# Patient Record
Sex: FEMALE | Race: WHITE | Employment: UNEMPLOYED | URBAN - METROPOLITAN AREA
[De-identification: names, ages, dates, MRNs, and addresses within clinical notes are randomized per-mention and may not be internally consistent; named-entity substitution may affect disease eponyms.]

---

## 2021-09-30 ENCOUNTER — HOSPITAL ENCOUNTER (EMERGENCY)
Age: 36
Discharge: HOME | End: 2021-09-30
Payer: COMMERCIAL

## 2021-09-30 VITALS
OXYGEN SATURATION: 98 % | HEIGHT: 65 IN | RESPIRATION RATE: 15 BRPM | TEMPERATURE: 98 F | DIASTOLIC BLOOD PRESSURE: 70 MMHG | WEIGHT: 118 LBS | BODY MASS INDEX: 19.66 KG/M2 | SYSTOLIC BLOOD PRESSURE: 116 MMHG | HEART RATE: 91 BPM

## 2021-09-30 DIAGNOSIS — B34.9 VIRAL SYNDROME: ICD-10-CM

## 2021-09-30 DIAGNOSIS — Z20.822 SUSPECTED COVID-19 VIRUS INFECTION: Primary | ICD-10-CM

## 2021-09-30 LAB
A/G RATIO: 1.1 (ref 1.1–2.2)
ALBUMIN SERPL-MCNC: 3.9 G/DL (ref 3.4–5)
ALP BLD-CCNC: 75 U/L (ref 40–129)
ALT SERPL-CCNC: 16 U/L (ref 10–40)
ANION GAP SERPL CALCULATED.3IONS-SCNC: 13 MMOL/L (ref 3–16)
AST SERPL-CCNC: 20 U/L (ref 15–37)
BACTERIA: ABNORMAL /HPF
BASOPHILS ABSOLUTE: 0 K/UL (ref 0–0.2)
BASOPHILS RELATIVE PERCENT: 0.6 %
BILIRUB SERPL-MCNC: 2 MG/DL (ref 0–1)
BILIRUBIN URINE: NEGATIVE
BLOOD, URINE: NEGATIVE
BUN BLDV-MCNC: 13 MG/DL (ref 7–20)
CALCIUM SERPL-MCNC: 10.5 MG/DL (ref 8.3–10.6)
CHLORIDE BLD-SCNC: 106 MMOL/L (ref 99–110)
CLARITY: ABNORMAL
CO2: 21 MMOL/L (ref 21–32)
COLOR: YELLOW
CREAT SERPL-MCNC: <0.5 MG/DL (ref 0.6–1.1)
EOSINOPHILS ABSOLUTE: 0 K/UL (ref 0–0.6)
EOSINOPHILS RELATIVE PERCENT: 0.5 %
EPITHELIAL CELLS, UA: 7 /HPF (ref 0–5)
GFR AFRICAN AMERICAN: >60
GFR NON-AFRICAN AMERICAN: >60
GLOBULIN: 3.7 G/DL
GLUCOSE BLD-MCNC: 110 MG/DL (ref 70–99)
GLUCOSE URINE: NEGATIVE MG/DL
HCG(URINE) PREGNANCY TEST: NEGATIVE
HCT VFR BLD CALC: 42.9 % (ref 36–48)
HEMOGLOBIN: 14.7 G/DL (ref 12–16)
HYALINE CASTS: 6 /LPF (ref 0–8)
KETONES, URINE: 40 MG/DL
LEUKOCYTE ESTERASE, URINE: ABNORMAL
LIPASE: 30 U/L (ref 13–60)
LYMPHOCYTES ABSOLUTE: 1.7 K/UL (ref 1–5.1)
LYMPHOCYTES RELATIVE PERCENT: 22.8 %
MCH RBC QN AUTO: 27.7 PG (ref 26–34)
MCHC RBC AUTO-ENTMCNC: 34.4 G/DL (ref 31–36)
MCV RBC AUTO: 80.7 FL (ref 80–100)
MICROSCOPIC EXAMINATION: YES
MONOCYTES ABSOLUTE: 0.7 K/UL (ref 0–1.3)
MONOCYTES RELATIVE PERCENT: 9.7 %
NEUTROPHILS ABSOLUTE: 5.1 K/UL (ref 1.7–7.7)
NEUTROPHILS RELATIVE PERCENT: 66.4 %
NITRITE, URINE: NEGATIVE
PDW BLD-RTO: 12.7 % (ref 12.4–15.4)
PH UA: 8 (ref 5–8)
PLATELET # BLD: 309 K/UL (ref 135–450)
PMV BLD AUTO: 8.8 FL (ref 5–10.5)
POTASSIUM REFLEX MAGNESIUM: 3.6 MMOL/L (ref 3.5–5.1)
PROTEIN UA: NEGATIVE MG/DL
RBC # BLD: 5.32 M/UL (ref 4–5.2)
RBC UA: 3 /HPF (ref 0–4)
SODIUM BLD-SCNC: 140 MMOL/L (ref 136–145)
SPECIFIC GRAVITY UA: 1.02 (ref 1–1.03)
TOTAL PROTEIN: 7.6 G/DL (ref 6.4–8.2)
URINE REFLEX TO CULTURE: ABNORMAL
URINE TYPE: ABNORMAL
UROBILINOGEN, URINE: 1 E.U./DL
WBC # BLD: 7.6 K/UL (ref 4–11)
WBC UA: 4 /HPF (ref 0–5)

## 2021-09-30 PROCEDURE — 99285 EMERGENCY DEPT VISIT HI MDM: CPT

## 2021-09-30 PROCEDURE — 85025 COMPLETE CBC W/AUTO DIFF WBC: CPT

## 2021-09-30 PROCEDURE — 80053 COMPREHEN METABOLIC PANEL: CPT

## 2021-09-30 PROCEDURE — 36415 COLL VENOUS BLD VENIPUNCTURE: CPT

## 2021-09-30 PROCEDURE — 96374 THER/PROPH/DIAG INJ IV PUSH: CPT

## 2021-09-30 PROCEDURE — 6370000000 HC RX 637 (ALT 250 FOR IP): Performed by: GENERAL ACUTE CARE HOSPITAL

## 2021-09-30 PROCEDURE — 81001 URINALYSIS AUTO W/SCOPE: CPT

## 2021-09-30 PROCEDURE — 83690 ASSAY OF LIPASE: CPT

## 2021-09-30 PROCEDURE — 84703 CHORIONIC GONADOTROPIN ASSAY: CPT

## 2021-09-30 PROCEDURE — 96375 TX/PRO/DX INJ NEW DRUG ADDON: CPT

## 2021-09-30 PROCEDURE — 2580000003 HC RX 258: Performed by: GENERAL ACUTE CARE HOSPITAL

## 2021-09-30 PROCEDURE — U0003 INFECTIOUS AGENT DETECTION BY NUCLEIC ACID (DNA OR RNA); SEVERE ACUTE RESPIRATORY SYNDROME CORONAVIRUS 2 (SARS-COV-2) (CORONAVIRUS DISEASE [COVID-19]), AMPLIFIED PROBE TECHNIQUE, MAKING USE OF HIGH THROUGHPUT TECHNOLOGIES AS DESCRIBED BY CMS-2020-01-R: HCPCS

## 2021-09-30 PROCEDURE — U0005 INFEC AGEN DETEC AMPLI PROBE: HCPCS

## 2021-09-30 PROCEDURE — 6360000002 HC RX W HCPCS: Performed by: GENERAL ACUTE CARE HOSPITAL

## 2021-09-30 RX ORDER — METOCLOPRAMIDE HYDROCHLORIDE 5 MG/ML
10 INJECTION INTRAMUSCULAR; INTRAVENOUS ONCE
Status: COMPLETED | OUTPATIENT
Start: 2021-09-30 | End: 2021-09-30

## 2021-09-30 RX ORDER — 0.9 % SODIUM CHLORIDE 0.9 %
1000 INTRAVENOUS SOLUTION INTRAVENOUS ONCE
Status: COMPLETED | OUTPATIENT
Start: 2021-09-30 | End: 2021-09-30

## 2021-09-30 RX ORDER — 0.9 % SODIUM CHLORIDE 0.9 %
2000 INTRAVENOUS SOLUTION INTRAVENOUS ONCE
Status: COMPLETED | OUTPATIENT
Start: 2021-09-30 | End: 2021-09-30

## 2021-09-30 RX ORDER — KETOROLAC TROMETHAMINE 30 MG/ML
30 INJECTION, SOLUTION INTRAMUSCULAR; INTRAVENOUS ONCE
Status: COMPLETED | OUTPATIENT
Start: 2021-09-30 | End: 2021-09-30

## 2021-09-30 RX ORDER — DIPHENHYDRAMINE HYDROCHLORIDE 50 MG/ML
25 INJECTION INTRAMUSCULAR; INTRAVENOUS ONCE
Status: COMPLETED | OUTPATIENT
Start: 2021-09-30 | End: 2021-09-30

## 2021-09-30 RX ORDER — PROMETHAZINE HYDROCHLORIDE 25 MG/1
25 TABLET ORAL EVERY 6 HOURS PRN
Qty: 20 TABLET | Refills: 0 | Status: SHIPPED | OUTPATIENT
Start: 2021-09-30 | End: 2021-10-07

## 2021-09-30 RX ORDER — ACETAMINOPHEN 500 MG
1000 TABLET ORAL ONCE
Status: COMPLETED | OUTPATIENT
Start: 2021-09-30 | End: 2021-09-30

## 2021-09-30 RX ADMIN — SODIUM CHLORIDE 2000 ML: 9 INJECTION, SOLUTION INTRAVENOUS at 13:09

## 2021-09-30 RX ADMIN — SODIUM CHLORIDE 1000 ML: 9 INJECTION, SOLUTION INTRAVENOUS at 16:19

## 2021-09-30 RX ADMIN — KETOROLAC TROMETHAMINE 30 MG: 30 INJECTION, SOLUTION INTRAMUSCULAR at 13:12

## 2021-09-30 RX ADMIN — DIPHENHYDRAMINE HYDROCHLORIDE 25 MG: 50 INJECTION, SOLUTION INTRAMUSCULAR; INTRAVENOUS at 13:12

## 2021-09-30 RX ADMIN — ACETAMINOPHEN 1000 MG: 500 TABLET ORAL at 16:16

## 2021-09-30 RX ADMIN — METOCLOPRAMIDE 10 MG: 5 INJECTION, SOLUTION INTRAMUSCULAR; INTRAVENOUS at 13:12

## 2021-09-30 ASSESSMENT — PAIN DESCRIPTION - LOCATION: LOCATION: HEAD;GENERALIZED

## 2021-09-30 ASSESSMENT — PAIN DESCRIPTION - PAIN TYPE: TYPE: ACUTE PAIN

## 2021-09-30 ASSESSMENT — PAIN SCALES - GENERAL
PAINLEVEL_OUTOF10: 6
PAINLEVEL_OUTOF10: 9

## 2021-09-30 NOTE — LETTER
[facility] Emergency Department  [address] [place] [address], [address]             October 14, 2021    Patient: Kalli Ruiz   YOB: 1985   Date of Visit: 9/30/2021       To Whom It May Concern:    Kalli Ruiz was seen and treated in our emergency department on 9/30/2021. She may return to work 10/3/21 without restrictions.       Sincerely,         Tatiana Grover NP/sp

## 2021-09-30 NOTE — CARE COORDINATION
Patient was not scheduled for New patient/ED Follow-Up appointment with [facility] today. Ms. Marine Morel stated she does not have a current PCP. Ms. Marine Morel declined to establish primary care with a [facility] PCP through me today. I had to rouse the patient several times by calling her name because she fell in and out of sleep throughout our in person encounter. Both the Physician Referral line number ([phone]) and the [address] [place] [address] Department phone number ([phone]) will be provided to her at discharge for any further questions/ concerns and to schedule a PCP at a later time if desired.

## 2021-09-30 NOTE — ED PROVIDER NOTES
[address] [facility]        Pt Name: Guera Tellez  MRN: [id number]  [place] 1985  Date of evaluation: 9/30/2021  Provider: CHARLEE Reyes CNP  PCP: No primary care provider on file. Note Started: 4:56 PM EDT       ZAKI. I have evaluated this patient. My supervising physician was available for consultation. CHIEF COMPLAINT       Chief Complaint   Patient presents with    Emesis     Pt to ER from home with HA, chills, body aches, emesis starting this morning. Pt reports exposed to boyfriend's kids who were dx with covid a couple weeks ago. Denies having covid vaccine.  Headache       HISTORY OF PRESENT ILLNESS   (Location, Timing/Onset, Context/Setting, Quality, Duration, Modifying Factors, Severity, Associated Signs and Symptoms)  Note limiting factors. Chief Complaint: Positive Covid exposure, generalized body aches, headache, vomiting    Guera Tellez is a 29 y.o. female who presents to the emergency department today requesting Covid test.  Patient states that she was exposed to Covid approximately 10 days ago. She states that over the past couple of days she has had generalized body aches, headache, and chills. Patient states that she vomited once this morning. There is been no recent travel. She is otherwise healthy without any chronic medical conditions. She has not been vaccinated against Covid. She does report an intermittent nonproductive cough. She denies having any chest pain or trouble breathing. She denies abdominal pain. She denies any urinary symptoms. She denies vaginal discharge or concerns for STDs. She denies concern for pregnancy. Patient has not taken anything for the symptoms. Nursing Notes were all reviewed and agreed with or any disagreements were addressed in the HPI.     REVIEW OF SYSTEMS    (2-9 systems for level 4, 10 or more for level 5)     Review of Systems   Constitutional: Positive for chills, fatigue and fever. HENT: Positive for congestion. Negative for sore throat and voice change. Eyes: Negative for visual disturbance. Respiratory: Positive for cough. Negative for chest tightness, shortness of breath and wheezing. Cardiovascular: Negative for chest pain and palpitations. Gastrointestinal: Positive for nausea and vomiting. Negative for abdominal pain, blood in stool and diarrhea. Endocrine: Negative for polydipsia and polyuria. Genitourinary: Negative for difficulty urinating and dysuria. Musculoskeletal: Positive for myalgias. Negative for back pain, neck pain and neck stiffness. Skin: Negative for rash and wound. Allergic/Immunologic: Negative for immunocompromised state. Neurological: Positive for headaches. Negative for dizziness, weakness and light-headedness. Hematological: Does not bruise/bleed easily. Psychiatric/Behavioral: Negative for suicidal ideas. Positives and Pertinent negatives as per HPI. Except as noted above in the ROS, all other systems were reviewed and negative. PAST MEDICAL HISTORY     Past Medical History:   Diagnosis Date    Ureteral reflux          SURGICAL HISTORY     Past Surgical History:   Procedure Laterality Date    BLADDER SURGERY      for reflux    URETER STENT PLACEMENT           CURRENTMEDICATIONS       Discharge Medication List as of 2021  5:16 PM      CONTINUE these medications which have NOT CHANGED    Details   PARAGARD INTRAUTERINE COPPER IU by Intrauterine route      acetaminophen (TYLENOL) 500 MG tablet Take 500 mg by mouth every 6 hours as needed for Pain (unsure of mg dose, took 2 at 1130)               ALLERGIES     Keflex [cephalexin]    FAMILYHISTORY     History reviewed. No pertinent family history.        SOCIAL HISTORY       Social History     Tobacco Use    Smoking status: Former Smoker     Packs/day: 0.50     Quit date: 2019     Years since quittin.0    Smokeless tobacco: Never Used   Substance Use Topics    Alcohol use: Yes     Comment: social    Drug use: No       SCREENINGS    Murtaugh Coma Scale  Eye Opening: Spontaneous  Best Verbal Response: Oriented  Best Motor Response: Obeys commands  Arias Coma Scale Score: 15        PHYSICAL EXAM    (up to 7 for level 4, 8 or more for level 5)     ED Triage Vitals [09/30/21 1228]   BP Temp Temp Source Pulse Resp SpO2 Height Weight   133/85 98 °F (36.7 °C) Oral 115 16 98 % 5' 5\" (1.651 m) 118 lb (53.5 kg)       Physical Exam  Vitals and nursing note reviewed. Constitutional:       General: She is not in acute distress. Appearance: Normal appearance. She is not ill-appearing, toxic-appearing or diaphoretic. HENT:      Head: Normocephalic and atraumatic. Right Ear: Tympanic membrane, ear canal and external ear normal.      Left Ear: Tympanic membrane, ear canal and external ear normal.      Nose: Nose normal.      Mouth/Throat:      Mouth: Mucous membranes are dry. Pharynx: Oropharynx is clear. No oropharyngeal exudate or posterior oropharyngeal erythema. Eyes:      General:         Right eye: No discharge. Left eye: No discharge. Extraocular Movements: Extraocular movements intact. Pupils: Pupils are equal, round, and reactive to light. Cardiovascular:      Rate and Rhythm: Normal rate and regular rhythm. Pulses: Normal pulses. Heart sounds: Normal heart sounds. Pulmonary:      Effort: Pulmonary effort is normal. No respiratory distress. Breath sounds: Normal breath sounds. Abdominal:      General: Bowel sounds are normal.      Palpations: Abdomen is soft. Tenderness: There is no abdominal tenderness. There is no right CVA tenderness, left CVA tenderness, guarding or rebound. Musculoskeletal:         General: Normal range of motion. Cervical back: Normal range of motion and neck supple. No rigidity or tenderness. Skin:     General: Skin is warm and dry. Capillary Refill: Capillary refill takes less than 2 seconds. Neurological:      General: No focal deficit present. Mental Status: She is alert and oriented to person, place, and time. Psychiatric:         Mood and Affect: Mood normal.         Behavior: Behavior normal.         Thought Content:  Thought content normal.         Judgment: Judgment normal.         DIAGNOSTIC RESULTS   LABS:    Labs Reviewed   URINE RT REFLEX TO CULTURE - Abnormal; Notable for the following components:       Result Value    Clarity, UA CLOUDY (*)     Ketones, Urine 40 (*)     Leukocyte Esterase, Urine TRACE (*)     All other components within normal limits    Narrative:     Performed at:  [facility] [address] [employer]   Phone [phone]   CBC WITH AUTO DIFFERENTIAL - Abnormal; Notable for the following components:    RBC 5.32 (*)     All other components within normal limits    Narrative:     Performed at:  [facility] [address] [employer]   Phone [phone]   COMPREHENSIVE METABOLIC PANEL W/ REFLEX TO MG FOR LOW K - Abnormal; Notable for the following components:    Glucose 110 (*)     CREATININE <0.5 (*)     Total Bilirubin 2.0 (*)     All other components within normal limits    Narrative:     Performed at:  [facility] [address] [facility] [employer]   Phone [phone]   MICROSCOPIC URINALYSIS - Abnormal; Notable for the following components:    Bacteria, UA 1+ (*)     Epithelial Cells, UA 7 (*)     All other components within normal limits    Narrative:     Performed at:  [facility] [address] [place]   Phone [phone]   PREGNANCY, URINE    Narrative:     Performed at:  [facility] [address] [facility] [employer] [place]   Phone [phone]   LIPASE    Narrative:     Performed at:  [facility]  [address], [address]   Phone [phone]    Narrative:     Performed at:  [facility]  [address]   Phone [phone]       When ordered only abnormal lab results are displayed. All other labs were within normal range or not returned as of this dictation. EKG: When ordered, EKG's are interpreted by the Emergency Department Physician in the absence of a cardiologist.  Please see their note for interpretation of EKG. RADIOLOGY:   Non-plain film images such as CT, Ultrasound and MRI are read by the radiologist. Plain radiographic images are visualized and preliminarily interpreted by the ED Provider with the below findings:        Interpretation per the Radiologist below, if available at the time of this note:    No orders to display     No results found. PROCEDURES   Unless otherwise noted below, none     Procedures    CRITICAL CARE TIME   N/A    CONSULTS:  None      EMERGENCY DEPARTMENT COURSE and DIFFERENTIAL DIAGNOSIS/MDM:   Vitals:    Vitals:    09/30/21 1615 09/30/21 1630 09/30/21 1645 09/30/21 1704   BP: 117/73 112/64 116/70    Pulse:   112 91   Resp:       Temp:       TempSrc:       SpO2: 97% 98%     Weight:       Height:           Patient was given the following medications:  Medications   0.9 % sodium chloride bolus (0 mLs IntraVENous Stopped 9/30/21 1611)   diphenhydrAMINE (BENADRYL) injection 25 mg (25 mg IntraVENous Given 9/30/21 1312)   metoclopramide (REGLAN) injection 10 mg (10 mg IntraVENous Given 9/30/21 1312)   ketorolac (TORADOL) injection 30 mg (30 mg IntraVENous Given 9/30/21 1312)   acetaminophen (TYLENOL) tablet 1,000 mg (1,000 mg Oral Given 9/30/21 1616)   0.9 % sodium chloride bolus (0 mLs IntraVENous Stopped 9/30/21 1710)         Previous records reviewed in order to gain further information regarding patient's PMH as well as her HPI. Nursing notes reviewed. This is a 43-year-old otherwise healthy female who presents to the emergency department today reporting URI symptoms, generalized body aches for the past 2 days. She does report possible Covid exposure. Physical exam complete. She arrives nontoxic, afebrile, normotensive. Patient is medicated with IV normal saline 2 L, IV Benadryl, IV Reglan, IV Toradol, and Tylenol 1 g. Her laboratory studies have been unremarkable. Patient reassessed. She does report significant relief of symptoms after intervention. She is requesting to be discharged home. At this time there is no evidence of any life-threatening or emergent condition requiring immediate intervention. Low suspicion for sepsis, pneumonia, or other acute pathology. She will be discharged with emphasis on close outpatient follow-up. Prescription for Phenergan. She is encouraged to increase her fluid intake. She agrees to follow-up with her PCP as directed. She agrees return to nearest ED for high fever, incessant vomiting, severe pain, any other worsening symptoms. She is discharged home in stable condition. FINAL IMPRESSION      1. Suspected COVID-19 virus infection    2. Viral syndrome          DISPOSITION/PLAN   DISPOSITION Decision To Discharge 09/30/2021 04:56:47 PM      PATIENT REFERRED TO:  [facility]  [phone]  In 2 days  to establish primary care      DISCHARGE MEDICATIONS:  Discharge Medication List as of 9/30/2021  5:16 PM      START taking these medications    Details   promethazine (PHENERGAN) 25 MG tablet Take 1 tablet by mouth every 6 hours as needed for Nausea WARNING:  May cause drowsiness. May impair ability to operate vehicles or machinery.   Do not use in combination with alcohol., Disp-20 tablet, R-0Print             DISCONTINUED MEDICATIONS:  Discharge Medication List as of 9/30/2021  5:16 PM                 (Please note that portions of this note were completed with a voice recognition program.  Efforts were made to edit the dictations but occasionally words are mis-transcribed.)    Loralie Severs, APRN - CNP (electronically signed)            Loralie Severs, APRN - CNP  10/01/21 [id number]

## 2021-09-30 NOTE — ED NOTES
Patient discharged to home. Patient verbalized understanding of discharge instructions, patient alert and oriented x4 at this time. Patient leaving in stable condition.      Josephine Da Silva RN  09/30/21 2642

## 2021-10-01 LAB — SARS-COV-2: NOT DETECTED

## 2021-10-01 ASSESSMENT — ENCOUNTER SYMPTOMS
BLOOD IN STOOL: 0
SORE THROAT: 0
ABDOMINAL PAIN: 0
COUGH: 1
CHEST TIGHTNESS: 0
VOMITING: 1
VOICE CHANGE: 0
NAUSEA: 1
WHEEZING: 0
SHORTNESS OF BREATH: 0
DIARRHEA: 0
BACK PAIN: 0

## 2021-10-06 LAB
HEPATITIS B SURFACE ANTIGEN INTERPRETATION: NORMAL
HEPATITIS C ANTIBODY INTERPRETATION: REACTIVE
HIV AG/AB: NORMAL
HIV ANTIGEN: NORMAL
HIV-1 ANTIBODY: NORMAL
HIV-2 AB: NORMAL
RPR CONFIRMATORY: NORMAL
TOTAL SYPHILLIS IGG/IGM: REACTIVE
TSH SERPL DL<=0.05 MIU/L-ACNC: <0.01 UIU/ML (ref 0.27–4.2)

## 2021-10-10 LAB — TREPONEMA PALLIDUM ANTIBODIES: REACTIVE

## 2021-10-19 ENCOUNTER — HOSPITAL ENCOUNTER (OUTPATIENT)
Age: 36
Discharge: HOME | End: 2021-10-19
Payer: COMMERCIAL

## 2021-10-19 PROCEDURE — 87522 HEPATITIS C REVRS TRNSCRPJ: CPT

## 2021-10-22 LAB
HCV QNT BY NAAT IU/ML: NOT DETECTED IU/ML
HCV QNT BY NAAT LOG IU/ML: NOT DETECTED LOG IU/ML
INTERPRETATION: NOT DETECTED

## 2021-10-27 ENCOUNTER — VIRTUAL VISIT (OUTPATIENT)
Dept: FAMILY MEDICINE CLINIC | Age: 36
End: 2021-10-27
Payer: COMMERCIAL

## 2021-10-27 ENCOUNTER — NURSE TRIAGE (OUTPATIENT)
Dept: OTHER | Facility: CLINIC | Age: 36
End: 2021-10-27

## 2021-10-27 DIAGNOSIS — G44.009 MIGRAINE-CLUSTER HEADACHE SYNDROME: Primary | ICD-10-CM

## 2021-10-27 PROCEDURE — 99204 OFFICE O/P NEW MOD 45 MIN: CPT | Performed by: FAMILY MEDICINE

## 2021-10-27 PROCEDURE — G8420 CALC BMI NORM PARAMETERS: HCPCS | Performed by: FAMILY MEDICINE

## 2021-10-27 PROCEDURE — G8427 DOCREV CUR MEDS BY ELIG CLIN: HCPCS | Performed by: FAMILY MEDICINE

## 2021-10-27 PROCEDURE — G8484 FLU IMMUNIZE NO ADMIN: HCPCS | Performed by: FAMILY MEDICINE

## 2021-10-27 PROCEDURE — 1036F TOBACCO NON-USER: CPT | Performed by: FAMILY MEDICINE

## 2021-10-27 RX ORDER — SUMATRIPTAN 25 MG/1
25 TABLET, FILM COATED ORAL
Qty: 20 TABLET | Refills: 2 | Status: SHIPPED | OUTPATIENT
Start: 2021-10-27 | End: 2022-05-31

## 2021-10-27 RX ORDER — ONDANSETRON 4 MG/1
4 TABLET, ORALLY DISINTEGRATING ORAL EVERY 8 HOURS PRN
Qty: 30 TABLET | Refills: 0 | Status: SHIPPED | OUTPATIENT
Start: 2021-10-27 | End: 2022-01-13

## 2021-10-27 SDOH — ECONOMIC STABILITY: FOOD INSECURITY: WITHIN THE PAST 12 MONTHS, THE FOOD YOU BOUGHT JUST DIDN'T LAST AND YOU DIDN'T HAVE MONEY TO GET MORE.: NEVER TRUE

## 2021-10-27 SDOH — ECONOMIC STABILITY: FOOD INSECURITY: WITHIN THE PAST 12 MONTHS, YOU WORRIED THAT YOUR FOOD WOULD RUN OUT BEFORE YOU GOT MONEY TO BUY MORE.: NEVER TRUE

## 2021-10-27 ASSESSMENT — SOCIAL DETERMINANTS OF HEALTH (SDOH): HOW HARD IS IT FOR YOU TO PAY FOR THE VERY BASICS LIKE FOOD, HOUSING, MEDICAL CARE, AND HEATING?: NOT HARD AT ALL

## 2021-10-27 ASSESSMENT — PATIENT HEALTH QUESTIONNAIRE - PHQ9
2. FEELING DOWN, DEPRESSED OR HOPELESS: 0
SUM OF ALL RESPONSES TO PHQ9 QUESTIONS 1 & 2: 0
SUM OF ALL RESPONSES TO PHQ QUESTIONS 1-9: 0
SUM OF ALL RESPONSES TO PHQ QUESTIONS 1-9: 0
1. LITTLE INTEREST OR PLEASURE IN DOING THINGS: 0
SUM OF ALL RESPONSES TO PHQ QUESTIONS 1-9: 0

## 2021-10-27 ASSESSMENT — ENCOUNTER SYMPTOMS
CONSTIPATION: 0
CHEST TIGHTNESS: 0
ABDOMINAL PAIN: 0
DIARRHEA: 0
SHORTNESS OF BREATH: 0
SORE THROAT: 0
RHINORRHEA: 0

## 2021-10-27 NOTE — TELEPHONE ENCOUNTER
Reason for Disposition   SEVERE headache (e.g., excruciating) and has had severe headaches before    Answer Assessment - Initial Assessment Questions  1. LOCATION: \"Where does it hurt? \"       Behind left eye    2. ONSET: \"When did the headache start? \" (Minutes, hours or days)       This one started 2 days ago - she woke up at 0630 and knew it was a migraine    3. PATTERN: \"Does the pain come and go, or has it been constant since it started? \"      When she has a migraine it is pretty constant    4. SEVERITY: \"How bad is the pain? \" and \"What does it keep you from doing? \"  (e.g., Scale 1-10; mild, moderate, or severe)    - MILD (1-3): doesn't interfere with normal activities     - MODERATE (4-7): interferes with normal activities or awakens from sleep     - SEVERE (8-10): excruciating pain, unable to do any normal activities         Rates 8/10    5. RECURRENT SYMPTOM: \"Have you ever had headaches before? \" If so, ask: \"When was the last time? \" and \"What happened that time? \"       Yes    6. CAUSE: \"What do you think is causing the headache? \"      Migraine    7. MIGRAINE: \"Have you been diagnosed with migraine headaches? \" If so, ask: \"Is this headache similar? \"       Yes, diagnosed with cluster migraines     8. HEAD INJURY: \"Has there been any recent injury to the head? \"       Denies    9. OTHER SYMPTOMS: \"Do you have any other symptoms? \" (fever, stiff neck, eye pain, sore throat, cold symptoms)      Excedrin works only temporarily. Pt c/o nausea, no other symptoms       10. PREGNANCY: \"Is there any chance you are pregnant? \" \"When was your last menstrual period? \"        no    Protocols used: HEADACHE-ADULT-[place]    Patient called [facility] with red flag complaint to establish care with Michael MARX.     Brief description of triage: see above    Triage indicates for patient to callback by pcp within 1 hour - since pt unestablished , per workflow, this RN recommended going to ER or UC for pain control/further evaluation. Care advice provided, patient verbalizes understanding; denies any other questions or concerns; instructed to call back for any new or worsening symptoms. Writer provided warm transfer to [facility] for appointment scheduling to establish care. Attention Provider: Thank you for allowing me to participate in the care of your patient. The patient was connected to triage in response to information provided to the ECC. Please do not respond through this encounter as the response is not directed to a shared pool.

## 2021-10-27 NOTE — PROGRESS NOTES
Subjective:   Patient ID: Blayne Cruz is a 29 y.o. female here today to establish care. HPI by clinical support staff:   Chief Complaint   Patient presents with    New Patient    Migraine     H/O of migraines, current episode has lasted 3 days. Preliminary data above this line collected by clinical support staff.    ______________________________________________________________________  HPI by Provider:   HPI   Patient presents via virtual visit to establish care. History reviewed and updated with patient today. She reports she has been having throbbing headache over the last 3 days. States that she has a history of migraines and cluster headaches that had initially resolved however started having episodes last week. Has been more stressed than usual.  Was previously on Imitrex which did help her symptoms. Patient states that she is very nauseated at this time and unable to talk further. Denies any neurological symptoms. Data above this line collected by Provider. Patient's medications, allergies, past medical, surgical, social and family histories were reviewed and updated as appropriate. Patient Care Team:  Lurdes Obando MD as PCP - General (Family Medicine)  Allergies   Allergen Reactions    Keflex [Cephalexin] Rash     No current outpatient medications on file prior to visit. No current facility-administered medications on file prior to visit. Review of Systems   Constitutional: Negative for activity change, appetite change, fatigue and fever. HENT: Negative for congestion, rhinorrhea and sore throat. Respiratory: Negative for chest tightness and shortness of breath. Cardiovascular: Negative for chest pain, palpitations and leg swelling. Gastrointestinal: Negative for abdominal pain, constipation and diarrhea. Genitourinary: Negative for dysuria and frequency. Musculoskeletal: Negative for arthralgias.    Neurological: Negative for dizziness, weakness and headaches. Psychiatric/Behavioral: Negative for hallucinations. All other systems reviewed and are negative. ROS above this line reviewed by Provider. Objective: There were no vitals taken for this visit. Physical Exam  Nursing note reviewed. Constitutional:       General: She is not in acute distress. Appearance: Normal appearance. She is normal weight. She is not ill-appearing, toxic-appearing or diaphoretic. Comments: Well groomed   HENT:      Head: Normocephalic and atraumatic. Pulmonary:      Effort: Pulmonary effort is normal.      Comments: Speaking in full sentences  Musculoskeletal:      Cervical back: Normal range of motion. Neurological:      Mental Status: She is alert. Psychiatric:         Mood and Affect: Mood normal.         Behavior: Behavior normal.         Thought Content: Thought content normal.       Lab Results   Component Value Date    WBC 7.6 09/30/2021    HGB 14.7 09/30/2021    HCT 42.9 09/30/2021    MCV 80.7 09/30/2021     09/30/2021     Lab Results   Component Value Date     09/30/2021    K 3.6 09/30/2021    BUN 13 09/30/2021    CREATININE <0.5 09/30/2021    GLUCOSE 110 09/30/2021    CALCIUM 10.5 09/30/2021    BILITOT 2.0 09/30/2021    ALKPHOS 75 09/30/2021    AST 20 09/30/2021    ALT 16 09/30/2021    GFRAA >60 09/30/2021     Lab Results   Component Value Date    TSH <0.01 10/05/2021     No results found for: LABA1C  No results found for: EAG  No results found for: CHOL, TRIG, HDL, LDLCHOLESTEROL, CHOLHDLRATIO  Lab Results   Component Value Date    LABMICR YES 09/30/2021     No results found for: VITD25  Assessment and Plan:   1. Migraine-cluster headache syndrome  Known history of migraines and cluster headaches prescription sent in at this time as patient is not able to talk further given her nausea.   Patient advised to visit the emergency room if symptoms do not improve or if she develops any neurological symptoms patient verbalized understanding plans to follow-up in office in 1 week. - ondansetron (ZOFRAN-ODT) 4 MG disintegrating tablet; Take 1 tablet by mouth every 8 hours as needed for Nausea or Vomiting  Dispense: 30 tablet; Refill: 0  - SUMAtriptan (IMITREX) 25 MG tablet; Take 1 tablet by mouth once as needed for Migraine May repeat in 2 hours if not improving- max daily 200mg. Dispense: 20 tablet; Refill: 2       Tequila Chi  is a 29 y.o. female being evaluated by a Virtual Visit (video visit) encounter to address concerns as mentioned above. A caregiver was present when appropriate. Due to this being a TeleHealth encounter (During [address] emergency), evaluation of the following organ systems was limited: Vitals/Constitutional/EENT/Resp/CV/GI//MS/Neuro/Skin/Heme-Lymph-Imm. Pursuant to the emergency declaration under the [address] [place] [address] and the [employer] Act, this Virtual Visit was conducted with patient's (and/or legal guardian's) consent, to reduce the patient's risk of exposure to COVID-19 and provide necessary medical care. The patient (and/or legal guardian) has also been advised to contact this office for worsening conditions or problems, and seek emergency medical treatment and/or call 911 if deemed necessary. Patient identification was verified at the start of the visit: Yes    Total time spent for this encounter: Not billed by time    Services were provided through a video synchronous discussion virtually to substitute for in-person clinic visit. Patient  located at their individual homes. This chart note was prepared using a voice recognition dictation program. This note was reviewed for accuracy; however, addition, deletion and sound-alike word errors may occur. If there are any questions regarding this chart note, please contact the originating provider.     Electronically signed by Carisa Pace MD  10/27/2021   1:09 PM    Return in about 1 week (around 11/3/2021) for Headache.

## 2021-11-16 ENCOUNTER — HOSPITAL ENCOUNTER (OUTPATIENT)
Dept: CT IMAGING | Age: 36
Discharge: HOME | End: 2021-11-16
Payer: COMMERCIAL

## 2021-11-16 DIAGNOSIS — R63.4 ABNORMAL WEIGHT LOSS: ICD-10-CM

## 2021-11-16 PROCEDURE — 6360000004 HC RX CONTRAST MEDICATION: Performed by: INTERNAL MEDICINE

## 2021-11-16 PROCEDURE — 74177 CT ABD & PELVIS W/CONTRAST: CPT

## 2021-11-16 RX ADMIN — IOHEXOL 50 ML: 240 INJECTION, SOLUTION INTRATHECAL; INTRAVASCULAR; INTRAVENOUS; ORAL at 07:45

## 2021-11-16 RX ADMIN — IOPAMIDOL 75 ML: 755 INJECTION, SOLUTION INTRAVENOUS at 07:45

## 2022-01-05 ENCOUNTER — HOSPITAL ENCOUNTER (OUTPATIENT)
Age: 37
Discharge: HOME | End: 2022-01-05
Payer: COMMERCIAL

## 2022-01-05 LAB
T3 FREE: 10.8 PG/ML (ref 2.3–4.2)
T4 FREE: 2.8 NG/DL (ref 0.9–1.8)
TSH SERPL DL<=0.05 MIU/L-ACNC: <0.01 UIU/ML (ref 0.27–4.2)

## 2022-01-05 PROCEDURE — 84481 FREE ASSAY (FT-3): CPT

## 2022-01-05 PROCEDURE — 84439 ASSAY OF FREE THYROXINE: CPT

## 2022-01-05 PROCEDURE — 84443 ASSAY THYROID STIM HORMONE: CPT

## 2022-01-05 PROCEDURE — 36415 COLL VENOUS BLD VENIPUNCTURE: CPT

## 2022-01-07 ENCOUNTER — OFFICE VISIT (OUTPATIENT)
Dept: FAMILY MEDICINE CLINIC | Age: 37
End: 2022-01-07
Payer: COMMERCIAL

## 2022-01-07 VITALS
BODY MASS INDEX: 19.66 KG/M2 | SYSTOLIC BLOOD PRESSURE: 115 MMHG | WEIGHT: 118 LBS | TEMPERATURE: 98.2 F | HEART RATE: 87 BPM | HEIGHT: 65 IN | DIASTOLIC BLOOD PRESSURE: 75 MMHG | OXYGEN SATURATION: 97 %

## 2022-01-07 DIAGNOSIS — F43.22 ADJUSTMENT DISORDER WITH ANXIOUS MOOD: Primary | ICD-10-CM

## 2022-01-07 DIAGNOSIS — R63.4 WEIGHT LOSS: ICD-10-CM

## 2022-01-07 DIAGNOSIS — Z86.19 HISTORY OF HEPATITIS C: ICD-10-CM

## 2022-01-07 DIAGNOSIS — R13.10 DYSPHAGIA, UNSPECIFIED TYPE: ICD-10-CM

## 2022-01-07 PROCEDURE — G8427 DOCREV CUR MEDS BY ELIG CLIN: HCPCS | Performed by: NURSE PRACTITIONER

## 2022-01-07 PROCEDURE — G8420 CALC BMI NORM PARAMETERS: HCPCS | Performed by: NURSE PRACTITIONER

## 2022-01-07 PROCEDURE — 1036F TOBACCO NON-USER: CPT | Performed by: NURSE PRACTITIONER

## 2022-01-07 PROCEDURE — 99214 OFFICE O/P EST MOD 30 MIN: CPT | Performed by: NURSE PRACTITIONER

## 2022-01-07 PROCEDURE — G8484 FLU IMMUNIZE NO ADMIN: HCPCS | Performed by: NURSE PRACTITIONER

## 2022-01-07 RX ORDER — PANTOPRAZOLE SODIUM 40 MG/1
TABLET, DELAYED RELEASE ORAL
COMMUNITY
Start: 2022-01-03 | End: 2022-05-25

## 2022-01-07 RX ORDER — HYDROXYZINE PAMOATE 25 MG/1
25 CAPSULE ORAL 3 TIMES DAILY PRN
Qty: 90 CAPSULE | Refills: 0 | Status: SHIPPED | OUTPATIENT
Start: 2022-01-07 | End: 2022-02-01 | Stop reason: SINTOL

## 2022-01-07 RX ORDER — ESCITALOPRAM OXALATE 10 MG/1
TABLET ORAL
Qty: 30 TABLET | Refills: 3 | Status: SHIPPED | OUTPATIENT
Start: 2022-01-07 | End: 2022-03-17

## 2022-01-07 NOTE — PROGRESS NOTES
Giulia Wong (:  1985) is a 29 y.o. female,Established patient, here for evaluation of the following chief complaint(s):  Weight Loss and Anxiety         ASSESSMENT/PLAN:  1. Adjustment disorder with anxious mood  Stable;  Begin Lexapro and Vistaril. Risks and benefits discussed. Genesight completed. -     escitalopram (LEXAPRO) 10 MG tablet; Take 1/2 tablet daily x 1 week and then increase to 1 full tablet daily, Disp-30 tablet, R-3Normal  -     hydrOXYzine (VISTARIL) 25 MG capsule; Take 1 capsule by mouth 3 times daily as needed for Anxiety, Disp-90 capsule, R-0Normal  2. Weight loss  Stable;  Continue w/u from GI and allergist.  3. Dysphagia, unspecified type  Stable;  Continue recommendations from GI.  4. History of hepatitis C  Stable;  Treatment complete. Return in about 2 weeks (around 2022).          Subjective   SUBJECTIVE/OBJECTIVE:  HPI   Weight loss  3-4 months ago  Got sick- not COVID; lasted for a while  Started losing weight- does not have a scale  Diet has not changed much  Moderate exercise  In 3 months lost 25 lbs  Saw gynecology  Is gaining/ maintaining- got down to 110 lbs    Adjustment disorder with anxious mood  Grieves kids- does not have full custody, are not with her all the time; 5 years ago sarted moving them with their dad, 1 year ago got full custody  Is not working right now- is ok  Has been feeling \"body anxiety\"; will get short of breath or hold breath; feet will tense  Is on edge all the time  Feels like she is going to cry  Has up and downs sometimes- depends on what is going on  Does not go days without sleeping  Has been in a stable place  Wellbutrin- makes her eat and sweat  Prozac, Seroquel and Buspar- did not feel better  Has never taken anything for a long period of time  Celexa or Zoloft as a kid  Denies SI    Dysphagia  Thought had eosinophilic esophagitis- saw Estrellita Turcios says does not have  Wants to look thyroid  Dr. Darren Enriquez C  Is cured- completed treatment    Review of Systems       Objective   Physical Exam  Vitals reviewed. Constitutional:       Appearance: Normal appearance. HENT:      Head: Normocephalic. Right Ear: External ear normal.      Left Ear: External ear normal.   Cardiovascular:      Rate and Rhythm: Normal rate and regular rhythm. Pulses: Normal pulses. Heart sounds: Normal heart sounds, S1 normal and S2 normal.   Pulmonary:      Effort: Pulmonary effort is normal.      Breath sounds: Normal breath sounds and air entry. Musculoskeletal:      Right lower leg: No edema. Left lower leg: No edema. Neurological:      Mental Status: She is alert. Psychiatric:         Mood and Affect: Mood is anxious. Affect is tearful. Thought Content: Thought content does not include suicidal ideation. Thought content does not include suicidal plan. An electronic signature was used to authenticate this note.     --CHARLEE Gaviria - CNP

## 2022-01-08 PROBLEM — R63.4 WEIGHT LOSS: Status: ACTIVE | Noted: 2022-01-08

## 2022-01-08 PROBLEM — R13.10 DYSPHAGIA: Status: ACTIVE | Noted: 2022-01-08

## 2022-01-08 PROBLEM — F43.22 ADJUSTMENT DISORDER WITH ANXIOUS MOOD: Status: ACTIVE | Noted: 2022-01-08

## 2022-01-08 PROBLEM — Z86.19 HISTORY OF HEPATITIS C: Status: ACTIVE | Noted: 2022-01-08

## 2022-01-13 ENCOUNTER — OFFICE VISIT (OUTPATIENT)
Dept: FAMILY MEDICINE CLINIC | Age: 37
End: 2022-01-13
Payer: COMMERCIAL

## 2022-01-13 ENCOUNTER — HOSPITAL ENCOUNTER (OUTPATIENT)
Age: 37
Discharge: HOME | End: 2022-01-13
Payer: COMMERCIAL

## 2022-01-13 VITALS
HEART RATE: 85 BPM | HEIGHT: 66 IN | SYSTOLIC BLOOD PRESSURE: 110 MMHG | TEMPERATURE: 97.2 F | OXYGEN SATURATION: 98 % | DIASTOLIC BLOOD PRESSURE: 58 MMHG | BODY MASS INDEX: 18.42 KG/M2 | WEIGHT: 114.6 LBS | RESPIRATION RATE: 16 BRPM

## 2022-01-13 DIAGNOSIS — R79.89 LOW TSH LEVEL: ICD-10-CM

## 2022-01-13 DIAGNOSIS — R79.89 LOW TSH LEVEL: Primary | ICD-10-CM

## 2022-01-13 LAB — THYROID PEROXIDASE (TPO) ABS: 314 IU/ML

## 2022-01-13 PROCEDURE — G8484 FLU IMMUNIZE NO ADMIN: HCPCS | Performed by: NURSE PRACTITIONER

## 2022-01-13 PROCEDURE — 84445 ASSAY OF TSI GLOBULIN: CPT

## 2022-01-13 PROCEDURE — 83520 IMMUNOASSAY QUANT NOS NONAB: CPT

## 2022-01-13 PROCEDURE — 86376 MICROSOMAL ANTIBODY EACH: CPT

## 2022-01-13 PROCEDURE — G8420 CALC BMI NORM PARAMETERS: HCPCS | Performed by: NURSE PRACTITIONER

## 2022-01-13 PROCEDURE — 99214 OFFICE O/P EST MOD 30 MIN: CPT | Performed by: NURSE PRACTITIONER

## 2022-01-13 PROCEDURE — G8427 DOCREV CUR MEDS BY ELIG CLIN: HCPCS | Performed by: NURSE PRACTITIONER

## 2022-01-13 PROCEDURE — 36415 COLL VENOUS BLD VENIPUNCTURE: CPT

## 2022-01-13 PROCEDURE — 4004F PT TOBACCO SCREEN RCVD TLK: CPT | Performed by: NURSE PRACTITIONER

## 2022-01-13 NOTE — PROGRESS NOTES
Nini Noel (:  1985) is a 29 y.o. female,Established patient, here for evaluation of the following chief complaint(s):  Established New Doctor         ASSESSMENT/PLAN:  1. Low TSH level  Stable; Not progressing; Low TSH on lab work. Ordered further testing and thyroid uptake scan. Referral to endocrinology.  -     NM THYROID UPTAKE AND SCAN; Future  -     THYROID PEROXIDASE ANTIBODY; Future  -     THYROTROPIN RECEPTOR ANTIBODY; Future  -     THYROID STIMULATING IMMUNOGLOBULIN; Future  -     Jenifer Friend MD, Endocrinology, [facility]      No follow-ups on file. Subjective   SUBJECTIVE/OBJECTIVE:  HPI  Did not start Lexapro- anxiety  Weight loss  No diarrhea  Sometimes heat intolerance; hands clammy  Gets sweaty  No thinning of hair  No vision changes  + tachycardia  Low TSH- allergist completed  Will do further testing    Review of Systems       Objective   Physical Exam  Vitals reviewed. Constitutional:       Appearance: Normal appearance. HENT:      Head: Normocephalic. Right Ear: External ear normal.      Left Ear: External ear normal.   Pulmonary:      Effort: No respiratory distress. Neurological:      Mental Status: She is alert. Psychiatric:         Mood and Affect: Mood normal.       An electronic signature was used to authenticate this note.     --CHARLEE Martinez - CNP

## 2022-01-18 LAB — THYROID STIMULATING IMMUNOGLOBULIN: 31 IU/L

## 2022-01-20 LAB — TSH RECEPTOR AB: 25.29 IU/L

## 2022-01-27 ENCOUNTER — HOSPITAL ENCOUNTER (OUTPATIENT)
Dept: NUCLEAR MEDICINE | Age: 37
Discharge: HOME | End: 2022-01-27
Payer: COMMERCIAL

## 2022-01-27 DIAGNOSIS — R79.89 LOW TSH LEVEL: ICD-10-CM

## 2022-01-27 PROCEDURE — A9516 IODINE I-123 SOD IODIDE MIC: HCPCS | Performed by: NURSE PRACTITIONER

## 2022-01-27 PROCEDURE — 3430000000 HC RX DIAGNOSTIC RADIOPHARMACEUTICAL: Performed by: NURSE PRACTITIONER

## 2022-01-27 PROCEDURE — 78014 THYROID IMAGING W/BLOOD FLOW: CPT

## 2022-01-27 RX ORDER — SODIUM IODIDE I 123 100 UCI/1
315 CAPSULE, GELATIN COATED ORAL ONCE
Status: COMPLETED | OUTPATIENT
Start: 2022-01-27 | End: 2022-01-27

## 2022-01-27 RX ADMIN — SODIUM IODIDE I 123 315 MICRO CURIE: 100 CAPSULE, GELATIN COATED ORAL at 07:47

## 2022-01-28 ENCOUNTER — HOSPITAL ENCOUNTER (OUTPATIENT)
Dept: NUCLEAR MEDICINE | Age: 37
Discharge: HOME | End: 2022-01-28

## 2022-02-01 ENCOUNTER — OFFICE VISIT (OUTPATIENT)
Dept: FAMILY MEDICINE CLINIC | Age: 37
End: 2022-02-01
Payer: COMMERCIAL

## 2022-02-01 VITALS
OXYGEN SATURATION: 98 % | BODY MASS INDEX: 20.71 KG/M2 | SYSTOLIC BLOOD PRESSURE: 110 MMHG | RESPIRATION RATE: 16 BRPM | DIASTOLIC BLOOD PRESSURE: 70 MMHG | HEART RATE: 71 BPM | WEIGHT: 126.4 LBS | TEMPERATURE: 96.8 F

## 2022-02-01 DIAGNOSIS — R07.81 RIB PAIN: ICD-10-CM

## 2022-02-01 DIAGNOSIS — R79.89 LOW TSH LEVEL: Primary | ICD-10-CM

## 2022-02-01 DIAGNOSIS — F43.22 ADJUSTMENT DISORDER WITH ANXIOUS MOOD: ICD-10-CM

## 2022-02-01 PROCEDURE — G8484 FLU IMMUNIZE NO ADMIN: HCPCS | Performed by: NURSE PRACTITIONER

## 2022-02-01 PROCEDURE — G8427 DOCREV CUR MEDS BY ELIG CLIN: HCPCS | Performed by: NURSE PRACTITIONER

## 2022-02-01 PROCEDURE — G8420 CALC BMI NORM PARAMETERS: HCPCS | Performed by: NURSE PRACTITIONER

## 2022-02-01 PROCEDURE — 4004F PT TOBACCO SCREEN RCVD TLK: CPT | Performed by: NURSE PRACTITIONER

## 2022-02-01 PROCEDURE — 99214 OFFICE O/P EST MOD 30 MIN: CPT | Performed by: NURSE PRACTITIONER

## 2022-02-01 RX ORDER — PROPRANOLOL HYDROCHLORIDE 10 MG/1
10 TABLET ORAL 2 TIMES DAILY
Qty: 60 TABLET | Refills: 0 | Status: SHIPPED | OUTPATIENT
Start: 2022-02-01 | End: 2022-02-28

## 2022-02-01 ASSESSMENT — PATIENT HEALTH QUESTIONNAIRE - PHQ9
7. TROUBLE CONCENTRATING ON THINGS, SUCH AS READING THE NEWSPAPER OR WATCHING TELEVISION: 0
4. FEELING TIRED OR HAVING LITTLE ENERGY: 0
10. IF YOU CHECKED OFF ANY PROBLEMS, HOW DIFFICULT HAVE THESE PROBLEMS MADE IT FOR YOU TO DO YOUR WORK, TAKE CARE OF THINGS AT HOME, OR GET ALONG WITH OTHER PEOPLE: 0
2. FEELING DOWN, DEPRESSED OR HOPELESS: 0
6. FEELING BAD ABOUT YOURSELF - OR THAT YOU ARE A FAILURE OR HAVE LET YOURSELF OR YOUR FAMILY DOWN: 0
SUM OF ALL RESPONSES TO PHQ QUESTIONS 1-9: 4
8. MOVING OR SPEAKING SO SLOWLY THAT OTHER PEOPLE COULD HAVE NOTICED. OR THE OPPOSITE, BEING SO FIGETY OR RESTLESS THAT YOU HAVE BEEN MOVING AROUND A LOT MORE THAN USUAL: 0
SUM OF ALL RESPONSES TO PHQ9 QUESTIONS 1 & 2: 0
9. THOUGHTS THAT YOU WOULD BE BETTER OFF DEAD, OR OF HURTING YOURSELF: 0
SUM OF ALL RESPONSES TO PHQ QUESTIONS 1-9: 4
SUM OF ALL RESPONSES TO PHQ QUESTIONS 1-9: 4
1. LITTLE INTEREST OR PLEASURE IN DOING THINGS: 0
3. TROUBLE FALLING OR STAYING ASLEEP: 3
SUM OF ALL RESPONSES TO PHQ QUESTIONS 1-9: 4
5. POOR APPETITE OR OVEREATING: 1

## 2022-02-01 ASSESSMENT — ANXIETY QUESTIONNAIRES
GAD7 TOTAL SCORE: 0
1. FEELING NERVOUS, ANXIOUS, OR ON EDGE: 0
2. NOT BEING ABLE TO STOP OR CONTROL WORRYING: 0
4. TROUBLE RELAXING: 0
7. FEELING AFRAID AS IF SOMETHING AWFUL MIGHT HAPPEN: 0
3. WORRYING TOO MUCH ABOUT DIFFERENT THINGS: 0
5. BEING SO RESTLESS THAT IT IS HARD TO SIT STILL: 0
6. BECOMING EASILY ANNOYED OR IRRITABLE: 0

## 2022-02-01 NOTE — PROGRESS NOTES
Yosef Florentino (:  1985) is a 29 y.o. female,Established patient, here for evaluation of the following chief complaint(s):  Results (lab results )         ASSESSMENT/PLAN:  1. Low TSH level  Stable;  Discussed results. Will recheck in 1 month. Patient has an upcoming appointment with endocrinology. Patient unsure if she is finished having kids.  -     TSH without Reflex; Future  -     T4, FREE; Future  -     T3, FREE; Future  -     THYROID PEROXIDASE ANTIBODY; Future  -     THYROID STIMULATING IMMUNOGLOBULIN; Future  -     ANTI-THYROGLOBULIN ANTIBODY; Future  -     THYROTROPIN RECEPTOR ANTIBODY; Future  2. Adjustment disorder with anxious mood  Stable;  Continue current regimen. Stop vistaril. Begin propranolol. Risks and benefits discussed. 3. Rib pain  Stable;  XR ordered. Encouraged ice PRN. -     XR RIBS RIGHT INCLUDE CHEST (MIN 3 VIEWS); Future      Return in about 4 weeks (around 3/1/2022). Subjective   SUBJECTIVE/OBJECTIVE:  HPI  Normal thyroid uptake scan  Abnormal thyroid labs  Cannot get in with endocrine until May  Has gained weight back    Anxiety  Started Lexapro  Is going in the right direction  Kids have noticed a difference- less irritable  Vistaril- is not helping, even increased to 50 mg  Buspar- low feelings    Was wrestling on the couch  Squeezed and heard a pop on right rib  Can feel and is tender  No cough or shortness of breath  Cannot take a deep breath  Has not tried anything    Used to have paraguard- got removed once had issues  Hx of needing lovenox- dx with MTHFR (had 5 miscarriages)    Is not sure about having more babies    Review of Systems       Objective   Physical Exam  Vitals reviewed. Constitutional:       Appearance: Normal appearance. HENT:      Head: Normocephalic. Right Ear: External ear normal.      Left Ear: External ear normal.   Cardiovascular:      Rate and Rhythm: Normal rate and regular rhythm. Pulses: Normal pulses. Heart sounds: Normal heart sounds, S1 normal and S2 normal.   Pulmonary:      Effort: Pulmonary effort is normal.      Breath sounds: Normal breath sounds and air entry. Chest:      Chest wall: Tenderness present. Musculoskeletal:      Right lower leg: No edema. Left lower leg: No edema. Neurological:      Mental Status: She is alert. Psychiatric:         Mood and Affect: Mood normal.       An electronic signature was used to authenticate this note.     --Aimee Garg, CHARLEE - CNP

## 2022-02-10 ENCOUNTER — NURSE TRIAGE (OUTPATIENT)
Dept: OTHER | Facility: CLINIC | Age: 37
End: 2022-02-10

## 2022-02-10 NOTE — TELEPHONE ENCOUNTER
Received call from Mukesh Osuna at [facility] with Red Flag Complaint. Home test positive for COVID today    Current Symptoms: cough, headache, chills, nausea, nasal congestion, body aches    Speaking in complete sentences    Onset: 6 hours ago     Pain Severity: 5/10; Temperature: 101F by forehead thermometer    What has been tried: Tylenol, theraflu      Recommended disposition: Sofía Mojica 4196 advice provided, patient verbalizes understanding; denies any other questions or concerns; instructed to call back for any new or worsening symptoms. Home Care     Attention Provider: Thank you for allowing me to participate in the care of your patient. The patient was connected to triage in response to information provided to the [facility]/PSC. Please do not respond through this encounter as the response is not directed to a shared pool.       Reason for Disposition   [1] COVID-19 diagnosed by positive lab test (e.g., PCR, rapid self-test kit) AND [2] mild symptoms (e.g., cough, fever, others) AND [1] no complications or SOB    Protocols used: CORONAVIRUS (COVID-19) DIAGNOSED OR SUSPECTED-ADULT-

## 2022-02-28 RX ORDER — PROPRANOLOL HYDROCHLORIDE 10 MG/1
TABLET ORAL
Qty: 60 TABLET | Refills: 5 | Status: SHIPPED | OUTPATIENT
Start: 2022-02-28 | End: 2022-03-17

## 2022-02-28 NOTE — TELEPHONE ENCOUNTER
Medication:   Requested Prescriptions     Pending Prescriptions Disp Refills    propranolol (INDERAL) 10 MG tablet [Pharmacy Med Name: PROPRANOLOL 10MG TABLETS] 60 tablet 0     Sig: TAKE 1 TABLET BY MOUTH TWICE DAILY        Last Filled:      Patient Phone Number: [phone] (home)     Last appt: 2/1/2022   Next appt: Visit date not found    Last OARRS: No flowsheet data found.

## 2022-03-15 ENCOUNTER — TELEPHONE (OUTPATIENT)
Dept: FAMILY MEDICINE CLINIC | Age: 37
End: 2022-03-15

## 2022-03-15 NOTE — TELEPHONE ENCOUNTER
----- Message from [facility] sent at 3/15/2022  2:00 PM EDT -----  Subject: Message to Provider    QUESTIONS  Information for Provider? Pt. called In stated that she feels that   medication is not working. She is taking escitalopram 10mg once a day, and   propranolol 10mg 1 tablet by mouth twice a day. She has no car until after   6pm there no appointment that late she is willing to do VV but no VV   appointments showing she was wondering if PCP could do and appointment   over Phone or VV.  ---------------------------------------------------------------------------  --------------  CALL BACK INFO  What is the best way for the office to contact you? Do not leave any   message, patient will call back for answer  Preferred Call Back Phone Number? [phone]  ---------------------------------------------------------------------------  --------------  SCRIPT ANSWERS  Relationship to Patient?  Self

## 2022-03-17 ENCOUNTER — TELEMEDICINE (OUTPATIENT)
Dept: FAMILY MEDICINE CLINIC | Age: 37
End: 2022-03-17
Payer: COMMERCIAL

## 2022-03-17 DIAGNOSIS — F43.22 ADJUSTMENT DISORDER WITH ANXIOUS MOOD: Primary | ICD-10-CM

## 2022-03-17 PROCEDURE — 99214 OFFICE O/P EST MOD 30 MIN: CPT | Performed by: NURSE PRACTITIONER

## 2022-03-17 PROCEDURE — 4004F PT TOBACCO SCREEN RCVD TLK: CPT | Performed by: NURSE PRACTITIONER

## 2022-03-17 PROCEDURE — G8420 CALC BMI NORM PARAMETERS: HCPCS | Performed by: NURSE PRACTITIONER

## 2022-03-17 PROCEDURE — G8427 DOCREV CUR MEDS BY ELIG CLIN: HCPCS | Performed by: NURSE PRACTITIONER

## 2022-03-17 PROCEDURE — G8484 FLU IMMUNIZE NO ADMIN: HCPCS | Performed by: NURSE PRACTITIONER

## 2022-03-17 RX ORDER — ESCITALOPRAM OXALATE 20 MG/1
20 TABLET ORAL DAILY
Qty: 30 TABLET | Refills: 2 | Status: SHIPPED | OUTPATIENT
Start: 2022-03-17

## 2022-03-17 RX ORDER — LEVOMEFOLATE/ALGAL OIL 15-90.314
1 CAPSULE ORAL DAILY
Qty: 30 CAPSULE | Refills: 2 | Status: SHIPPED | OUTPATIENT
Start: 2022-03-17 | End: 2022-05-25

## 2022-03-17 NOTE — PROGRESS NOTES
Venecia Quiñones (:  1985) is a Established patient, here for evaluation of the following:    Assessment & Plan   Below is the assessment and plan developed based on review of pertinent history, physical exam, labs, studies, and medications. 1. Adjustment disorder with anxious mood  Stable; Not progressing; Increase Lexapro and add Deplin based on genesight results. -     escitalopram (LEXAPRO) 20 MG tablet; Take 1 tablet by mouth daily, Disp-30 tablet, R-2Normal    Return in about 2 weeks (around 3/31/2022). Subjective   HPI   Feels like there is a little something missing  A little jittery anxiety  Has gained weight 130-132- eating is better   switched jobs- has been helpful- eat dinner together  Likes the Lexapro- has seen some change  Propranolol- does not seem like it is helping  Buspar- low feeling, muscles in legs feel restless  Vistaril- is not helping  Wellbutrin- makes her crazy    Review of Systems       Objective   Patient-Reported Vitals  Patient-Reported Weight: 130  Patient-Reported Height: 5'5\"       Physical Exam  Vitals reviewed. Constitutional:       Appearance: Normal appearance. HENT:      Head: Normocephalic. Right Ear: External ear normal.      Left Ear: External ear normal.      Nose: Nose normal.   Pulmonary:      Effort: No respiratory distress. Neurological:      Mental Status: She is alert. Psychiatric:         Mood and Affect: Mood normal.         Venecia Quiñones, was evaluated through a synchronous (real-time) audio-video encounter. The patient (or guardian if applicable) is aware that this is a billable service, which includes applicable co-pays. This Virtual Visit was conducted with patient's (and/or legal guardian's) consent. The visit was conducted pursuant to the emergency declaration under the [facility], [address] [place] [address] authority and the [employer] and [place] [employer] General Act. Patient identification was verified, and a caregiver was present when appropriate. The patient was located at home in a state where the provider was licensed to provide care.        --CHARLEE Hogan - CNP

## 2022-04-08 ENCOUNTER — TELEPHONE (OUTPATIENT)
Dept: FAMILY MEDICINE CLINIC | Age: 37
End: 2022-04-08

## 2022-04-08 NOTE — TELEPHONE ENCOUNTER
----- Message from Caryl Nelson sent at 4/8/2022  8:46 AM EDT -----  Subject: Appointment Request    Reason for Call: Routine Existing Condition Follow Up    QUESTIONS  Type of Appointment? Established Patient  Reason for appointment request? Available appointments did not meet   patient need  Additional Information for Provider? pt calling to get a VV f/u appt for   anxiety and last appt, please call to schedule a VV with the pt   ---------------------------------------------------------------------------  --------------  CALL BACK INFO  What is the best way for the office to contact you? Do not leave any   message, patient will call back for answer, OK to respond with electronic   message via [employer] portal (only for patients who have registered [employer]   account)  Preferred Call Back Phone Number? [phone]  ---------------------------------------------------------------------------  --------------  SCRIPT ANSWERS  Relationship to Patient? Self  Is this follow up request related to routine Diabetes Management? No  Have you been diagnosed with, awaiting test results for, or told that you   are suspected of having COVID-19 (Coronavirus)? (If patient has tested   negative or was tested as a requirement for work, school, or travel and   not based on symptoms, answer no)? No  Within the past 10 days have you developed any of the following symptoms   (answer no if symptoms have been present longer than 10 days or began   more than 10 days ago)? Fever or Chills, Cough, Shortness of breath or   difficulty breathing, Loss of taste or smell, Sore throat, Nasal   congestion, Sneezing or runny nose, Fatigue or generalized body aches   (answer no if pain is specific to a body part e.g. back pain), Diarrhea,   Headache? No  Have you had close contact with someone with COVID-19 in the last 7 days? No  (Service Expert  click yes below to proceed with [facility] [facility] As Usual   Scheduling)?  Yes

## 2022-04-14 ENCOUNTER — TELEPHONE (OUTPATIENT)
Dept: ORTHOPEDIC SURGERY | Age: 37
End: 2022-04-14

## 2022-04-14 ENCOUNTER — OFFICE VISIT (OUTPATIENT)
Dept: ORTHOPEDIC SURGERY | Age: 37
End: 2022-04-14
Payer: COMMERCIAL

## 2022-04-14 VITALS — BODY MASS INDEX: 22.99 KG/M2 | WEIGHT: 138 LBS | HEIGHT: 65 IN

## 2022-04-14 DIAGNOSIS — M25.561 RIGHT KNEE PAIN, UNSPECIFIED CHRONICITY: Primary | ICD-10-CM

## 2022-04-14 PROCEDURE — 99204 OFFICE O/P NEW MOD 45 MIN: CPT | Performed by: ORTHOPAEDIC SURGERY

## 2022-04-14 PROCEDURE — 4004F PT TOBACCO SCREEN RCVD TLK: CPT | Performed by: ORTHOPAEDIC SURGERY

## 2022-04-14 PROCEDURE — G8427 DOCREV CUR MEDS BY ELIG CLIN: HCPCS | Performed by: ORTHOPAEDIC SURGERY

## 2022-04-14 PROCEDURE — G8420 CALC BMI NORM PARAMETERS: HCPCS | Performed by: ORTHOPAEDIC SURGERY

## 2022-04-14 NOTE — TELEPHONE ENCOUNTER
General Question     Subject: PATIENT IS NEEDING XRAYS TO SEE IF THE STINTS   PATIENT IS NEEDING A NEW MRI ORDER.    Patient: Lambert Robles Number: [phone]

## 2022-04-14 NOTE — Clinical Note
Dear Bret Reynoso,    I had the pleasure of evaluating your patient in my office today. Please see the attached note for full details of the visit.     With kind regards,  Vipin Wise MD

## 2022-04-14 NOTE — TELEPHONE ENCOUNTER
General Question     Subject: Patient returned the call and now wants to know where to get the xr done.   Patient:[place], [address] [facility]

## 2022-04-14 NOTE — PROGRESS NOTES
2022     Reason for visit:  Right knee injury on 4/10/2022 following right ACL reconstruction and medial meniscus repair    History of Present Illness: The patient is a 52-year-old female who presents for evaluation. She reports that she injured her self on 4/10/2022. At that time she was moving in her bed when she twisted her knee. She felt a popping sensation followed by pain. Ever since then she reports locking of the knee. She has limited extension and flexion. She reports difficulty bearing weight. She has been using crutches. Of note, the patient did undergo a right knee ACL reconstruction with hamstring autograft and medial meniscus repair in May 2021 by Dr. Carlee Mckee at [facility]. Report that she did well following that surgery up until this recent event.     Medical History:  Past Medical History:   Diagnosis Date    Migraine     Ureteral reflux       Past Surgical History:   Procedure Laterality Date    ANTERIOR CRUCIATE LIGAMENT REPAIR Right     BLADDER SURGERY      for reflux    MENISCECTOMY Right     URETER STENT PLACEMENT        Family History   Problem Relation Age of Onset    Heart Disease Mother     No Known Problems Father     No Known Problems Sister     No Known Problems Brother     No Known Problems Maternal Grandmother     No Known Problems Maternal Grandfather     No Known Problems Paternal Grandmother     No Known Problems Paternal Grandfather     No Known Problems Daughter     Heart Defect Daughter     Heart Defect Daughter       Social History     Socioeconomic History    Marital status: Single     Spouse name: Not on file    Number of children: Not on file    Years of education: Not on file    Highest education level: Not on file   Occupational History    Not on file   Tobacco Use    Smoking status: Former Smoker     Packs/day: 0.50     Types: Cigarettes     Quit date: 2019     Years since quittin.5    Smokeless tobacco: Current User   [employer] Tobacco comment: Vaps   Vaping Use    Vaping Use: Every day    Substances: Nicotine   Substance and Sexual Activity    Alcohol use: Yes     Comment: social    Drug use: No    Sexual activity: Yes     Partners: Male   Other Topics Concern    Not on file   Social History Narrative    Not on file     Social Determinants of Health     Financial Resource Strain: Low Risk     Difficulty of Paying Living Expenses: Not hard at all   Food Insecurity: No Food Insecurity    Worried About [address] [employer] in the Last Year: Never true    [facility] in the Last Year: Never true   Transportation Needs:     Lack of Transportation (Medical): Not on file    Lack of Transportation (Non-Medical):  Not on file   Physical Activity:     Days of Exercise per Week: Not on file    Minutes of Exercise per Session: Not on file   Stress:     Feeling of Stress : Not on file   Social Connections:     Frequency of Communication with Friends and Family: Not on file    Frequency of Social Gatherings with Friends and Family: Not on file    Attends Baptism Services: Not on file    Active Member of [address] [place] [address] or Organizations: Not on file    Attends Club or Organization Meetings: Not on file    Marital Status: Not on file   Intimate Partner Violence:     Fear of Current or Ex-Partner: Not on file    Emotionally Abused: Not on file    Physically Abused: Not on file    Sexually Abused: Not on file   Housing Stability:     Unable to Pay for Housing in the Last Year: Not on file    Number of [place] in the Last Year: Not on file    Unstable Housing in the Last Year: Not on file      Current Outpatient Medications on File Prior to Visit   Medication Sig Dispense Refill    L-Methylfolate-Algae (DEPLIN 15) 15-90.314 MG CAPS Take 1 capsule by mouth daily 30 capsule 2    escitalopram (LEXAPRO) 20 MG tablet Take 1 tablet by mouth daily 30 tablet 2    pantoprazole (PROTONIX) 40 MG tablet TAKE 1 TABLET BY MOUTH EVERY DAY  SUMAtriptan (IMITREX) 25 MG tablet Take 1 tablet by mouth once as needed for Migraine May repeat in 2 hours if not improving- max daily 200mg. 20 tablet 2     No current facility-administered medications on file prior to visit. Allergies   Allergen Reactions    Keflex [Cephalexin] Rash        Review of Systems:  Constitutional: Patient is adequately groomed with no evidence of malnutrition  Mental Status: The patient is oriented to time, place and person. The patient's mood and affect are appropriate. Lymphatic: The lymphatic examination bilaterally reveals all areas to be without enlargement or induration. Vascular: Examination reveals no swelling or calf tenderness. Peripheral pulses are palpable and 2+. Neurological: The patient has good coordination. There is no weakness or sensory deficit. Skin:  Head/Neck: inspection reveals no rashes, ulcerations or lesions. Trunk: inspection reveals no rashes, ulcerations or lesions. Objective:  Ht 5' 5\" (1.651 m)   Wt 138 lb (62.6 kg)   BMI 22.96 kg/m²      Physical Exam:  The patient is well-appearing and in no apparent distress  Examination of the right knee   There is a + effusion, no gross deformity or skin changes  Difficult to assess ligamentous stability given patient's pain and guarding  She does have medial and lateral joint line tenderness  5 out of 5 strength throughout distal muscle groups  Sensation is intact to light touch throughout all distributions  There is no calf swelling or tenderness  Palpable DP pulse, brisk cap refill, 2+ symmetric reflexes    Imagin view x-rays of the right knee were obtained the office today on 2022. There is no fracture or dislocation. Postop changes consistent with ACL reconstruction. Metallic button along the lateral cortex of the distal femur from ACL reconstruction.       Assessment:  Right knee injury sustained on 4/10/2022 following ACL reconstruction with hamstring autograft and medial meniscus repair in May 2021. Concern for displaced meniscus tear    Plan:  I discussed the patient the differential diagnosis. Her exam is concerning today as well as the fact that she has significant locking and difficulty bearing weight. I would recommend an MRI for further evaluation. She is in agreement. Following the study she will return to review the results in the office. Greater than 45 minutes were spent with this encounter. Time spent included evaluating the patient's chart prior to arrival.  Evaluating the patient in the office including history, physical examination, imaging reviewing, and counseling on next steps. Lastly, time was spent discussing orders with my staff as well as providing documentation in the chart. Natalya Figueroa MD            Orthopaedic Surgery Sports Medicine and [address] and [address]            Team Physician [facility]      Disclaimer: This note was dictated with voice recognition software. Though review and correction are routine, we apologize for any errors.

## 2022-04-18 NOTE — TELEPHONE ENCOUNTER
SPOKE WITH PT. LT HER KNOW IT WOULD BE A WALK IN ORDER FOR XR. SHE IS GOING TO GO TO [facility] AND GET THIS COMPLETED. ONCE SHE HAS IT DONE, IF NOT TODAY, SHE IS GOING TO CALL BACK AND LET US KNOW IT HAS BEEN COMPLETED SO I MAY F/U WITH MRI DEPARTMENT TO MAKE SURE THEY HAVE EVERYTHING THEY NEED TO MOVE FORWARD. ALL QUESTIONS ANSWERED. PATIENT EXPRESSED UNDERSTANDING.

## 2022-04-18 NOTE — TELEPHONE ENCOUNTER
General Question      Subject:Patient called wanting updates on where to get xr done .    Patient:[place], [address] [facility]

## 2022-04-19 ENCOUNTER — HOSPITAL ENCOUNTER (OUTPATIENT)
Age: 37
Discharge: HOME | End: 2022-04-19
Payer: COMMERCIAL

## 2022-04-19 ENCOUNTER — HOSPITAL ENCOUNTER (OUTPATIENT)
Dept: GENERAL RADIOLOGY | Age: 37
Discharge: HOME | End: 2022-04-19
Payer: COMMERCIAL

## 2022-04-19 DIAGNOSIS — M25.561 RIGHT KNEE PAIN, UNSPECIFIED CHRONICITY: ICD-10-CM

## 2022-04-19 DIAGNOSIS — R79.89 LOW TSH LEVEL: ICD-10-CM

## 2022-04-19 LAB
ANTI-THYROGLOB ABS: 629 IU/ML
T3 FREE: 3 PG/ML (ref 2.3–4.2)
T4 FREE: 0.8 NG/DL (ref 0.9–1.8)
THYROID PEROXIDASE (TPO) ABS: 235 IU/ML
TSH SERPL DL<=0.05 MIU/L-ACNC: 0.32 UIU/ML (ref 0.27–4.2)

## 2022-04-19 PROCEDURE — 84445 ASSAY OF TSI GLOBULIN: CPT

## 2022-04-19 PROCEDURE — 36415 COLL VENOUS BLD VENIPUNCTURE: CPT

## 2022-04-19 PROCEDURE — 83520 IMMUNOASSAY QUANT NOS NONAB: CPT

## 2022-04-19 PROCEDURE — 84439 ASSAY OF FREE THYROXINE: CPT

## 2022-04-19 PROCEDURE — 74018 RADEX ABDOMEN 1 VIEW: CPT

## 2022-04-19 PROCEDURE — 86800 THYROGLOBULIN ANTIBODY: CPT

## 2022-04-19 PROCEDURE — 84443 ASSAY THYROID STIM HORMONE: CPT

## 2022-04-19 PROCEDURE — 86376 MICROSOMAL ANTIBODY EACH: CPT

## 2022-04-19 PROCEDURE — 84481 FREE ASSAY (FT-3): CPT

## 2022-04-20 ENCOUNTER — TELEPHONE (OUTPATIENT)
Dept: ORTHOPEDIC SURGERY | Age: 37
End: 2022-04-20

## 2022-04-20 NOTE — TELEPHONE ENCOUNTER
I have a call out to MRI department at [facility] to confirm with KUB abdomen XR that MRI is compatible. They will return call with update to Annie's line.

## 2022-04-20 NOTE — TELEPHONE ENCOUNTER
General Question     Subject: PATIENT STATES THAT SHE RECEIVED HER XRS AND NOW IS WISHING FOR AN MRI ORDER FOR R KNEE.  PLEASE ADVISE   Patient: Brennon Chapman  Contact Number: [phone]

## 2022-04-20 NOTE — TELEPHONE ENCOUNTER
Spoke with pt letting her know that an MRI order has already been put in the system.  I gave her the number to call and schedule

## 2022-04-21 LAB
THYROID STIMULATING IMMUNOGLOBULIN: 4.71 IU/L
TSH RECEPTOR AB: 5.23 IU/L

## 2022-05-02 ENCOUNTER — HOSPITAL ENCOUNTER (OUTPATIENT)
Dept: MRI IMAGING | Age: 37
Discharge: HOME | End: 2022-05-02
Payer: COMMERCIAL

## 2022-05-02 DIAGNOSIS — M25.561 RIGHT KNEE PAIN, UNSPECIFIED CHRONICITY: ICD-10-CM

## 2022-05-02 PROCEDURE — 73721 MRI JNT OF LWR EXTRE W/O DYE: CPT

## 2022-05-03 ENCOUNTER — OFFICE VISIT (OUTPATIENT)
Dept: ORTHOPEDIC SURGERY | Age: 37
End: 2022-05-03
Payer: COMMERCIAL

## 2022-05-03 VITALS — HEIGHT: 65 IN | WEIGHT: 140 LBS | BODY MASS INDEX: 23.32 KG/M2

## 2022-05-03 DIAGNOSIS — M25.561 RIGHT KNEE PAIN, UNSPECIFIED CHRONICITY: Primary | ICD-10-CM

## 2022-05-03 DIAGNOSIS — S83.211A BUCKET-HANDLE TEAR OF MEDIAL MENISCUS OF RIGHT KNEE AS CURRENT INJURY, INITIAL ENCOUNTER: ICD-10-CM

## 2022-05-03 PROCEDURE — G8427 DOCREV CUR MEDS BY ELIG CLIN: HCPCS | Performed by: ORTHOPAEDIC SURGERY

## 2022-05-03 PROCEDURE — G8420 CALC BMI NORM PARAMETERS: HCPCS | Performed by: ORTHOPAEDIC SURGERY

## 2022-05-03 PROCEDURE — 4004F PT TOBACCO SCREEN RCVD TLK: CPT | Performed by: ORTHOPAEDIC SURGERY

## 2022-05-03 PROCEDURE — 99215 OFFICE O/P EST HI 40 MIN: CPT | Performed by: ORTHOPAEDIC SURGERY

## 2022-05-04 ENCOUNTER — TELEPHONE (OUTPATIENT)
Dept: ORTHOPEDIC SURGERY | Age: 37
End: 2022-05-04

## 2022-05-04 ENCOUNTER — HOSPITAL ENCOUNTER (OUTPATIENT)
Dept: CT IMAGING | Age: 37
Discharge: HOME | End: 2022-05-04
Payer: COMMERCIAL

## 2022-05-04 DIAGNOSIS — G89.18 POST-OP PAIN: Primary | ICD-10-CM

## 2022-05-04 DIAGNOSIS — M25.561 RIGHT KNEE PAIN, UNSPECIFIED CHRONICITY: ICD-10-CM

## 2022-05-04 DIAGNOSIS — S83.211A BUCKET-HANDLE TEAR OF MEDIAL MENISCUS OF RIGHT KNEE AS CURRENT INJURY, INITIAL ENCOUNTER: Primary | ICD-10-CM

## 2022-05-04 PROCEDURE — 73700 CT LOWER EXTREMITY W/O DYE: CPT

## 2022-05-04 NOTE — TELEPHONE ENCOUNTER
[address] SX AT 2PM 5/6/22 WITH ARRIVAL OF 12. ASKED RETURN CALL TO CONFIRM SHE UNDERSTANDS THIS INFO. ALSO HAVE PLACED HER PO PT, SO SHE MAY SCHEDULE THAT FOR Monday.

## 2022-05-04 NOTE — PROGRESS NOTES
5/3/2022     Reason for visit:  Right knee injury on 4/10/2022 following right ACL reconstruction and medial meniscus repair    History of Present Illness: The patient is a 71-year-old female who presents for evaluation. She reports that she injured her self on 4/10/2022. At that time she was moving in her bed when she twisted her knee. She felt a popping sensation followed by pain. Ever since then she reports locking of the knee. She has limited extension and flexion. She reports difficulty bearing weight. She has been using crutches. Of note, the patient did undergo a right knee ACL reconstruction with hamstring autograft and medial meniscus repair in May 2021 by Dr. Marilin Boo at [facility]. Report that she did well following that surgery up until this recent event. Objective:  Ht 5' 5\" (1.651 m)   Wt 140 lb (63.5 kg)   BMI 23.30 kg/m²      Physical Exam:  The patient is well-appearing and in no apparent distress  Examination of the right knee   There is a + effusion, no gross deformity or skin changes  Difficult to assess ligamentous stability given patient's pain and guarding  She does have medial and lateral joint line tenderness  5 out of 5 strength throughout distal muscle groups  Sensation is intact to light touch throughout all distributions  There is no calf swelling or tenderness  Palpable DP pulse, brisk cap refill, 2+ symmetric reflexes    Imagin view x-rays of the right knee were obtained the office today on 2022. There is no fracture or dislocation. Postop changes consistent with ACL reconstruction. Metallic button along the lateral cortex of the distal femur from ACL reconstruction. MRI of the right knee was reviewed. There is a bucket-handle tear of the medial meniscus present.   The ACL graft does appear to be intact however it does appear to be a vertical graft      Assessment:  Right knee injury sustained on 4/10/2022 following ACL reconstruction with hamstring autograft and medial meniscus repair in May 2021. MRI reveals bucket-handle tear of the medial meniscus    Plan:  I had a very long discussion with the patient. We spent time reviewing the imaging findings. She has a very complex constellation of findings. She does have a displaced bucket-handle tear of the medial meniscus. As a result of the displacement and nature of the injury this is a surgical condition. I would recommend a right knee arthroscopy with possible partial meniscectomy versus repair. She has had a meniscus repair in the past however it is unclear the exact circumstances surrounding the repair and how many sutures were placed. Regardless I do think it is possible for us to perform a revision repair if the tissue quality is overall good and appears to be amenable to it. However that would be an intraoperative decision. If the tissue quality is poor that we would have no choice but to perform partial meniscectomy. I did tell the patient that if repair is performed there is always a chance it would not heal and she would require additional surgery. Alternatively if partial meniscectomy was performed she could have pain as result of meniscal deficiency and be at risk for degeneration of that compartment in the future. She is aware of this. Lastly, if we perform a repair of the bucket-handle tear I would also assess the stability of her ACL with a Lachman and pivot shift test.  If her ACL appears to be incompetent then I would advocate for revision ACL reconstruction in order to protect the meniscus repair and ensure restoration of normal knee kinematics. As result of that I would recommend BTB autograft. I do suspect that the chances of us needing to revise the ACL low however it is possible that that would be required. We thoroughly discussed the risks of surgery. They were defined as but not limited to infection, bleeding, damage blood vessels or nerves, need for additional surgery. She does understand elects proceed. Greater than 45 minutes were spent with this encounter. Time spent included evaluating the patient's chart prior to arrival.  Evaluating the patient in the office including history, physical examination, imaging reviewing, and counseling on next steps. Lastly, time was spent discussing orders with my staff as well as providing documentation in the chart. Per Han MD            Orthopaedic Surgery Sports Medicine and [address]  and [address] [facility] Physician [facility] ([place]      Disclaimer: This note was dictated with voice recognition software. Though review and correction are routine, we apologize for any errors.

## 2022-05-05 RX ORDER — ASPIRIN 325 MG
325 TABLET, DELAYED RELEASE (ENTERIC COATED) ORAL DAILY
Qty: 14 TABLET | Refills: 0 | Status: SHIPPED | OUTPATIENT
Start: 2022-05-06 | End: 2022-05-31

## 2022-05-05 RX ORDER — ONDANSETRON 4 MG/1
4 TABLET, FILM COATED ORAL EVERY 8 HOURS PRN
Qty: 21 TABLET | Refills: 0 | Status: SHIPPED | OUTPATIENT
Start: 2022-05-06 | End: 2022-05-25

## 2022-05-05 RX ORDER — HYDROCODONE BITARTRATE AND ACETAMINOPHEN 10; 325 MG/1; MG/1
1 TABLET ORAL EVERY 4 HOURS PRN
Qty: 30 TABLET | Refills: 0 | Status: SHIPPED | OUTPATIENT
Start: 2022-05-06 | End: 2022-05-11

## 2022-05-05 NOTE — PROGRESS NOTES
Name_______________________________________Printed:____________________  Date and time of surgery___5/6/2022______1400_______________Arrival Time:____1200____________   1. The instructions given regarding when and if a patient needs to stop oral intake prior to surgery varies. Follow the specific instructions you were given                  _x__Nothing to eat or to drink after Midnight the night before.                   ____Carbo loading or ERAS instructions will be given to select patients-if you have been given those instructions -please do the following                           The evening before your surgery after dinner before midnight drink 40 ounces of gatorade. If you are diabetic use sugar free. The morning of surgery drink 40 ounces of water. This needs to be finished 3 hours prior to your surgery start time. 2. Take the following pills with a small sip of water on the morning of surgery___protonix, and lexapro________________________________________________                  Do not take blood pressure medications ending in pril or sartan the aidee prior to surgery or the morning of surgery_   3. Aspirin, Ibuprofen, Advil, Naproxen, Vitamin E and other Anti-inflammatory products and supplements should be stopped for 5 -7days before surgery or as directed by your physician. 4. Check with your Doctor regarding stopping Plavix, Coumadin,Eliquis, Lovenox,Effient,Pradaxa,Xarelto, Fragmin or other blood thinners and follow their instructions. 5. Do not smoke, and do not drink any alcoholic beverages 24 hours prior to surgery. This includes NA Beer. Refrain from the usage of any recreational drugs. 6. You may brush your teeth and gargle the morning of surgery. DO NOT SWALLOW WATER   7. You MUST make arrangements for a responsible adult to stay on site while you are here and take you home after your surgery. You will not be allowed to leave alone or drive yourself home.   It is strongly suggested someone stay with you the first 24 hrs. Your surgery will be cancelled if you do not have a ride home. 8. A parent/legal guardian must accompany a child scheduled for surgery and plan to stay at the hospital until the child is discharged. Please do not bring other children with you. 9. Please wear simple, loose fitting clothing to the hospital.  Gillian Cisse not bring valuables (money, credit cards, checkbooks, etc.) Do not wear any makeup (including no eye makeup) or nail polish on your fingers or toes. 10. DO NOT wear any jewelry or piercings on day of surgery. All body piercing jewelry must be removed. 11. If you have ___dentures, they will be removed before going to the OR; we will provide you a container. If you wear ___contact lenses or ___glasses, they will be removed; please bring a case for them. 12. Please see your family doctor/pediatrician for a history & physical and/or concerning medications. Bring any test results/reports from your physician's office. PCP__________________Phone___________H&P Appt. Date________             13 If you  have a Living Will and Durable Power of  for Healthcare, please bring in a copy. 15. Notify your Surgeon if you develop any illness between now and surgery  time, cough, cold, fever, sore throat, nausea, vomiting, etc.  Please notify your surgeon if you experience dizziness, shortness of breath or blurred vision between now & the time of your surgery             15. DO NOT shave your operative site 96 hours prior to surgery. For face & neck surgery, men may use an electric razor 48 hours prior to surgery. 16. Shower the night before or morning of surgery using an antibacterial soap or as you have been instructed. 17. To provide excellent care visitors will be limited to one in the room at any given time. 18.  Please bring picture ID and insurance card.              19.  Visit our web site for additional information:  [email or web address]              20.During flu season no children under the age of 15 are permitted in the hospital for the safety of all patients. 21. If you take a long acting insulin in the evening only  take half of your usual  dose the night  before your procedure              22. If you use a c-pap please bring DOS if staying overnight,             23.For your convenience [facility] has a pharmacy on site to fill your prescriptions. 24. If you use oxygen and have a portable tank please bring it  with you the DOS             25. Bring a complete list of all your medications with name and dose include any supplements. 26. Other__________________________________________   *Please call pre admission testing if you any further questions   [address]  [phone]   [address] [place] [address]       VISITOR POLICY(subject to change)    Current policy is 2 visitors per patient. No children. A mask is required. Visiting hours are 8a-8p. Overnight visitors will be at the discretion of the nurse. All above information reviewed with patient in person or by phone. Patient verbalizes understanding. All questions and concerns addressed.                                                                                                  Patient/Rep___patient_________________                                                                                                                                    PRE OP INSTRUCTIONS

## 2022-05-06 ENCOUNTER — ANESTHESIA (OUTPATIENT)
Dept: OPERATING ROOM | Age: 37
End: 2022-05-06
Payer: COMMERCIAL

## 2022-05-06 ENCOUNTER — HOSPITAL ENCOUNTER (OUTPATIENT)
Age: 37
Setting detail: OUTPATIENT SURGERY
Discharge: HOME | End: 2022-05-06
Attending: ORTHOPAEDIC SURGERY | Admitting: ORTHOPAEDIC SURGERY
Payer: COMMERCIAL

## 2022-05-06 ENCOUNTER — ANESTHESIA EVENT (OUTPATIENT)
Dept: OPERATING ROOM | Age: 37
End: 2022-05-06
Payer: COMMERCIAL

## 2022-05-06 VITALS
DIASTOLIC BLOOD PRESSURE: 52 MMHG | OXYGEN SATURATION: 98 % | SYSTOLIC BLOOD PRESSURE: 88 MMHG | TEMPERATURE: 96.1 F | RESPIRATION RATE: 4 BRPM

## 2022-05-06 VITALS
WEIGHT: 139 LBS | HEIGHT: 65 IN | SYSTOLIC BLOOD PRESSURE: 95 MMHG | OXYGEN SATURATION: 99 % | TEMPERATURE: 97.7 F | DIASTOLIC BLOOD PRESSURE: 63 MMHG | HEART RATE: 56 BPM | BODY MASS INDEX: 23.16 KG/M2 | RESPIRATION RATE: 14 BRPM

## 2022-05-06 LAB — HCG(URINE) PREGNANCY TEST: NEGATIVE

## 2022-05-06 PROCEDURE — 3600000004 HC SURGERY LEVEL 4 BASE: Performed by: ORTHOPAEDIC SURGERY

## 2022-05-06 PROCEDURE — 6360000002 HC RX W HCPCS: Performed by: ORTHOPAEDIC SURGERY

## 2022-05-06 PROCEDURE — 2720000010 HC SURG SUPPLY STERILE: Performed by: ORTHOPAEDIC SURGERY

## 2022-05-06 PROCEDURE — 6370000000 HC RX 637 (ALT 250 FOR IP): Performed by: ANESTHESIOLOGY

## 2022-05-06 PROCEDURE — 6360000002 HC RX W HCPCS: Performed by: NURSE ANESTHETIST, CERTIFIED REGISTERED

## 2022-05-06 PROCEDURE — 7100000001 HC PACU RECOVERY - ADDTL 15 MIN: Performed by: ORTHOPAEDIC SURGERY

## 2022-05-06 PROCEDURE — 2709999900 HC NON-CHARGEABLE SUPPLY: Performed by: ORTHOPAEDIC SURGERY

## 2022-05-06 PROCEDURE — 3700000001 HC ADD 15 MINUTES (ANESTHESIA): Performed by: ORTHOPAEDIC SURGERY

## 2022-05-06 PROCEDURE — 2500000003 HC RX 250 WO HCPCS: Performed by: ANESTHESIOLOGY

## 2022-05-06 PROCEDURE — 6360000002 HC RX W HCPCS: Performed by: ANESTHESIOLOGY

## 2022-05-06 PROCEDURE — 84703 CHORIONIC GONADOTROPIN ASSAY: CPT

## 2022-05-06 PROCEDURE — 2500000003 HC RX 250 WO HCPCS: Performed by: ORTHOPAEDIC SURGERY

## 2022-05-06 PROCEDURE — 7100000000 HC PACU RECOVERY - FIRST 15 MIN: Performed by: ORTHOPAEDIC SURGERY

## 2022-05-06 PROCEDURE — 3600000014 HC SURGERY LEVEL 4 ADDTL 15MIN: Performed by: ORTHOPAEDIC SURGERY

## 2022-05-06 PROCEDURE — 7100000010 HC PHASE II RECOVERY - FIRST 15 MIN: Performed by: ORTHOPAEDIC SURGERY

## 2022-05-06 PROCEDURE — 2580000003 HC RX 258: Performed by: ORTHOPAEDIC SURGERY

## 2022-05-06 PROCEDURE — 7100000011 HC PHASE II RECOVERY - ADDTL 15 MIN: Performed by: ORTHOPAEDIC SURGERY

## 2022-05-06 PROCEDURE — 2500000003 HC RX 250 WO HCPCS: Performed by: NURSE ANESTHETIST, CERTIFIED REGISTERED

## 2022-05-06 PROCEDURE — C1713 ANCHOR/SCREW BN/BN,TIS/BN: HCPCS | Performed by: ORTHOPAEDIC SURGERY

## 2022-05-06 PROCEDURE — 3700000000 HC ANESTHESIA ATTENDED CARE: Performed by: ORTHOPAEDIC SURGERY

## 2022-05-06 DEVICE — FAST-FIX 360 CURVED MENISCAL                                    REPAIR SYSTEM
Type: IMPLANTABLE DEVICE | Site: KNEE | Status: FUNCTIONAL
Brand: FAST-FIX

## 2022-05-06 RX ORDER — CLINDAMYCIN PHOSPHATE 900 MG/50ML
900 INJECTION INTRAVENOUS ONCE
Status: COMPLETED | OUTPATIENT
Start: 2022-05-06 | End: 2022-05-06

## 2022-05-06 RX ORDER — GLYCOPYRROLATE 0.2 MG/ML
INJECTION INTRAMUSCULAR; INTRAVENOUS PRN
Status: DISCONTINUED | OUTPATIENT
Start: 2022-05-06 | End: 2022-05-06 | Stop reason: SDUPTHER

## 2022-05-06 RX ORDER — LABETALOL HYDROCHLORIDE 5 MG/ML
10 INJECTION, SOLUTION INTRAVENOUS
Status: DISCONTINUED | OUTPATIENT
Start: 2022-05-06 | End: 2022-05-06 | Stop reason: HOSPADM

## 2022-05-06 RX ORDER — SODIUM CHLORIDE 0.9 % (FLUSH) 0.9 %
5-40 SYRINGE (ML) INJECTION EVERY 12 HOURS SCHEDULED
Status: DISCONTINUED | OUTPATIENT
Start: 2022-05-06 | End: 2022-05-06 | Stop reason: HOSPADM

## 2022-05-06 RX ORDER — SODIUM CHLORIDE 0.9 % (FLUSH) 0.9 %
5-40 SYRINGE (ML) INJECTION PRN
Status: DISCONTINUED | OUTPATIENT
Start: 2022-05-06 | End: 2022-05-06 | Stop reason: HOSPADM

## 2022-05-06 RX ORDER — ONDANSETRON 2 MG/ML
4 INJECTION INTRAMUSCULAR; INTRAVENOUS
Status: COMPLETED | OUTPATIENT
Start: 2022-05-06 | End: 2022-05-06

## 2022-05-06 RX ORDER — LIDOCAINE HYDROCHLORIDE 10 MG/ML
1 INJECTION, SOLUTION EPIDURAL; INFILTRATION; INTRACAUDAL; PERINEURAL
Status: DISCONTINUED | OUTPATIENT
Start: 2022-05-06 | End: 2022-05-06 | Stop reason: HOSPADM

## 2022-05-06 RX ORDER — HYDROMORPHONE HCL 110MG/55ML
PATIENT CONTROLLED ANALGESIA SYRINGE INTRAVENOUS PRN
Status: DISCONTINUED | OUTPATIENT
Start: 2022-05-06 | End: 2022-05-06 | Stop reason: SDUPTHER

## 2022-05-06 RX ORDER — SODIUM CHLORIDE, SODIUM LACTATE, POTASSIUM CHLORIDE, CALCIUM CHLORIDE 600; 310; 30; 20 MG/100ML; MG/100ML; MG/100ML; MG/100ML
INJECTION, SOLUTION INTRAVENOUS CONTINUOUS
Status: DISCONTINUED | OUTPATIENT
Start: 2022-05-06 | End: 2022-05-06 | Stop reason: HOSPADM

## 2022-05-06 RX ORDER — HYDROMORPHONE HCL 110MG/55ML
0.5 PATIENT CONTROLLED ANALGESIA SYRINGE INTRAVENOUS EVERY 5 MIN PRN
Status: DISCONTINUED | OUTPATIENT
Start: 2022-05-06 | End: 2022-05-06 | Stop reason: HOSPADM

## 2022-05-06 RX ORDER — LIDOCAINE HYDROCHLORIDE 10 MG/ML
INJECTION, SOLUTION EPIDURAL; INFILTRATION; INTRACAUDAL; PERINEURAL PRN
Status: DISCONTINUED | OUTPATIENT
Start: 2022-05-06 | End: 2022-05-06 | Stop reason: SDUPTHER

## 2022-05-06 RX ORDER — HYDRALAZINE HYDROCHLORIDE 20 MG/ML
10 INJECTION INTRAMUSCULAR; INTRAVENOUS
Status: DISCONTINUED | OUTPATIENT
Start: 2022-05-06 | End: 2022-05-06 | Stop reason: HOSPADM

## 2022-05-06 RX ORDER — OXYCODONE HYDROCHLORIDE 5 MG/1
5 TABLET ORAL
Status: COMPLETED | OUTPATIENT
Start: 2022-05-06 | End: 2022-05-06

## 2022-05-06 RX ORDER — PROPOFOL 10 MG/ML
INJECTION, EMULSION INTRAVENOUS PRN
Status: DISCONTINUED | OUTPATIENT
Start: 2022-05-06 | End: 2022-05-06 | Stop reason: SDUPTHER

## 2022-05-06 RX ORDER — MEPERIDINE HYDROCHLORIDE 25 MG/ML
12.5 INJECTION INTRAMUSCULAR; INTRAVENOUS; SUBCUTANEOUS EVERY 5 MIN PRN
Status: DISCONTINUED | OUTPATIENT
Start: 2022-05-06 | End: 2022-05-06 | Stop reason: HOSPADM

## 2022-05-06 RX ORDER — SODIUM CHLORIDE 9 MG/ML
INJECTION, SOLUTION INTRAVENOUS PRN
Status: DISCONTINUED | OUTPATIENT
Start: 2022-05-06 | End: 2022-05-06 | Stop reason: HOSPADM

## 2022-05-06 RX ORDER — DEXAMETHASONE SODIUM PHOSPHATE 4 MG/ML
INJECTION, SOLUTION INTRA-ARTICULAR; INTRALESIONAL; INTRAMUSCULAR; INTRAVENOUS; SOFT TISSUE PRN
Status: DISCONTINUED | OUTPATIENT
Start: 2022-05-06 | End: 2022-05-06 | Stop reason: SDUPTHER

## 2022-05-06 RX ORDER — APREPITANT 40 MG/1
40 CAPSULE ORAL ONCE
Status: COMPLETED | OUTPATIENT
Start: 2022-05-06 | End: 2022-05-06

## 2022-05-06 RX ORDER — FENTANYL CITRATE 50 UG/ML
INJECTION, SOLUTION INTRAMUSCULAR; INTRAVENOUS PRN
Status: DISCONTINUED | OUTPATIENT
Start: 2022-05-06 | End: 2022-05-06 | Stop reason: SDUPTHER

## 2022-05-06 RX ORDER — ONDANSETRON 2 MG/ML
INJECTION INTRAMUSCULAR; INTRAVENOUS PRN
Status: DISCONTINUED | OUTPATIENT
Start: 2022-05-06 | End: 2022-05-06 | Stop reason: SDUPTHER

## 2022-05-06 RX ORDER — MAGNESIUM HYDROXIDE 1200 MG/15ML
LIQUID ORAL CONTINUOUS PRN
Status: COMPLETED | OUTPATIENT
Start: 2022-05-06 | End: 2022-05-06

## 2022-05-06 RX ORDER — LIDOCAINE HYDROCHLORIDE 10 MG/ML
0.5 INJECTION, SOLUTION EPIDURAL; INFILTRATION; INTRACAUDAL; PERINEURAL ONCE
Status: DISCONTINUED | OUTPATIENT
Start: 2022-05-06 | End: 2022-05-06 | Stop reason: HOSPADM

## 2022-05-06 RX ORDER — KETOROLAC TROMETHAMINE 30 MG/ML
INJECTION, SOLUTION INTRAMUSCULAR; INTRAVENOUS PRN
Status: DISCONTINUED | OUTPATIENT
Start: 2022-05-06 | End: 2022-05-06 | Stop reason: SDUPTHER

## 2022-05-06 RX ADMIN — FENTANYL CITRATE 50 MCG: 50 INJECTION, SOLUTION INTRAMUSCULAR; INTRAVENOUS at 14:52

## 2022-05-06 RX ADMIN — GLYCOPYRROLATE 0.2 MG: 0.2 INJECTION, SOLUTION INTRAMUSCULAR; INTRAVENOUS at 14:57

## 2022-05-06 RX ADMIN — FENTANYL CITRATE 25 MCG: 50 INJECTION, SOLUTION INTRAMUSCULAR; INTRAVENOUS at 15:28

## 2022-05-06 RX ADMIN — HYDROMORPHONE HYDROCHLORIDE 0.5 MG: 2 INJECTION, SOLUTION INTRAMUSCULAR; INTRAVENOUS; SUBCUTANEOUS at 17:29

## 2022-05-06 RX ADMIN — ONDANSETRON 4 MG: 2 INJECTION INTRAMUSCULAR; INTRAVENOUS at 14:21

## 2022-05-06 RX ADMIN — KETOROLAC TROMETHAMINE 30 MG: 30 INJECTION, SOLUTION INTRAMUSCULAR; INTRAVENOUS at 16:16

## 2022-05-06 RX ADMIN — APREPITANT 40 MG: 40 CAPSULE ORAL at 13:39

## 2022-05-06 RX ADMIN — FENTANYL CITRATE 25 MCG: 50 INJECTION, SOLUTION INTRAMUSCULAR; INTRAVENOUS at 15:48

## 2022-05-06 RX ADMIN — HYDROMORPHONE HYDROCHLORIDE 0.5 MG: 2 INJECTION, SOLUTION INTRAMUSCULAR; INTRAVENOUS; SUBCUTANEOUS at 17:22

## 2022-05-06 RX ADMIN — LIDOCAINE HYDROCHLORIDE 40 MG: 10 INJECTION, SOLUTION EPIDURAL; INFILTRATION; INTRACAUDAL; PERINEURAL at 14:18

## 2022-05-06 RX ADMIN — OXYCODONE 5 MG: 5 TABLET ORAL at 17:54

## 2022-05-06 RX ADMIN — DEXAMETHASONE SODIUM PHOSPHATE 6 MG: 4 INJECTION, SOLUTION INTRAMUSCULAR; INTRAVENOUS at 14:21

## 2022-05-06 RX ADMIN — FENTANYL CITRATE 50 MCG: 50 INJECTION, SOLUTION INTRAMUSCULAR; INTRAVENOUS at 14:15

## 2022-05-06 RX ADMIN — ONDANSETRON 4 MG: 2 INJECTION INTRAMUSCULAR; INTRAVENOUS at 18:19

## 2022-05-06 RX ADMIN — SODIUM CHLORIDE, POTASSIUM CHLORIDE, SODIUM LACTATE AND CALCIUM CHLORIDE: 600; 310; 30; 20 INJECTION, SOLUTION INTRAVENOUS at 13:40

## 2022-05-06 RX ADMIN — TRANEXAMIC ACID 1000 MG: 1 INJECTION, SOLUTION INTRAVENOUS at 14:21

## 2022-05-06 RX ADMIN — PROPOFOL 200 MG: 10 INJECTION, EMULSION INTRAVENOUS at 14:18

## 2022-05-06 RX ADMIN — FENTANYL CITRATE 50 MCG: 50 INJECTION, SOLUTION INTRAMUSCULAR; INTRAVENOUS at 14:29

## 2022-05-06 RX ADMIN — HYDROMORPHONE HYDROCHLORIDE 1 MG: 2 INJECTION, SOLUTION INTRAMUSCULAR; INTRAVENOUS; SUBCUTANEOUS at 16:46

## 2022-05-06 RX ADMIN — CLINDAMYCIN PHOSPHATE 900 MG: 900 INJECTION, SOLUTION INTRAVENOUS at 14:14

## 2022-05-06 ASSESSMENT — PULMONARY FUNCTION TESTS
PIF_VALUE: 14
PIF_VALUE: 0
PIF_VALUE: 2
PIF_VALUE: 7
PIF_VALUE: 2
PIF_VALUE: 7
PIF_VALUE: 2
PIF_VALUE: 7
PIF_VALUE: 6
PIF_VALUE: 7
PIF_VALUE: 10
PIF_VALUE: 7
PIF_VALUE: 4
PIF_VALUE: 7
PIF_VALUE: 7
PIF_VALUE: 4
PIF_VALUE: 2
PIF_VALUE: 7
PIF_VALUE: 4
PIF_VALUE: 6
PIF_VALUE: 5
PIF_VALUE: 2
PIF_VALUE: 2
PIF_VALUE: 7
PIF_VALUE: 7
PIF_VALUE: 2
PIF_VALUE: 7
PIF_VALUE: 0
PIF_VALUE: 0
PIF_VALUE: 2
PIF_VALUE: 7
PIF_VALUE: 3
PIF_VALUE: 2
PIF_VALUE: 2
PIF_VALUE: 7
PIF_VALUE: 10
PIF_VALUE: 5
PIF_VALUE: 7
PIF_VALUE: 5
PIF_VALUE: 2
PIF_VALUE: 8
PIF_VALUE: 5
PIF_VALUE: 6
PIF_VALUE: 4
PIF_VALUE: 9
PIF_VALUE: 7
PIF_VALUE: 8
PIF_VALUE: 2
PIF_VALUE: 10
PIF_VALUE: 2
PIF_VALUE: 3
PIF_VALUE: 7
PIF_VALUE: 0
PIF_VALUE: 4
PIF_VALUE: 2
PIF_VALUE: 6
PIF_VALUE: 4
PIF_VALUE: 6
PIF_VALUE: 7
PIF_VALUE: 10
PIF_VALUE: 5
PIF_VALUE: 7
PIF_VALUE: 10
PIF_VALUE: 7
PIF_VALUE: 6
PIF_VALUE: 7
PIF_VALUE: 7
PIF_VALUE: 4
PIF_VALUE: 2
PIF_VALUE: 6
PIF_VALUE: 7
PIF_VALUE: 7
PIF_VALUE: 6
PIF_VALUE: 0
PIF_VALUE: 4
PIF_VALUE: 2
PIF_VALUE: 7
PIF_VALUE: 2
PIF_VALUE: 2
PIF_VALUE: 6
PIF_VALUE: 7
PIF_VALUE: 6
PIF_VALUE: 4
PIF_VALUE: 2
PIF_VALUE: 7
PIF_VALUE: 7
PIF_VALUE: 2
PIF_VALUE: 1
PIF_VALUE: 7
PIF_VALUE: 7
PIF_VALUE: 2
PIF_VALUE: 9
PIF_VALUE: 14
PIF_VALUE: 7
PIF_VALUE: 6
PIF_VALUE: 7
PIF_VALUE: 2
PIF_VALUE: 1
PIF_VALUE: 7
PIF_VALUE: 2
PIF_VALUE: 10
PIF_VALUE: 7
PIF_VALUE: 2
PIF_VALUE: 7
PIF_VALUE: 6
PIF_VALUE: 10
PIF_VALUE: 10
PIF_VALUE: 7
PIF_VALUE: 7
PIF_VALUE: 4
PIF_VALUE: 8
PIF_VALUE: 6
PIF_VALUE: 2
PIF_VALUE: 7
PIF_VALUE: 5
PIF_VALUE: 7
PIF_VALUE: 7
PIF_VALUE: 4
PIF_VALUE: 6
PIF_VALUE: 0
PIF_VALUE: 2
PIF_VALUE: 17
PIF_VALUE: 7
PIF_VALUE: 2
PIF_VALUE: 6
PIF_VALUE: 7
PIF_VALUE: 8
PIF_VALUE: 7
PIF_VALUE: 6
PIF_VALUE: 2
PIF_VALUE: 0
PIF_VALUE: 7
PIF_VALUE: 7
PIF_VALUE: 2
PIF_VALUE: 7
PIF_VALUE: 6
PIF_VALUE: 4
PIF_VALUE: 7
PIF_VALUE: 2

## 2022-05-06 ASSESSMENT — PAIN DESCRIPTION - PAIN TYPE: TYPE: SURGICAL PAIN

## 2022-05-06 ASSESSMENT — PAIN DESCRIPTION - LOCATION
LOCATION: KNEE;HIP
LOCATION: KNEE;HIP
LOCATION: HIP

## 2022-05-06 ASSESSMENT — LIFESTYLE VARIABLES: SMOKING_STATUS: 0

## 2022-05-06 ASSESSMENT — PAIN SCALES - GENERAL
PAINLEVEL_OUTOF10: 6
PAINLEVEL_OUTOF10: 7
PAINLEVEL_OUTOF10: 5

## 2022-05-06 ASSESSMENT — PAIN - FUNCTIONAL ASSESSMENT
PAIN_FUNCTIONAL_ASSESSMENT: 0-10
PAIN_FUNCTIONAL_ASSESSMENT: PREVENTS OR INTERFERES SOME ACTIVE ACTIVITIES AND ADLS

## 2022-05-06 ASSESSMENT — PAIN DESCRIPTION - ORIENTATION: ORIENTATION: RIGHT

## 2022-05-06 ASSESSMENT — PAIN DESCRIPTION - DESCRIPTORS
DESCRIPTORS: NUMBNESS;DISCOMFORT
DESCRIPTORS: ACHING;TENDER

## 2022-05-06 NOTE — PROGRESS NOTES
CLINICAL PHARMACY NOTE: MEDS TO BEDS    Total # of Prescriptions Filled: 3   The following medications were delivered to the patient:  · Aspirin 325mg  · Ondansetron 4mg  · Hydrocodone/APAP 10/325mg    Additional Documentation:    Medications were delivered to patient's room and patient's Mom signed Giana signed for    Tala Topete

## 2022-05-06 NOTE — PROGRESS NOTES
Pt arrived from OR to PACU bay 4. Reported received from [address] staff. Pt asleep, arousable to voice. Surgical incisions dressings in place to right knee. Pt arrives with ACE wrap in place, knee brace in place. Pt arrives with simple mask in place infusing 6L oxygen, vitals as charted.

## 2022-05-06 NOTE — ANESTHESIA PRE PROCEDURE
Department of Anesthesiology  Preprocedure Note       Name:  Maureen Wahl   Age:  29 y.o.  :  1985                                          MRN:  [id number]         Date:  2022      Surgeon: Henry Yost):  Jeffery Suarez MD    Procedure: Procedure(s):  RIGHT KNEE EXAM UNDER ANESTHESIA WITH ARTHROSCOPIC PARTIAL MEDIAL MENISECTOMY VERSUS REPAIR (77598, [id number] VERSUS [id number]  POSSIBLE ANTERIOR CRUCIATE LIGAMENT REVISION BONE TENDON BONE AUTOGRAFT    Medications prior to admission:   Prior to Admission medications    Medication Sig Start Date End Date Taking? Authorizing Provider   HYDROcodone-acetaminophen (NORCO)  MG per tablet Take 1 tablet by mouth every 4 hours as needed for Pain for up to 5 days. 22  Jeffery Suarez MD   aspirin 325 MG EC tablet Take 1 tablet by mouth daily for 14 days 22  Jeffery Suarez MD   ondansetron [facility]) 4 MG tablet Take 1 tablet by mouth every 8 hours as needed for Nausea 22   Jeffery Suarez MD   L-Methylfolate-Algae (DEPLIN 15) 59-19.055 MG CAPS Take 1 capsule by mouth daily 3/17/22   CHARLEE Ruiz CNP   escitalopram (LEXAPRO) 20 MG tablet Take 1 tablet by mouth daily 3/17/22   CHARLEE Ruiz CNP   pantoprazole (PROTONIX) 40 MG tablet TAKE 1 TABLET BY MOUTH EVERY DAY 1/3/22   Historical Provider, MD   SUMAtriptan (IMITREX) 25 MG tablet Take 1 tablet by mouth once as needed for Migraine May repeat in 2 hours if not improving- max daily 200mg.  10/27/21 3/17/22  Claudia Tovar MD       Current medications:    Current Facility-Administered Medications   Medication Dose Route Frequency Provider Last Rate Last Admin    lidocaine PF 1 % injection 0.5 mL  0.5 mL IntraDERmal Once Jeffery Suarez MD        lactated ringers infusion   IntraVENous Continuous Jeffery Suarez MD        tranexamic acid (CYKLOKAPRON) 1,000 mg in sodium chloride 0.9 % 50 mL IVPB  1,000 mg IntraVENous Once Jeffery Suarez MD  clindamycin (CLEOCIN) 600 mg in dextrose 5 % 50 mL IVPB  600 mg IntraVENous Once Triny Cervantes MD        ortho mix injection   Injection On Call Triny Cervantes MD        aprepitant (EMEND) capsule 40 mg  40 mg Oral Once Olivia Haley MD           Allergies: Allergies   Allergen Reactions    Keflex [Cephalexin] Rash       Problem List:    Patient Active Problem List   Diagnosis Code    Weight loss R63.4    Dysphagia R13.10    History of hepatitis C Z86.19    Adjustment disorder with anxious mood F43.22       Past Medical History:        Diagnosis Date    Migraine     Ureteral reflux        Past Surgical History:        Procedure Laterality Date    ANTERIOR CRUCIATE LIGAMENT REPAIR Right     BLADDER SURGERY      for reflux    MENISCECTOMY Right     URETER STENT PLACEMENT         Social History:    Social History     Tobacco Use    Smoking status: Former Smoker     Packs/day: 0.50     Types: Cigarettes     Quit date: 2019     Years since quittin.6    Smokeless tobacco: Current User    Tobacco comment: Vaps   Substance Use Topics    Alcohol use: Yes     Comment: social                                Ready to quit: Not Answered  Counseling given: Not Answered  Comment: Vaps      Vital Signs (Current):   Vitals:    22 0918   Weight: 138 lb (62.6 kg)   Height: 5' 5\" (1.651 m)                                              BP Readings from Last 3 Encounters:   22 110/70   22 (!) 110/58   22 115/75       NPO Status:                                                                                 BMI:   Wt Readings from Last 3 Encounters:   22 138 lb (62.6 kg)   22 140 lb (63.5 kg)   22 140 lb (63.5 kg)     Body mass index is 22.96 kg/m².     CBC:   Lab Results   Component Value Date    WBC 7.6 2021    RBC 5.32 2021    HGB 14.7 2021    HCT 42.9 2021    MCV 80.7 2021    RDW 12.7 2021     2021 CMP:   Lab Results   Component Value Date     09/30/2021    K 3.6 09/30/2021     09/30/2021    CO2 21 09/30/2021    BUN 13 09/30/2021    CREATININE <0.5 09/30/2021    GFRAA >60 09/30/2021    AGRATIO 1.1 09/30/2021    LABGLOM >60 09/30/2021    GLUCOSE 110 09/30/2021    PROT 7.6 09/30/2021    CALCIUM 10.5 09/30/2021    BILITOT 2.0 09/30/2021    ALKPHOS 75 09/30/2021    AST 20 09/30/2021    ALT 16 09/30/2021       POC Tests: No results for input(s): POCGLU, POCNA, POCK, POCCL, POCBUN, POCHEMO, POCHCT in the last 72 hours.     Coags: No results found for: PROTIME, INR, APTT    HCG (If Applicable):   Lab Results   Component Value Date    PREGTESTUR Negative 09/30/2021        ABGs: No results found for: PHART, PO2ART, IUZ2UGS, WTP9PGC, BEART, A4JEOROK     Type & Screen (If Applicable):  No results found for: LABABO, LABRH    Drug/Infectious Status (If Applicable):  No results found for: HIV, HEPCAB    COVID-19 Screening (If Applicable):   Lab Results   Component Value Date    COVID19 Not Detected 09/30/2021           Anesthesia Evaluation  Patient summary reviewed and Nursing notes reviewed no history of anesthetic complications:   Airway: Mallampati: II  TM distance: >3 FB   Neck ROM: full  Mouth opening: > = 3 FB Dental: normal exam         Pulmonary:Negative Pulmonary ROS and normal exam  breath sounds clear to auscultation      (-) COPD, asthma, sleep apnea and not a current smoker                           Cardiovascular:Negative CV ROS        (-) hypertension, past MI, CAD, CABG/stent, dysrhythmias,  angina and  CHF      Rhythm: regular  Rate: normal                    Neuro/Psych:   (+) headaches: migraine headaches, depression/anxiety    (-) seizures, TIA and CVA           GI/Hepatic/Renal:   (+) GERD: well controlled, hepatitis (treated, cleared, no cirrhosis): C,      (-) no renal disease       Endo/Other: Negative Endo/Other ROS       (-) diabetes mellitus, hypothyroidism, hyperthyroidism               Abdominal:             Vascular: Other Findings:           Anesthesia Plan      general     ASA 2       Induction: intravenous. MIPS: Postoperative opioids intended and Prophylactic antiemetics administered. Anesthetic plan and risks discussed with patient. Plan discussed with CRNA.                   Shashank Adhikari MD   5/6/2022

## 2022-05-06 NOTE — PROGRESS NOTES
Pt awake and alert. Pt on RA, VSS. Mother at the bedside. Pt with c/o pain, denies nausea, tolerating PO. Skin warm RLE, palpable pulses and able to wiggle toes. Pt meets criteria to be discharged from phase 1.

## 2022-05-06 NOTE — PROGRESS NOTES
Discharge instructions reviewed with patient/mother. All home medications have been reviewed, questions answered and patient verbalized understanding. Discharge instructions signed. Pt dc'd per wheelchair. Patient discharged home with knee immobilizer, three medications and other belongings. Pt already has crutches. Mother taking stable pt home.

## 2022-05-06 NOTE — ANESTHESIA POSTPROCEDURE EVALUATION
Department of Anesthesiology  Postprocedure Note    Patient: Rocky Lizarraga  MRN: [id number]  YOB: 1985  Date of evaluation: 5/6/2022  Time:  5:09 PM     Procedure Summary     Date: 05/06/22 Room / Location: [address]    Anesthesia Start: 2250 Anesthesia Stop: 7746    Procedure: RIGHT KNEE EXAM UNDER ANESTHESIA WITH ARTHROSCOPIC MEDIAL MENISCUS REPAIR (Right Knee) Diagnosis: (S83.211A BUCKET HANDLE TEAR RIGHT MEDIAL MENISCUS)    Surgeons: Shaji Fuentes MD Responsible Provider: Meño Da Silva MD    Anesthesia Type: general ASA Status: 2          Anesthesia Type: general    Josie Phase I: Josie Score: 8    Josie Phase II:      Last vitals: Reviewed and per EMR flowsheets.        Anesthesia Post Evaluation    Patient location during evaluation: PACU  Patient participation: complete - patient participated  Level of consciousness: awake and alert  Airway patency: patent  Nausea & Vomiting: no vomiting and no nausea  Complications: no  Cardiovascular status: hemodynamically stable  Respiratory status: acceptable  Hydration status: stable

## 2022-05-06 NOTE — H&P
History of Present Illness: The patient is a 78-year-old female who presents for evaluation. She reports that she injured her self on 4/10/2022. At that time she was moving in her bed when she twisted her knee. She felt a popping sensation followed by pain. Ever since then she reports locking of the knee. She has limited extension and flexion. She reports difficulty bearing weight. She has been using crutches.     Of note, the patient did undergo a right knee ACL reconstruction with hamstring autograft and medial meniscus repair in May 2021 by Dr. Brennan Mayo at [facility]. Report that she did well following that surgery up until this recent event.     Objective:  Ht 5' 5\" (1.651 m)   Wt 140 lb (63.5 kg)   BMI 23.30 kg/m²       Physical Exam:  The patient is well-appearing and in no apparent distress  CV-RRR  Pulm-CTAB    Examination of the right knee   There is a + effusion, no gross deformity or skin changes  Difficult to assess ligamentous stability given patient's pain and guarding  She does have medial and lateral joint line tenderness  5 out of 5 strength throughout distal muscle groups  Sensation is intact to light touch throughout all distributions  There is no calf swelling or tenderness  Palpable DP pulse, brisk cap refill, 2+ symmetric reflexes     Imagin view x-rays of the right knee were obtained the office today on 2022. There is no fracture or dislocation. Postop changes consistent with ACL reconstruction. Metallic button along the lateral cortex of the distal femur from ACL reconstruction. MRI of the right knee was reviewed. There is a bucket-handle tear of the medial meniscus present. The ACL graft does appear to be intact however it does appear to be a vertical graft        Assessment:  Right knee injury sustained on 4/10/2022 following ACL reconstruction with hamstring autograft and medial meniscus repair in May 2021.   MRI reveals bucket-handle tear of the medial meniscus     Plan:  I had a very long discussion with the patient. We spent time reviewing the imaging findings. She has a very complex constellation of findings. She does have a displaced bucket-handle tear of the medial meniscus. As a result of the displacement and nature of the injury this is a surgical condition. I would recommend a right knee arthroscopy with possible partial meniscectomy versus repair. She has had a meniscus repair in the past however it is unclear the exact circumstances surrounding the repair and how many sutures were placed. Regardless I do think it is possible for us to perform a revision repair if the tissue quality is overall good and appears to be amenable to it. However that would be an intraoperative decision. If the tissue quality is poor that we would have no choice but to perform partial meniscectomy. I did tell the patient that if repair is performed there is always a chance it would not heal and she would require additional surgery. Alternatively if partial meniscectomy was performed she could have pain as result of meniscal deficiency and be at risk for degeneration of that compartment in the future. She is aware of this. Lastly, if we perform a repair of the bucket-handle tear I would also assess the stability of her ACL with a Lachman and pivot shift test.  If her ACL appears to be incompetent then I would advocate for revision ACL reconstruction in order to protect the meniscus repair and ensure restoration of normal knee kinematics. As result of that I would recommend BTB autograft. I do suspect that the chances of us needing to revise the ACL low however it is possible that that would be required. We thoroughly discussed the risks of surgery. They were defined as but not limited to infection, bleeding, damage blood vessels or nerves, need for additional surgery. She does understand elects proceed.

## 2022-05-08 ENCOUNTER — HOSPITAL ENCOUNTER (EMERGENCY)
Age: 37
Discharge: HOME | End: 2022-05-08
Payer: COMMERCIAL

## 2022-05-08 VITALS
HEIGHT: 65 IN | BODY MASS INDEX: 23.32 KG/M2 | SYSTOLIC BLOOD PRESSURE: 124 MMHG | DIASTOLIC BLOOD PRESSURE: 76 MMHG | OXYGEN SATURATION: 96 % | HEART RATE: 66 BPM | TEMPERATURE: 97.8 F | WEIGHT: 140 LBS | RESPIRATION RATE: 16 BRPM

## 2022-05-08 DIAGNOSIS — Z09 POSTOP CHECK: Primary | ICD-10-CM

## 2022-05-08 PROCEDURE — 99282 EMERGENCY DEPT VISIT SF MDM: CPT

## 2022-05-08 ASSESSMENT — ENCOUNTER SYMPTOMS
ABDOMINAL PAIN: 0
NAUSEA: 0
VOMITING: 0
SHORTNESS OF BREATH: 0
DIARRHEA: 0
CHEST TIGHTNESS: 0

## 2022-05-08 ASSESSMENT — PAIN DESCRIPTION - LOCATION: LOCATION: KNEE

## 2022-05-08 ASSESSMENT — PAIN DESCRIPTION - DESCRIPTORS: DESCRIPTORS: ACHING

## 2022-05-08 ASSESSMENT — PAIN DESCRIPTION - ORIENTATION: ORIENTATION: RIGHT

## 2022-05-08 ASSESSMENT — PAIN SCALES - GENERAL: PAINLEVEL_OUTOF10: 2

## 2022-05-08 ASSESSMENT — LIFESTYLE VARIABLES
HOW OFTEN DO YOU HAVE A DRINK CONTAINING ALCOHOL: MONTHLY OR LESS
HOW MANY STANDARD DRINKS CONTAINING ALCOHOL DO YOU HAVE ON A TYPICAL DAY: 1 OR 2

## 2022-05-08 ASSESSMENT — PAIN - FUNCTIONAL ASSESSMENT: PAIN_FUNCTIONAL_ASSESSMENT: 0-10

## 2022-05-09 NOTE — TELEPHONE ENCOUNTER
PARTIAL APPROVAL  [id number]  APPROVED  [id number]  [facility]  EMI  [id number] DENIED  PER [place], IF [id number] WAS DONE, SUBMIT POST SERVICE CLAIMS TO REVIEW

## 2022-05-09 NOTE — ED PROVIDER NOTES
[address] [facility]        Pt Name: Romana Haque  MRN: [id number]  [place] 1985  Date of evaluation: 5/8/2022  Provider: CHARLEE Oneil CNP  PCP: CHARLEE Walker CNP  Note Started: 10:59 PM EDT       ZAKI. I have evaluated this patient. My supervising physician was available for consultation. CHIEF COMPLAINT       Chief Complaint   Patient presents with    Other     Pt needs her brace adjusted she took it off and is unsure of how it needs to be placed back on. Meniscus revision was done on Friday and Pt was suppose to not shower till tomorrow but took it off today for bird bath and doesn't know how to properly adjust and is unsure of D/C instructions given. HISTORY OF PRESENT ILLNESS   (Location, Timing/Onset, Context/Setting, Quality, Duration, Modifying Factors, Severity, Associated Signs and Symptoms)  Note limiting factors. Chief Complaint: splint application     Romana Haque is a 29 y.o. female who presents to the emergency department to have her right knee brace reapplied. The patient reports that she took it off to dangle her leg as directed per orthopedics. Reports that she is unable to get the knee brace reapplied appropriately. Patient's injury did occur on 4/10/2022, she was moving her bed when she twisted her knee, she felt a popping sensation followed by pain. She has had locking of the knee since with limited extension and flexion. Patient did undergo a right knee ACL reconstruction with hamstring autograft and medial meniscus repair in May 2021 by Dr. Margret Junior at [facility]. She had recent surgery 2 days ago by dr. Jin Gerard for ARTHROSCOPIC PARTIAL MEDIAL MENISECTOMY VERSUS REPAIR. Patient is unable to replace brace. Denies injury, no pain out of proportion. Denies any headache, fever, lightheadedness, dizziness, visual disturbances. No chest pain or pressure. No neck or back pain. No shortness of breath, cough, or congestion. No abdominal pain, nausea, vomiting, diarrhea, constipation, or dysuria. No rash. Nursing Notes were all reviewed and agreed with or any disagreements were addressed in the HPI. REVIEW OF SYSTEMS    (2-9 systems for level 4, 10 or more for level 5)     Review of Systems   Constitutional: Negative for activity change, chills and fever. Respiratory: Negative for chest tightness and shortness of breath. Cardiovascular: Negative for chest pain. Gastrointestinal: Negative for abdominal pain, diarrhea, nausea and vomiting. Genitourinary: Negative for dysuria. All other systems reviewed and are negative. Positives and Pertinent negatives as per HPI. Except as noted above in the ROS, all other systems were reviewed and negative. PAST MEDICAL HISTORY     Past Medical History:   Diagnosis Date    Migraine     Thyroid disease     Ureteral reflux          SURGICAL HISTORY     Past Surgical History:   Procedure Laterality Date    ANTERIOR CRUCIATE LIGAMENT REPAIR Right     BLADDER SURGERY      for reflux    MENISCECTOMY Right     URETER STENT PLACEMENT           CURRENTMEDICATIONS       Discharge Medication List as of 5/8/2022 11:48 PM      CONTINUE these medications which have NOT CHANGED    Details   HYDROcodone-acetaminophen (NORCO)  MG per tablet Take 1 tablet by mouth every 4 hours as needed for Pain for up to 5 days. , Disp-30 tablet, R-0Normal      aspirin 325 MG EC tablet Take 1 tablet by mouth daily for 14 days, Disp-14 tablet, R-0Normal      ondansetron (ZOFRAN) 4 MG tablet Take 1 tablet by mouth every 8 hours as needed for Nausea, Disp-21 tablet, R-0Normal      L-Methylfolate-Algae (DEPLIN 15) 15-90.314 MG CAPS Take 1 capsule by mouth daily, Disp-30 capsule, R-2Normal      escitalopram (LEXAPRO) 20 MG tablet Take 1 tablet by mouth daily, Disp-30 tablet, R-2Normal      pantoprazole (PROTONIX) 40 MG tablet TAKE 1 TABLET BY MOUTH EVERY DAYHistorical Med      SUMAtriptan (IMITREX) 25 MG tablet Take 1 tablet by mouth once as needed for Migraine May repeat in 2 hours if not improving- max daily 200mg., Disp-20 tablet, R-2Normal               ALLERGIES     Keflex [cephalexin]    FAMILYHISTORY       Family History   Problem Relation Age of Onset    Heart Disease Mother     No Known Problems Father     No Known Problems Sister     No Known Problems Brother     No Known Problems Maternal Grandmother     No Known Problems Maternal Grandfather     No Known Problems Paternal Grandmother     No Known Problems Paternal Grandfather     No Known Problems Daughter     Heart Defect Daughter     Heart Defect Daughter           SOCIAL HISTORY       Social History     Tobacco Use    Smoking status: Former Smoker     Packs/day: 0.50     Types: Cigarettes     Quit date: 2019     Years since quittin.6    Smokeless tobacco: Current User    Tobacco comment: Vaps   Vaping Use    Vaping Use: Every day    Substances: Nicotine   Substance Use Topics    Alcohol use: Yes     Comment: social    Drug use: Yes     Frequency: 2.0 times per week     Types: Marijuana (Weed)     Comment: smoked last night       SCREENINGS    [place] Coma Scale  Eye Opening: Spontaneous  Best Verbal Response: Oriented  Best Motor Response: Obeys commands  [place] Coma Scale Score: 15        PHYSICAL EXAM    (up to 7 for level 4, 8 or more for level 5)     ED Triage Vitals   BP Temp Temp Source Pulse Resp SpO2 Height Weight   22 2149 22 2149 22 2149 22 2149 22 2149 22 2149 22 2149 22 2143   124/76 97.8 °F (36.6 °C) Oral 66 16 96 % 5' 5\" (1.651 m) 140 lb (63.5 kg)       Physical Exam  Vitals and nursing note reviewed. Constitutional:       Appearance: She is well-developed. She is not diaphoretic. HENT:      Head: Normocephalic and atraumatic.       Right Ear: External ear normal.      Left Ear: External ear normal.   Eyes:      General:         Right eye: No discharge. Left eye: No discharge. Neck:      Vascular: No JVD. Cardiovascular:      Rate and Rhythm: Normal rate and regular rhythm. Heart sounds: Normal heart sounds. Pulmonary:      Effort: Pulmonary effort is normal. No respiratory distress. Breath sounds: Normal breath sounds. Abdominal:      Palpations: Abdomen is soft. Musculoskeletal:         General: Normal range of motion. Cervical back: Normal range of motion and neck supple. Skin:     General: Skin is warm and dry. Coloration: Skin is not pale. Comments: Surgical dressing is intact. The patient has no complaints to surgical site. This was not removed. Neurological:      Mental Status: She is alert and oriented to person, place, and time. Psychiatric:         Behavior: Behavior normal.         DIAGNOSTIC RESULTS   LABS:    Labs Reviewed - No data to display    When ordered only abnormal lab results are displayed. All other labs were within normal range or not returned as of this dictation. EKG: When ordered, EKG's are interpreted by the Emergency Department Physician in the absence of a cardiologist.  Please see their note for interpretation of EKG. RADIOLOGY:   Non-plain film images such as CT, Ultrasound and MRI are read by the radiologist. Plain radiographic images are visualized and preliminarily interpreted by the ED Provider with the below findings:        Interpretation per the Radiologist below, if available at the time of this note:    No orders to display     CT KNEE RIGHT WO CONTRAST    Result Date: 5/4/2022  EXAMINATION: CT OF THE RIGHT KNEE WITHOUT CONTRAST 5/4/2022 11:32 am TECHNIQUE: CT of the right knee was performed without the administration of intravenous contrast.  Multiplanar reformatted images are provided for review.  Dose modulation, iterative reconstruction, and/or weight based adjustment of the mA/kV was utilized to reduce the radiation dose to as low as reasonably achievable. COMPARISON: Right knee plain radiographs from 04/14/2022 HISTORY ORDERING SYSTEM PROVIDED HISTORY: Right knee pain, unspecified chronicity TECHNOLOGIST PROVIDED HISTORY: Is the patient pregnant?->No Reason for Exam: Right knee pain Additional signs and symptoms: surgery for meniscus 60-year-old female with right knee pain, unspecified chronicity FINDINGS: Bones: Evidence of prior ACL reconstruction with distal femoral and proximal tibial tunnels. Osseous alignment is normal. No acute fracture or dislocation. Soft Tissue: ACL reconstruction graft appears continuous by CT. Joint: Small suprapatellar joint effusion. Joint spaces are relatively well maintained. 1. Evidence of prior ACL reconstruction. Small suprapatellar joint effusion. ACL reconstruction graft appears continuous by CT. 2. No acute osseous abnormality. MRI KNEE RIGHT WO CONTRAST    Result Date: 5/2/2022  EXAMINATION: MRI OF THE RIGHT KNEE WITHOUT CONTRAST, 5/2/2022 9:24 am TECHNIQUE: Multiplanar multisequence MRI of the right knee was performed without the administration of intravenous contrast. COMPARISON: Right knee plain radiographs from 4/14/2022 HISTORY: ORDERING SYSTEM PROVIDED HISTORY: Right knee pain, unspecified chronicity TECHNOLOGIST PROVIDED HISTORY: Reason for exam:->evaluate possible displaced bucket handle tear meniscus Is the patient pregnant?->No Reason for Exam: Right Knee Pain Relevant Medical/Surgical History: Previous ACL surgery 60-year-old female with right knee pain; evaluate for possible bucket-handle tear of the medial meniscus; prior ACL surgery FINDINGS: MENISCI: Bucket-handle tear of the medial meniscus with double PCL sign as seen on image 9, series 6 and image 13, series 9. Lateral meniscus demonstrates normal morphology and signal characteristics.  CRUCIATE LIGAMENTS: Evidence of prior ACL reconstruction with continuous/intact graft material of the ACL. Posterior cruciate ligament appears intact. EXTENSOR MECHANISM: Distal quadriceps tendon, patellar tendon, and patellar retinacula appear intact. LATERAL COLLATERAL LIGAMENT COMPLEX: Popliteus muscle/tendon, iliotibial band, lateral collateral ligament, and biceps femoris appear intact. MEDIAL COLLATERAL LIGAMENT COMPLEX: Medial collateral ligament complex appears intact. KNEE JOINT: Small joint effusion. Osseous alignment is normal.  No acute fracture or dislocation. Evidence of prior ACL graft reconstruction. Grade 2 medial compartment chondromalacia. Articular cartilage of the lateral compartment appears grossly intact without focal chondral defect. Grade 2 patellofemoral chondromalacia. BONE MARROW: Distal femoral and proximal tibial tunnels consistent with prior ACL reconstruction graft. Bone marrow signal intensity within the visualized osseous structures otherwise within normal limits. SOFT TISSUES: Small Baker's cyst.  Mild edema in the subcutaneous fat along the anterior knee. Visualized popliteal neurovascular bundle grossly unremarkable. 1. Bucket-handle tear of the medial meniscus. 2. Small joint effusion. 3. Evidence of prior ACL graft reconstruction. ACL reconstruction graft appears continuous/intact. 4. Mild medial and patellofemoral chondromalacia. 5. Small joint effusion. Small Baker's cyst.  Mild edema in the subcutaneous fat along the anterior knee.            PROCEDURES   Unless otherwise noted below, none     Procedures    CRITICAL CARE TIME       CONSULTS:  None      EMERGENCY DEPARTMENT COURSE and DIFFERENTIAL DIAGNOSIS/MDM:   Vitals:    Vitals:    05/08/22 2143 05/08/22 2149   BP:  124/76   Pulse:  66   Resp:  16   Temp:  97.8 °F (36.6 °C)   TempSrc:  Oral   SpO2:  96%   Weight: 140 lb (63.5 kg) 140 lb (63.5 kg)   Height:  5' 5\" (1.651 m)       Patient was given the following medications:  Medications - No data to display        Briefly, this is a 81-year-old female that presents tonight to have assistance to replace her surgical brace. I did call the nurses on 4 tow for assistance as they are familiar with these braces as myself and another ER provider was unable to replace the brace appropriately. Therefore to our nurse did speak with orthopedics and put a knee immobilizer on this patient with crutches. The patient will follow-up with orthopedics today. FINAL IMPRESSION      1.  Postop check          DISPOSITION/PLAN   DISPOSITION Decision To Discharge 05/08/2022 11:46:56 PM      PATIENT REFERRED TO:  Mayte Dozier MD  [address] [place] [address]  [phone]    Schedule an appointment as soon as possible for a visit         DISCHARGE MEDICATIONS:  Discharge Medication List as of 5/8/2022 11:48 PM          DISCONTINUED MEDICATIONS:  Discharge Medication List as of 5/8/2022 11:48 PM                 (Please note that portions of this note were completed with a voice recognition program.  Efforts were made to edit the dictations but occasionally words are mis-transcribed.)    CHARLEE Oneil CNP (electronically signed)           CHARLEE Oneil CNP  05/09/22 [id number]

## 2022-05-09 NOTE — OP NOTE
Operative Note      Patient: Brijesh Granados  YOB: 1985  MRN: [id number]    Date of Procedure: 5/6/2022    Pre-Op Diagnosis: S83.211A BUCKET HANDLE TEAR RIGHT MEDIAL MENISCUS    Post-Op Diagnosis: Same       Procedure(s):  RIGHT KNEE EXAM UNDER ANESTHESIA WITH ARTHROSCOPIC MEDIAL MENISCUS REPAIR    Surgeon(s):  Natalya Figueroa MD    Assistant:   Surgical Assistant: Adriana Gil    Anesthesia: General    Estimated Blood Loss (mL): Minimal    Complications: None    Specimens:   * No specimens in log *    Implants:  Implant Name Type Inv. Item Serial No.  Lot No. LRB No. Used Action   NEEDLE SUT CRV FOR DEL MENISCI REP SYS FAST- - [id number]  NEEDLE SUT CRV FOR DEL MENISCI REP SYS FAST-  [employer]WD [id number] Right 2 Implanted   NEEDLE SUT CRV FOR DEL MENISCI REP SYS FAST- - [id number]  NEEDLE SUT CRV FOR DEL MENISCI REP SYS FAST-  [employer] [id number] Right 1 Implanted         Drains: * No LDAs found *    Findings: per dictation    Detailed Description of Procedure:    The patient is a 45-year-old female who presents for evaluation.  She reports that she injured herself on 4/10/2022. [place] that time she was moving in her bed when she twisted her knee.  She felt a popping sensation followed by pain.  Ever since then she reports locking of the knee.  She has limited extension and flexion.  She reports difficulty bearing weight. Chacha Horne has been using crutches.     Of note, the patient did undergo a right knee ACL reconstruction with hamstring autograft and medial meniscus repair in May 2021 by Dr. Elissa Narayan at [address] [place] [address] that she did well following that surgery up until this recent event.     I had a very long discussion with the patient.  We spent time reviewing the imaging findings.  She has a very complex constellation of findings.  She does have a displaced bucket-handle tear of the medial meniscus.  As a result of the displacement and nature of the injury this is a surgical condition.  I would recommend a right knee arthroscopy with possible partial meniscectomy versus repair. Zacarias Huff has had a meniscus repair in the past however it is unclear the exact circumstances surrounding the repair and how many sutures were placed.  Regardless I do think it is possible for us to perform a revision repair if the tissue quality is overall good and appears to be amenable to it.  However that would be an intraoperative decision.  If the tissue quality is poor that we would have no choice but to perform partial meniscectomy.  I did tell the patient that if repair is performed there is always a chance it would not heal and she would require additional surgery.  Alternatively if partial meniscectomy was performed she could have pain as result of meniscal deficiency and be at risk for degeneration of that compartment in the future.  She is aware of this.  Lastly, if we perform a repair of the bucket-handle tear I would also assess the stability of her ACL with a Lachman and pivot shift test.  If her ACL appears to be incompetent then I would advocate for revision ACL reconstruction in order to protect the meniscus repair and ensure restoration of normal knee kinematics.  As result of that I would recommend BTB autograft.  I do suspect that the chances of us needing to revise the ACL low however it is possible that that would be required.  We thoroughly discussed the risks of surgery.  They were defined as but not limited to infection, bleeding, damage blood vessels or nerves, need for additional surgery.  She does understand elects proceed.     The patient was met in previous holding area.  The surgical site was identified marked.  Informed consent was obtained.  she was taken back to the operative room and placed on table in supine position.  General anesthesia was performed.  Examination under anesthesia demonstrated no laxity with Lachman, negative pivot shift examination, no laxity with varus or valgus stress at 0 degrees or 30 degrees of flexion, negative dial test, stable posterior drawer.  Thigh tourniquet was placed.  The lower extremity was prepped draped using sterile technique.  A leg vargas was utilized and the table was lowered.  Contralateral extremity was well-padded and an SCD was placed.  At that point a formal timeout was performed which correct patient, surgical site, and procedure was reaffirmed. .  With antibiotics were given within 30 minutes of skin incision.  At that point lower extremity was exsanguinated and a tourniquet was insufflated to 250 mg of mercury.  Anterior inferior lateral incision was made.  Arthroscope was introduced.  Diagnostic arthroscopy was performed.  Patellofemoral joint exhibited no evidence of chondral abnormalities.  Medial and lateral gutters exhibited no loose bodies.  Medial compartment is entered.  Anterior inferior medial incision was made.  Resection of the infrapatellar fat pad was performed for visualization purposes.  The medial compartment is entered.    There is a bucket-handle tear of the medial meniscus.  This extended from the root attachment towards the anterior body. The tear involved the middle third of the meniscus. The shaver and rasp were used to prepare the tissue for repair. The meniscus was then reduced in an anatomic position.  Overall the meniscus tissue quality appeared to be fair to good. As result I did elect to proceed with repair.   We proceeded with inside-out repair for the body.  Medial based incision was made.  Sartorius fascia was incised.  We came down the capsule.  We performed inside-out repair using meniscus needles.  We placed a total of 10 vertical mattress sutures on the femoral side of the meniscus and tibial side of the meniscus.  Posteriorly at the posterior horn we did utilize a total of 4 all inside devices.  These were the [employer] FasT-Fix devices. We placed these in a vertical mattress fashion on the femoral side. We then shifted our focus to remainder of the knee.  The ACL and PCL was intact. There was no chondral abnormality of the lateral compartment.      Tourniquet was deflated.  Incisions were irrigated and Hemostasis was achieved.  100 mL of Ortho mix was injected into the soft tissues.  Fascial layer was closed using #1 Vicryl over the tibial tunnel.  Subtenons layer was closed using 2-0 Vicryl in buried fashion.  Skin was closed using 3-0 nylon in both a running and interrupted fashion.  Sterile dressings were provided in form of Xeroform, 4 x 4's, ABD, Ace.  He was placed in a hinged knee brace.  Successfully woken from anesthesia and taken recovery room in excellent condition. Raphael Lo was present for all aspects of the case.  Postoperative plan.  The patient will be touchdown weight bearing.  Start physical therapy immediately.  Return to the office in 2 weeks.  Norco for pain control.  Aspirin for DVT prophylaxis.     Of note, since this was an inside out bucket handle repair requiring an additional incision it was much more technically demanding than a standard meniscus repair requiring additional 50 to 100 percent of operative time.       Electronically signed by Hasmukh Dye MD on 5/9/2022 at 12:29 PM

## 2022-05-11 ENCOUNTER — HOSPITAL ENCOUNTER (OUTPATIENT)
Dept: PHYSICAL THERAPY | Age: 37
Setting detail: THERAPIES SERIES
Discharge: HOME | End: 2022-05-11
Payer: COMMERCIAL

## 2022-05-11 PROCEDURE — 97110 THERAPEUTIC EXERCISES: CPT | Performed by: PHYSICAL THERAPIST

## 2022-05-11 PROCEDURE — 97161 PT EVAL LOW COMPLEX 20 MIN: CPT | Performed by: PHYSICAL THERAPIST

## 2022-05-11 PROCEDURE — 97016 VASOPNEUMATIC DEVICE THERAPY: CPT | Performed by: PHYSICAL THERAPIST

## 2022-05-11 PROCEDURE — 97112 NEUROMUSCULAR REEDUCATION: CPT | Performed by: PHYSICAL THERAPIST

## 2022-05-11 NOTE — FLOWSHEET NOTE
[place], [address] [facility], [address]  Phone: [phone]   Fax: [phone]    Physical Therapy Treatment Note/ Progress Report:           Date:  2022    Patient Name:  Damien Boyce    :  1985  MRN: [id number]  Restrictions/Precautions:  50% WB Brace locked in full extension for WB 0-2 weeks, 0-90 ROM in NWB  Medical/Treatment Diagnosis Information:  · Diagnosis: S83.211A (ICD-10-CM) - Bucket-handle tear of medial meniscus of right knee as current injury, initial encounter  · Treatment Diagnosis: 83.211D (ICD-10-CM) - Bucket-handle tear of medial meniscus of right knee as current injury, R knee pain M25.561, R knee effusion H85.393  Insurance/Certification information:  PT Insurance Information: [address] 30 visit / [place] after 30       Physician Information:   Angy Russo  Has the plan of care been signed (Y/N):        []  Yes  [x]  No     Date of Patient follow up with Physician: 22      Is this a Progress Report:     []  Yes  [x]  No        If Yes:  Date Range for reporting period:  Beginnin22  Endin/10/22    Progress report will be due (10 Rx or 30 days whichever is less):        Recertification will be due (POC Duration  / 90 days whichever is less): 12 weeks      Visit # Insurance Allowable Auth Required   In-person 1 30 visit / [place] after 30      []  Yes []  No    Telehealth   []  Yes []  No    Total          Functional Scale: FOTO: 43/100    Date assessed:  22      Therapy Diagnosis/Practice Pattern:4I      Number of Comorbidities:  []0     [x]1-2    []3    Latex Allergy:  [x]NO      []YES  Preferred Language for Healthcare:   [x]English       []other:      Pain level:  0-2/10     SUBJECTIVE:  See eval    OBJECTIVE: See eval   Observation:    Test measurements:  85-90 knee flexion. -5 knee ext    RESTRICTIONS/PRECAUTIONS: 50% WB Brace locked in full extension for WB 0-2 weeks, 0-90 ROM in NWB. See protocol. Previous ACL/ MMR  May 2021 from Dr. Sami Stephens.  Exercises/Interventions:     Therapeutic Ex (64762) Sets/sec Reps Notes/CUES   QS/AP H10 10 HEP   SLR H5 10 HEP   GS with strap h30 5 HEP   HS stretches in sitting H30 5 HEP   Heel slides to 90 only H10 10 Instructed to do in brace at home   Knee ext propping N.V                                          Manual Intervention (74292)            Patellar mobilizations 5'                             NMR re-education (01302)   CUES NEEDED   Patient education on HEP, gait training 50% WB, use of ice 10'                                                     Therapeutic Activity (78699)            Gait training                                HEP instruction: (see scanned forms)  Access Code: [email or web address]  URL: [email or web address]  Date: 05/11/2022  Prepared by: Victoriano Cane     Exercises  Long Sitting Ankle Pumps - 5-7 x daily - 7 x weekly - 3 sets - 10 reps - 1-2 hold  Long Sitting Quad Set - 5-7 x daily - 7 x weekly - 1 sets - 10 reps - 10 hold  Long Sitting Calf Stretch with Strap - 2 x daily - 7 x weekly - 1 sets - 5 reps - 30\" hold  Seated Table Hamstring Stretch - 2 x daily - 7 x weekly - 1 sets - 5 reps - 30\" hold  Supine Straight Leg Raises - 2 x daily - 7 x weekly - 2 sets - 10 reps  Sitting Heel Slide with Towel - 2 x daily - 7 x weekly - 1 sets - 10 reps - 10\" hold     Therapeutic Exercise and NMR EXR  [x] (91385) Provided verbal/tactile cueing for activities related to strengthening, flexibility, endurance, ROM for improvements in LE, proximal hip, and core control with self care, mobility, lifting, ambulation.  [] (83971) Provided verbal/tactile cueing for activities related to improving balance, coordination, kinesthetic sense, posture, motor skill, proprioception  to assist with LE, proximal hip, and core control in self care, mobility, lifting, ambulation and eccentric single leg control. NMR and Therapeutic Activities:    [x] (77915 or 34013) Provided verbal/tactile cueing for activities related to improving balance, coordination, kinesthetic sense, posture, motor skill, proprioception and motor activation to allow for proper function of core, proximal hip and LE with self care and ADLs  [] (62376) Gait Re-education- Provided training and instruction to the patient for proper LE, core and proximal hip recruitment and positioning and eccentric body weight control with ambulation re-education including up and down stairs     Home Exercise Program:    [x] (10281) Reviewed/Progressed HEP activities related to strengthening, flexibility, endurance, ROM of core, proximal hip and LE for functional self-care, mobility, lifting and ambulation/stair navigation   [] (66985)Reviewed/Progressed HEP activities related to improving balance, coordination, kinesthetic sense, posture, motor skill, proprioception of core, proximal hip and LE for self care, mobility, lifting, and ambulation/stair navigation      Manual Treatments:  PROM / STM / Oscillations-Mobs:  G-I, II, III, IV (PA's, Inf., Post.)  [x] (66150) Provided manual therapy to mobilize LE, proximal hip and/or LS spine soft tissue/joints for the purpose of modulating pain, promoting relaxation,  increasing ROM, reducing/eliminating soft tissue swelling/inflammation/restriction, improving soft tissue extensibility and allowing for proper ROM for normal function with self care, mobility, lifting and ambulation. Modalities:  Vaso to R knee x 15 min after treatment. [x] GAME READY (VASO)- for significant edema, swelling, pain control.      Charges  Timed Code Treatment Minutes: 30'   Total Treatment Minutes: 48'         [x] EVAL (LOW) 19973   [] EVAL (MOD) 10450  [] EVAL (HIGH) 70056   [] RE-EVAL     [x] PZ(94613) x1     [] IONTO  [x] NMR (05328) x   1  [x] VASO  [] Manual (44298) x      [] Other:  [] TA x [] [facility] Traction (35154)  [] ES(attended) (72421)      [] ES (un) (17541):         GOALS:  Patient stated goal: return to walking  []? Progressing: []? Met: []? Not Met: []? Adjusted     Therapist goals for Patient:   Short Term Goals: To be achieved in: 2 weeks  1. Independent in HEP and progression per patient tolerance, in order to prevent re-injury. []? Progressing: []? Met: []? Not Met: []? Adjusted  2. Patient will have a decrease in pain to facilitate improvement in movement, function, and ADLs as indicated by Functional Deficits. []? Progressing: []? Met: []? Not Met: []? Adjusted     Long Term Goals: To be achieved in: 12 weeks  1. Disability index score of 69% or more per FOTO to assist with reaching prior level of function. []? Progressing: []? Met: []? Not Met: []? Adjusted  2. Patient will demonstrate increased AROM to 0-130 R knee or better to allow for proper joint functioning as indicated by patients Functional Deficits. []? Progressing: []? Met: []? Not Met: []? Adjusted  3. Patient will demonstrate an increase in Strength to good proximal hip strength and control, within 5lb HHD in LE to allow for proper functional mobility as indicated by patients Functional Deficits. []? Progressing: []? Met: []? Not Met: []? Adjusted  4. Patient will return to walking with assistive device with normalize gait pattern functional activities without increased symptoms or restriction. []? Progressing: []? Met: []? Not Met: []? Adjusted  5. Reciprocal gait with stairs (patient specific functional goal)    []? Progressing: []? Met: []? Not Met: []? Adjusted       Progression Towards Functional goals:  [] Patient is progressing as expected towards functional goals listed. [] Progression is slowed due to complexities listed. [] Progression has been slowed due to co-morbidities.   [x] Plan just implemented, too soon to assess goals progression  [] Other:         Overall Progression Towards Functional goals/ Treatment Progress Update:  [] Patient is progressing as expected towards functional goals listed. [] Progression is slowed due to complexities/Impairments listed. [] Progression has been slowed due to co-morbidities. [x] Plan just implemented, too soon to assess goals progression <30days   [] Goals require adjustment due to lack of progress  [] Patient is not progressing as expected and requires additional follow up with physician  [] Other    Prognosis for POC: [x] Good [] Fair  [] Poor      Patient requires continued skilled intervention: [x] Yes  [] No    Treatment/Activity Tolerance:  [x] Patient able to complete treatment  [] Patient limited by fatigue  [] Patient limited by pain    [] Patient limited by other medical complications  [] Other:     ASSESSMENT: patient presents with R knee pain, limited ROM, strength deficits, gait impairments as expected post-surgical procedure. Pt requires skilled intervention to restore ROM, strength, functional endurance and balance in order to perform ADLs without significant symptoms or limitations. Return to Play: (if applicable)   []  Stage 1: Intro to Strength   []  Stage 2: Return to Run and Strength   []  Stage 3: Return to Jump and Strength   []  Stage 4: Dynamic Strength and Agility   []  Stage 5: Sport Specific Training     []  Ready to Return to Play, Meets All Above Stages   []  Not Ready for Return to Sports   Comments:                               PLAN: See eval. Progress per protocol for meniscus repair. [] Continue per plan of care [] Alter current plan (see comments above)  [x] Plan of care initiated [] Hold pending MD visit [] Discharge      Electronically signed by:  Tori Enrique, [address]    Note: If patient does not return for scheduled/ recommended follow up visits, this note will serve as a discharge from care along with most recent update on progress.

## 2022-05-11 NOTE — PLAN OF CARE
Jose Alejandrokarolynluke, [address] [facility], [address]  Phone: [phone]   Fax: [phone]    Physical Therapy Certification    Dear Simuel Gosselin  ,    We had the pleasure of evaluating the following patient for physical therapy services at [address] [place] [address]. A summary of our findings can be found in the initial assessment below. This includes our plan of care. If you have any questions or concerns regarding these findings, please do not hesitate to contact me at the office phone number checked above.   Thank you for the referral.       Physician Signature:_______________________________Date:__________________  By signing above (or electronic signature), therapists plan is approved by physician    Patient: Segundo Lewis   : 1985   MRN: [id number]  Referring Physician:  Simuel Gosselin       Evaluation Date: 2022      Medical Diagnosis Information:  Diagnosis: P71.751C (ICD-10-CM) - Bucket-handle tear of medial meniscus of right knee as current injury, initial encounter   Treatment Diagnosis: 83.211D (ICD-10-CM) - Bucket-handle tear of medial meniscus of right knee as current injury, R knee pain M25.561, R knee effusion M25.461                                         Insurance information: PT Insurance Information: [facility]/30 visit / [facility] after 30       Precautions/ Contra-indications: 50% WB Brace locked in full extension for WB 0-2 weeks, 0-90 ROM in NWB    C-SSRS Triggered by Intake questionnaire (Past 2 wk assessment):   [x] No, Questionnaire did not trigger screening.   [] Yes, Patient intake triggered further evaluation      [] C-SSRS Screening completed  [] PCP notified via Plan of Care  [] Emergency services notified     Latex Allergy:  [x]NO      []YES  Preferred Language for Healthcare:   [x]English       []other:    SUBJECTIVE: Patient stated complaint: She reports that she injured herself on 4/10/2022. Vitaliy Barretoas that time she was moving in her bed when she twisted her knee.  She felt a popping sensation followed by pain.  Ever since then she reports locking of the knee. Patient s/p R Knee sx 5/6/22 for medial meniscus repair. Reports pain is tolerable and taking meds as directed. Reports just starting to ambulate with LE 50% WB today. Relevant Medical History: Previous ACL/ MMR  May 2021 from Dr. Claudell Sofia.  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,     Living Status/Prior Level of Function: Independent with ADLs and IADLs,  Walking/biking/ hiking,     OBJECTIVE:     ROM LEFT RIGHT   HIP Flex WFL WFL   HIP Abd     HIP Ext     HIP IR     HIP ER     Knee ext +10 -5   Knee Flex 159 85-90   Ankle PF     Ankle DF     Ankle In     Ankle Ev     Strength  LEFT RIGHT   HIP Flexors 4/5 grossly NT   HIP Abductors NT NT   HIP Ext     Hip ER     Knee EXT (quad) 4/5 Quad tone 3+/5  SLR: IND   Knee Flex (HS) 4/5 NT   Ankle DF     Ankle PF     Ankle Inv     Ankle EV          Circumference  Mid apex  7 cm prox     36cm   36.8cm     Reflexes/Sensation:    []Dermatomes/Myotomes intact    [x]Reflexes equal and normal bilaterally   []Other:    Joint mobility: NT   []Normal    []Hypo   []Hyper    Palpation: tender along joint line and incision area. Functional Mobility/Transfers: Patient ambulated into PT clinic with crutches and NWB on R LE. IND with transfers and lifting leg upon table. Posture: WFL. Bandages/Dressings/Incisions: Incision looks good and healing well. Sutures intact.  No signs of redness or drainage    Gait: (include devices/WB status) Patient ambulated NWB to PT clinic with crutches. Gait training performed 50% and patient instructed to perform as tolerated using crutches. Orthopedic Special Tests: NT due to post-op. - [facility]                       [x] Patient history, allergies, meds reviewed. Medical chart reviewed. See intake form. Review Of Systems (ROS):  [x]Performed Review of systems (Integumentary, CardioPulmonary, Neurological) by intake and observation. Intake form has been scanned into medical record. Patient has been instructed to contact their primary care physician regarding ROS issues if not already being addressed at this time.       Co-morbidities/Complexities (which will affect course of rehabilitation):   []None           Arthritic conditions   []Rheumatoid arthritis (M05.9)  []Osteoarthritis (M19.91)   Cardiovascular conditions   []Hypertension (I10)  []Hyperlipidemia (E78.5)  []Angina pectoris (I20)  []Atherosclerosis (I70)   Musculoskeletal conditions   []Disc pathology   []Congenital spine pathologies   [x]Prior surgical intervention  []Osteoporosis (M81.8)  []Osteopenia (M85.8)   Endocrine conditions   []Hypothyroid (E03.9)  []Hyperthyroid Gastrointestinal conditions   []Constipation (F49.61)   Metabolic conditions   []Morbid obesity (E66.01)  []Diabetes type 1(E10.65) or 2 (E11.65)   []Neuropathy (G60.9)     Pulmonary conditions   []Asthma (J45)  []Coughing   []COPD (J44.9)   Psychological Disorders  [x]Anxiety (F41.9)  []Depression (F32.9)   []Other:   [x]Other:   Previous ACL/MM sx R knee 2021       Barriers to/and or personal factors that will affect rehab potential:              []Age  []Sex              []Motivation/Lack of Motivation                        [x]Co-Morbidities               []Cognitive Function, education/learning barriers              []Environmental, home barriers              []profession/work barriers  []past PT/medical experience  []other:  Justification:     Falls Risk Assessment (30 days): [x] Falls Risk assessed and no intervention required. [] Falls Risk assessed and Patient requires intervention due to being higher risk   TUG score (>12s at risk):     [] Falls education provided, including           ASSESSMENT: patient presents with R knee pain, limited ROM, strength deficits, gait impairments as expected post-surgical procedure. Pt requires skilled intervention to restore ROM, strength, functional endurance and balance in order to perform ADLs without significant symptoms or limitations. Functional Impairments:     []Noted lumbar/proximal hip/LE joint hypomobility   [x]Decreased LE functional ROM   [x]Decreased core/proximal hip strength and neuromuscular control   [x]Decreased LE functional strength   [x]Reduced balance/proprioceptive control   []other:      Functional Activity Limitations (from functional questionnaire and intake)   [x]Reduced ability to tolerate prolonged functional positions   [x]Reduced ability or difficulty with changes of positions or transfers between positions   [x]Reduced ability to maintain good posture and demonstrate good body mechanics with sitting, bending, and lifting   []Reduced ability to sleep   [] Reduced ability or tolerance with driving and/or computer work   []Reduced ability to perform lifting, carrying tasks   [x]Reduced ability to squat   []Reduced ability to forward bend   [x]Reduced ability to ambulate prolonged functional periods/distances/surfaces   [x]Reduced ability to ascend/descend stairs   [x]Reduced ability to run, hop, cut or jump   []other:    Participation Restrictions   []Reduced participation in self care activities   [x]Reduced participation in home management activities   []Reduced participation in work activities   [x]Reduced participation in social activities. []Reduced participation in sport/recreation activities.     Classification :    [x]Signs/symptoms consistent with post-surgical status including decreased ROM, strength and function. []Signs/symptoms consistent with joint sprain/strain   []Signs/symptoms consistent with patella-femoral syndrome   []Signs/symptoms consistent with knee OA/hip OA   []Signs/symptoms consistent with internal derangement of knee/Hip   []Signs/symptoms consistent with functional hip weakness/NMR control      []Signs/symptoms consistent with tendinitis/tendinosis    []signs/symptoms consistent with pathology which may benefit from Dry needling      []other:      Prognosis/Rehab Potential:      []Excellent   [x]Good    []Fair   []Poor    Tolerance of evaluation/treatment:    []Excellent   [x]Good    []Fair   []Poor  Physical Therapy Evaluation Complexity Justification  [x] A history of present problem with:  [] no personal factors and/or comorbidities that impact the plan of care;  [x]1-2 personal factors and/or comorbidities that impact the plan of care  []3 personal factors and/or comorbidities that impact the plan of care  [x] An examination of body systems using standardized tests and measures addressing any of the following: body structures and functions (impairments), activity limitations, and/or participation restrictions;:  [] a total of 1-2 or more elements   [x] a total of 3 or more elements   [] a total of 4 or more elements   [x] A clinical presentation with:  [x] stable and/or uncomplicated characteristics   [] evolving clinical presentation with changing characteristics  [] unstable and unpredictable characteristics;   [x] Clinical decision making of [] low, [] moderate, [] high complexity using standardized patient assessment instrument and/or measurable assessment of functional outcome.     [x] EVAL (LOW) 42173 (typically 20 minutes face-to-face)  [] EVAL (MOD) 99900 (typically 30 minutes face-to-face)  [] EVAL (HIGH) 75509 (typically 45 minutes face-to-face)  [] RE-EVAL       PLAN:    Frequency/Duration:  2 days per week for 12 Weeks:  Interventions:  [x]  Therapeutic exercise including: strength training, ROM, for Lower extremity and core   [x]  NMR activation and proprioception for LE, Glutes and Core   [x]  Manual therapy as indicated for LE, Hip and spine to include: Dry Needling/IASTM, STM, PROM, Gr I-IV mobilizations, manipulation. [x] Modalities as needed that may include: thermal agents, E-stim, Biofeedback, US, iontophoresis as indicated  [] Patient education on joint protection, postural re-education, activity modification, progression of HEP. HEP instruction: (see scanned forms)  Access Code: [email or web address]  URL: [email or web address]  Date: 05/11/2022  Prepared by: Serg Lebron    Exercises  Long Sitting Ankle Pumps - 5-7 x daily - 7 x weekly - 3 sets - 10 reps - 1-2 hold  Long Sitting Quad Set - 5-7 x daily - 7 x weekly - 1 sets - 10 reps - 10 hold  Long Sitting Calf Stretch with Strap - 2 x daily - 7 x weekly - 1 sets - 5 reps - 30\" hold  Seated Table Hamstring Stretch - 2 x daily - 7 x weekly - 1 sets - 5 reps - 30\" hold  Supine Straight Leg Raises - 2 x daily - 7 x weekly - 2 sets - 10 reps  Sitting Heel Slide with Towel - 2 x daily - 7 x weekly - 1 sets - 10 reps - 10\" hold    GOALS:  Patient stated goal: return to walking  [] Progressing: [] Met: [] Not Met: [] Adjusted    Therapist goals for Patient:   Short Term Goals: To be achieved in: 2 weeks  1. Independent in HEP and progression per patient tolerance, in order to prevent re-injury. [] Progressing: [] Met: [] Not Met: [] Adjusted  2. Patient will have a decrease in pain to facilitate improvement in movement, function, and ADLs as indicated by Functional Deficits. [] Progressing: [] Met: [] Not Met: [] Adjusted    Long Term Goals: To be achieved in: 12 weeks  1. Disability index score of 69% or more per FOTO to assist with reaching prior level of function. [] Progressing: [] Met: [] Not Met: [] Adjusted  2.  Patient will demonstrate increased AROM to 0-130 R knee or better to allow for proper joint functioning as indicated by patients Functional Deficits. [] Progressing: [] Met: [] Not Met: [] Adjusted  3. Patient will demonstrate an increase in Strength to good proximal hip strength and control, within 5lb HHD in LE to allow for proper functional mobility as indicated by patients Functional Deficits. [] Progressing: [] Met: [] Not Met: [] Adjusted  4. Patient will return to walking with assistive device with normalize gait pattern functional activities without increased symptoms or restriction. [] Progressing: [] Met: [] Not Met: [] Adjusted  5.  Reciprocal gait with stairs (patient specific functional goal)    [] Progressing: [] Met: [] Not Met: [] Adjusted     Electronically signed by:  Cedrick Levine, [address] [place] [address]

## 2022-05-16 ENCOUNTER — HOSPITAL ENCOUNTER (OUTPATIENT)
Dept: PHYSICAL THERAPY | Age: 37
Setting detail: THERAPIES SERIES
Discharge: HOME | End: 2022-05-16
Payer: COMMERCIAL

## 2022-05-16 PROCEDURE — G0283 ELEC STIM OTHER THAN WOUND: HCPCS | Performed by: PHYSICAL THERAPIST

## 2022-05-16 PROCEDURE — 97110 THERAPEUTIC EXERCISES: CPT | Performed by: PHYSICAL THERAPIST

## 2022-05-16 PROCEDURE — 97112 NEUROMUSCULAR REEDUCATION: CPT | Performed by: PHYSICAL THERAPIST

## 2022-05-16 PROCEDURE — 97016 VASOPNEUMATIC DEVICE THERAPY: CPT | Performed by: PHYSICAL THERAPIST

## 2022-05-16 NOTE — FLOWSHEET NOTE
[place], [facility], [address]  Phone: [phone]   Fax: [phone]    Physical Therapy Treatment Note/ Progress Report:           Date:  2022    Patient Name:  Jimmie Riedel    :  1985  MRN: [id number]  Restrictions/Precautions:  50% WB Brace locked in full extension for WB 0-2 weeks, 0-90 ROM in NWB  Medical/Treatment Diagnosis Information:  · Diagnosis: S83.211A (ICD-10-CM) - Bucket-handle tear of medial meniscus of right knee as current injury, initial encounter  · Treatment Diagnosis: 83.211D (ICD-10-CM) - Bucket-handle tear of medial meniscus of right knee as current injury, R knee pain M25.561, R knee effusion M25.461 s/p MM Repair on 22. Insurance/Certification information:  PT Insurance Information: [address] 30 visit / Jenny Maldonado after 30       Physician Information:   Eddy Prader  Has the plan of care been signed (Y/N):        []  Yes  [x]  No     Date of Patient follow up with Physician: 22      Is this a Progress Report:     []  Yes  [x]  No        If Yes:  Date Range for reporting period:  Beginnin22  Endin/10/22    Progress report will be due (10 Rx or 30 days whichever is less): 57       Recertification will be due (POC Duration  / 90 days whichever is less): 12 weeks      Visit # Insurance Allowable Auth Required   In-person 2 30 visit / Jenny Maldonado after 30      []  Yes []  No    Telehealth   []  Yes []  No    Total          Functional Scale: FOTO: 43/100    Date assessed:  22      Therapy Diagnosis/Practice Pattern:4I      Number of Comorbidities:  []0     [x]1-2    []3    Latex Allergy:  [x]NO      []YES  Preferred Language for Healthcare:   [x]English       []other:      Pain level:  0-2/10     SUBJECTIVE:  Reports no new issues since last visit.  Has been wearing brace with ambulation in locked position, but upon arrival to PT totally forgot to put brace on before leaving for PT. See's MD tomorrow. OBJECTIVE:    Observation:    Test measurements:  85-90 knee flexion. -2 knee ext. OBJECTIVE  Test used 5/11/22 Current Score   Pain Summary  2-5/10    Functional questionnaire FOTO 43%    Functional Testing MP girth L/R 36/36.8cm     Knee flex 85-90    ROM Knee ext -5          Strength Quad tone 3+/5           RESTRICTIONS/PRECAUTIONS: 50% WB Brace locked in full extension for WB 0-2 weeks, 0-90 ROM in NWB. See protocol. Previous ACL/ MMR  May 2021 from Dr. Sun Sapp.  Exercises/Interventions:   2 weeks post-op 5/20/22    Therapeutic Ex (02223) Sets/sec Reps Notes/CUES   QS/AP with NMR H10 10 HEP   SLR with NMR H5 10 HEP   GS with strap h30 5 HEP   HS stretches in sitting H30 5 HEP   Heel slides to 90 only H10 10 Instructed to do in brace at home   Knee ext propping with GS stretches and QS  4'          Sitting knee flexion off EOB H10 10 reps To 90 only   SL hip abd H5 2x10 reps                      Manual Intervention (86711)            Patellar mobilizations 5'                             NMR re-education (39490)   CUES NEEDED   Patient education on HEP, gait training 50% WB, use of ice           [place] ES with QS/SLR H10/10 10' 5' each exercise                                       Therapeutic Activity (08755)            Gait training with brace/crutches                                HEP instruction: (see scanned forms)  Access Code: [email or web address]  URL: [email or web address]  Date: 05/11/2022  Prepared by: Katie Castillo     Exercises  Long Sitting Ankle Pumps - 5-7 x daily - 7 x weekly - 3 sets - 10 reps - 1-2 hold  Long Sitting Quad Set - 5-7 x daily - 7 x weekly - 1 sets - 10 reps - 10 hold  Long Sitting Calf Stretch with Strap - 2 x daily - 7 x weekly - 1 sets - 5 reps - 30\" hold  Seated Table Hamstring Stretch - 2 x daily - 7 x weekly - 1 sets - 5 reps - 30\" hold  Supine Straight Leg Raises - 2 x daily - 7 x weekly - 2 sets - 10 reps  Sitting Heel Slide with Towel - 2 x daily - 7 x weekly - 1 sets - 10 reps - 10\" hold     Therapeutic Exercise and NMR EXR  [x] (28182) Provided verbal/tactile cueing for activities related to strengthening, flexibility, endurance, ROM for improvements in LE, proximal hip, and core control with self care, mobility, lifting, ambulation.  [] (75173) Provided verbal/tactile cueing for activities related to improving balance, coordination, kinesthetic sense, posture, motor skill, proprioception  to assist with LE, proximal hip, and core control in self care, mobility, lifting, ambulation and eccentric single leg control.      NMR and Therapeutic Activities:    [x] (17585 or 69810) Provided verbal/tactile cueing for activities related to improving balance, coordination, kinesthetic sense, posture, motor skill, proprioception and motor activation to allow for proper function of core, proximal hip and LE with self care and ADLs  [] (17000) Gait Re-education- Provided training and instruction to the patient for proper LE, core and proximal hip recruitment and positioning and eccentric body weight control with ambulation re-education including up and down stairs     Home Exercise Program:    [x] (13133) Reviewed/Progressed HEP activities related to strengthening, flexibility, endurance, ROM of core, proximal hip and LE for functional self-care, mobility, lifting and ambulation/stair navigation   [] (95566)Reviewed/Progressed HEP activities related to improving balance, coordination, kinesthetic sense, posture, motor skill, proprioception of core, proximal hip and LE for self care, mobility, lifting, and ambulation/stair navigation      Manual Treatments:  PROM / STM / Oscillations-Mobs:  G-I, II, III, IV (PA's, Inf., Post.)  [x] (35576) Provided manual therapy to mobilize LE, proximal hip and/or LS spine soft tissue/joints for the purpose of modulating pain, promoting relaxation, increasing ROM, reducing/eliminating soft tissue swelling/inflammation/restriction, improving soft tissue extensibility and allowing for proper ROM for normal function with self care, mobility, lifting and ambulation. Modalities:  Vaso to R knee x 15 min after treatment. [x] GAME READY (VASO)- for significant edema, swelling, pain control. Charges  Timed Code Treatment Minutes: 35'   Total Treatment Minutes: 61'         [] EVAL (LOW) 455 1011   [] EVAL (MOD) 55346  [] EVAL (HIGH) 14099   [] RE-EVAL     [x] HS(33831) x1     [] IONTO  [x] NMR (69024) x   1  [x] VASO  [] Manual (55898) x      [] Other:  [] TA x      [] Mech Traction (03657)  [] ES(attended) (16834)      [x] ES (un) (81228):         GOALS:  Patient stated goal: return to walking  []? Progressing: []? Met: []? Not Met: []? Adjusted     Therapist goals for Patient:   Short Term Goals: To be achieved in: 2 weeks  1. Independent in HEP and progression per patient tolerance, in order to prevent re-injury. []? Progressing: []? Met: []? Not Met: []? Adjusted  2. Patient will have a decrease in pain to facilitate improvement in movement, function, and ADLs as indicated by Functional Deficits. []? Progressing: []? Met: []? Not Met: []? Adjusted     Long Term Goals: To be achieved in: 12 weeks  1. Disability index score of 69% or more per FOTO to assist with reaching prior level of function. []? Progressing: []? Met: []? Not Met: []? Adjusted  2. Patient will demonstrate increased AROM to 0-130 R knee or better to allow for proper joint functioning as indicated by patients Functional Deficits. []? Progressing: []? Met: []? Not Met: []? Adjusted  3. Patient will demonstrate an increase in Strength to good proximal hip strength and control, within 5lb HHD in LE to allow for proper functional mobility as indicated by patients Functional Deficits. []? Progressing: []? Met: []? Not Met: []? Adjusted  4.  Patient will return to walking with assistive device with normalize gait pattern functional activities without increased symptoms or restriction. []? Progressing: []? Met: []? Not Met: []? Adjusted  5. Reciprocal gait with stairs (patient specific functional goal)    []? Progressing: []? Met: []? Not Met: []? Adjusted       Progression Towards Functional goals:  [] Patient is progressing as expected towards functional goals listed. [] Progression is slowed due to complexities listed. [] Progression has been slowed due to co-morbidities. [x] Plan just implemented, too soon to assess goals progression  [] Other:         Overall Progression Towards Functional goals/ Treatment Progress Update:  [] Patient is progressing as expected towards functional goals listed. [] Progression is slowed due to complexities/Impairments listed. [] Progression has been slowed due to co-morbidities. [x] Plan just implemented, too soon to assess goals progression <30days   [] Goals require adjustment due to lack of progress  [] Patient is not progressing as expected and requires additional follow up with physician  [] Other    Prognosis for POC: [x] Good [] Fair  [] Poor      Patient requires continued skilled intervention: [x] Yes  [] No    Treatment/Activity Tolerance:  [x] Patient able to complete treatment  [] Patient limited by fatigue  [] Patient limited by pain    [] Patient limited by other medical complications  [] Other:     ASSESSMENT: patient presents with R knee pain, limited ROM, strength deficits, gait impairments as expected post-surgical procedure. Increased knee ext noted this visit. Pt requires skilled intervention to restore ROM, strength, functional endurance and balance in order to perform ADLs without significant symptoms or limitations.    Return to Play: (if applicable)   []  Stage 1: Intro to Strength   []  Stage 2: Return to Run and Strength   []  Stage 3: Return to Jump and Strength   []  Stage 4: Dynamic Strength and Agility   []  Stage 5: Sport Specific Training     []  Ready to Return to Play, Meets All Above Stages   []  Not Ready for Return to Sports   Comments:                               PLAN: See eval. Progress per protocol for meniscus repair. [x] Continue per plan of care [] Alter current plan (see comments above)  [] Plan of care initiated [] Hold pending MD visit [] Discharge      Electronically signed by:  Panfilo Womack, [address] [place] [address]    Note: If patient does not return for scheduled/ recommended follow up visits, this note will serve as a discharge from care along with most recent update on progress.

## 2022-05-17 ENCOUNTER — OFFICE VISIT (OUTPATIENT)
Dept: ORTHOPEDIC SURGERY | Age: 37
End: 2022-05-17

## 2022-05-17 VITALS — HEIGHT: 65 IN | WEIGHT: 140 LBS | BODY MASS INDEX: 23.32 KG/M2

## 2022-05-17 DIAGNOSIS — S83.211A BUCKET-HANDLE TEAR OF MEDIAL MENISCUS OF RIGHT KNEE AS CURRENT INJURY, INITIAL ENCOUNTER: Primary | ICD-10-CM

## 2022-05-17 PROCEDURE — 99024 POSTOP FOLLOW-UP VISIT: CPT | Performed by: ORTHOPAEDIC SURGERY

## 2022-05-18 NOTE — PROGRESS NOTES
5/17/2022     Reason for visit:  Status post right knee arthroscopic medial meniscus repair via inside-out technique on 5/6/2022    History of Present Illness:  Patient presents for postop evaluation. Overall she is doing well. She is using the brace and crutches as instructed. No fever or chills. No numbness or tingling. Objective:  Ht 5' 5\" (1.651 m)   Wt 140 lb (63.5 kg)   LMP 04/28/2022   BMI 23.30 kg/m²      Physical Exam:  The patient is well-appearing and in no apparent distress  Examination of the right knee   There is a small effusion, no gross deformity or skin changes  No ligamentous instability is noted, range of motion reveals 0 to 90 degrees  5 out of 5 strength throughout distal muscle groups  Sensation is intact to light touch throughout all distributions  There is no calf swelling or tenderness  Palpable DP pulse, brisk cap refill, 2+ symmetric reflexes      Assessment:  Status post right knee arthroscopic medial meniscus repair via inside-out technique on 5/6/2022    Plan:  Overall the patient is doing well. Continue physical therapy per protocol. Return to see me in 4 weeks. Eddy Prader MD            Orthopa[facility] Surgery [facility] and [address] and [address] [facility]            Team Physician [facility] ([place]      Disclaimer: This note was dictated with voice recognition software. Though review and correction are routine, we apologize for any errors.

## 2022-05-20 ENCOUNTER — HOSPITAL ENCOUNTER (OUTPATIENT)
Dept: PHYSICAL THERAPY | Age: 37
Setting detail: THERAPIES SERIES
Discharge: HOME | End: 2022-05-20
Payer: COMMERCIAL

## 2022-05-20 NOTE — FLOWSHEET NOTE
Physical Therapy  Cancellation/No-show Note  Patient Name:  Neo Jaffe  :  1985   Date:  2022  Cancelled visits to date: 1  No-shows to date: 0    Patient status for today's appointment patient:  [x]  Cancelled  []  Rescheduled appointment  []  No-show     Reason given by patient:  []  Patient ill  []  Conflicting appointment  []  No transportation    []  Conflict with work  []  No reason given  [x]  Other:     Comments:  Family emergency    Phone call information:   []  Phone call made today to patient at _ time at number provided:      []  Patient answered, conversation as follows:    []  Patient did not answer, message left as follows:  []  Phone call not made today  [x]  Phone call not needed - pt contacted us to cancel and provided reason for cancellation.      Electronically signed by:  Judith Singh, [address] [place] [address]

## 2022-05-23 ENCOUNTER — TELEMEDICINE (OUTPATIENT)
Dept: FAMILY MEDICINE CLINIC | Age: 37
End: 2022-05-23
Payer: COMMERCIAL

## 2022-05-23 ENCOUNTER — HOSPITAL ENCOUNTER (OUTPATIENT)
Dept: PHYSICAL THERAPY | Age: 37
Setting detail: THERAPIES SERIES
Discharge: HOME | End: 2022-05-23
Payer: COMMERCIAL

## 2022-05-23 DIAGNOSIS — F41.0 PANIC ATTACK: Primary | ICD-10-CM

## 2022-05-23 PROCEDURE — 99213 OFFICE O/P EST LOW 20 MIN: CPT | Performed by: NURSE PRACTITIONER

## 2022-05-23 PROCEDURE — 97140 MANUAL THERAPY 1/> REGIONS: CPT | Performed by: PHYSICAL THERAPIST

## 2022-05-23 PROCEDURE — G8420 CALC BMI NORM PARAMETERS: HCPCS | Performed by: NURSE PRACTITIONER

## 2022-05-23 PROCEDURE — 97112 NEUROMUSCULAR REEDUCATION: CPT | Performed by: PHYSICAL THERAPIST

## 2022-05-23 PROCEDURE — G8427 DOCREV CUR MEDS BY ELIG CLIN: HCPCS | Performed by: NURSE PRACTITIONER

## 2022-05-23 PROCEDURE — 4004F PT TOBACCO SCREEN RCVD TLK: CPT | Performed by: NURSE PRACTITIONER

## 2022-05-23 PROCEDURE — 97110 THERAPEUTIC EXERCISES: CPT | Performed by: PHYSICAL THERAPIST

## 2022-05-23 ASSESSMENT — ENCOUNTER SYMPTOMS
SINUS PRESSURE: 0
CHEST TIGHTNESS: 0
COUGH: 0
DIARRHEA: 0
PHOTOPHOBIA: 0
CHOKING: 0
VOMITING: 0
TROUBLE SWALLOWING: 0
BLOOD IN STOOL: 0
CONSTIPATION: 0
EYE PAIN: 0
EYE DISCHARGE: 0
SHORTNESS OF BREATH: 0
NAUSEA: 0
ABDOMINAL PAIN: 0
SORE THROAT: 0
EYE ITCHING: 0
BACK PAIN: 0
COLOR CHANGE: 0
RHINORRHEA: 0
SINUS PAIN: 0
STRIDOR: 0
WHEEZING: 0
VOICE CHANGE: 0
EYE REDNESS: 0

## 2022-05-23 NOTE — FLOWSHEET NOTE
[place], [facility] [place], [address]  Phone: [phone]   Fax: [phone]    Physical Therapy Treatment Note/ Progress Report:           Date:  2022    Patient Name:  Edita Carrillo    :  1985  MRN: [id number]  Restrictions/Precautions:  50% WB Brace locked in full extension for WB 0-2 weeks, 0-90 ROM in NWB  Medical/Treatment Diagnosis Information:  · Diagnosis: S83.211A (ICD-10-CM) - Bucket-handle tear of medial meniscus of right knee as current injury, initial encounter  · Treatment Diagnosis: 83.211D (ICD-10-CM) - Bucket-handle tear of medial meniscus of right knee as current injury, R knee pain M25.561, R knee effusion M25.461 s/p MM Repair on 22. Insurance/Certification information:  PT Insurance Information: [address] 30 visit / [place] after 30       Physician Information:   Parker Bauman   Has the plan of care been signed (Y/N):        []  Yes  [x]  No     Date of Patient follow up with Physician: 22      Is this a Progress Report:     [x]  Yes  []  No        If Yes:  Date Range for reporting period:  Beginnin22  Endin/10/22    Progress report will be due (10 Rx or 30 days whichever is less):        Recertification will be due (POC Duration  / 90 days whichever is less): 12 weeks      Visit # Insurance Allowable Auth Required   In-person 2 30 visit / [place] after 30      []  Yes []  No    Telehealth   []  Yes []  No    Total          Functional Scale: FOTO: 43/100    Date assessed:  22      Therapy Diagnosis/Practice Pattern:4I      Number of Comorbidities:  []0     [x]1-2    []3    Latex Allergy:  [x]NO      []YES  Preferred Language for Healthcare:   [x]English       []other:      Pain level:  0-2/10     SUBJECTIVE:  Reports having a lot going on. Having a lot of anxiety due to personal issues.   Was not wearing the brace with ambulation upon arrival and reports not wearing the brace much since last visit due to having issues with getting it comfortable and putting it on. .  Reports slight bend to no bend when walking without brace. Has not squatted at all. Reports seeing MD last week. No issues to report from MD.  OBJECTIVE:  Brace adjusted 0-90 this visit. Re-instructed how to place brace and adjust straps this visit. Re-emphasized importance of compliance with brace and post-op restrictions and precautions. Reviewed no squatting in WB is permitted >57. Progressed HEP this visit.  Observation:    Test measurements:  85-90 knee flexion. -2 knee ext. OBJECTIVE  Test used 5/11/22 Current Score   Pain Summary  2-5/10 0/10   Functional questionnaire FOTO 43%    Functional Testing MP girth L/R 36/36.8cm     Knee flex 85-90 110 this visit   ROM Knee ext -5 0         Strength Quad tone 3+/5           RESTRICTIONS/PRECAUTIONS:  2-6 weeks post-op 0-90 with brace. D/C crutches after 4 weeks. Brace d/c after 6 weeks. ROM WFL as tolerated in NWB  No WB with flexion >90. ( 8 weeks) Previous ACL/ MMR  May 2021 from Dr. Margret Junior.  Exercises/Interventions:   2 weeks post-op 5/20/22    Therapeutic Ex (61078) Sets/sec Reps Notes/CUES   QS/AP with NMR H10 10 HEP   SLR with NMR H5 3x10 HEP   GS with strap h30 5 HEP   HS stretches in sitting H30 5 HEP   Heel slides  With strap H10 10 Instructed to do in brace at home   Knee ext propping with GS stretches and QS  4'          Sitting knee flexion off EOB SL hip abd H5 3x10 reps          Standing HR H5 2x10 reps + to HEP         Manual Intervention (86203)            Patellar mobilizations 5'     Access Code: [email or web address]                          NMR re-education (88387)   CUES NEEDED   Patient education on HEP, gait training 50% WB, use of ice           [place] ES with QS/SLR    Unable to do since wearing jeans today                                 Therapeutic Activity (94400) Gait training with brace/crutches                                HEP instruction: (see scanned forms)  Access Code: [email or web address]  URL: [email or web address]  Date: 05/11/2022  Prepared by: Darlin Shay     Exercises  Long Sitting Ankle Pumps - 5-7 x daily - 7 x weekly - 3 sets - 10 reps - 1-2 hold  Long Sitting Quad Set - 5-7 x daily - 7 x weekly - 1 sets - 10 reps - 10 hold  Long Sitting Calf Stretch with Strap - 2 x daily - 7 x weekly - 1 sets - 5 reps - 30\" hold  Seated Table Hamstring Stretch - 2 x daily - 7 x weekly - 1 sets - 5 reps - 30\" hold  Supine Straight Leg Raises - 2 x daily - 7 x weekly - 2 sets - 10 reps  Sitting Heel Slide with Towel - 2 x daily - 7 x weekly - 1 sets - 10 reps - 10\" hold    Access Code: [email or web address]  URL: [email or web address]  Date: 05/23/2022  Prepared by: Darlin Shay    Exercises ; WB exercises with brace on. Standing Hip Abduction with Counter Support - 1 x daily - 7 x weekly - 2-3 sets - 10 reps - 2 hold  Heel Toe Raises with Counter Support - 1 x daily - 7 x weekly - 2-3 sets - 10 reps  Prone Hip Extension - 2 x daily - 7 x weekly - 10 reps - 2-3 sets - 5 hold  Seated Long Arc Quad - 2 x daily - 7 x weekly - 10 reps - 3 sets - 1 second hold  Side Leg Lifts - 2 x daily - 7 x weekly - 10 reps - 2 sets - 5\" hold  Supine Heel Slide with Strap - 2 x daily - 7 x weekly - 10 reps - 3 sets - 10\" hold     Therapeutic Exercise and NMR EXR  [x] (78463) Provided verbal/tactile cueing for activities related to strengthening, flexibility, endurance, ROM for improvements in LE, proximal hip, and core control with self care, mobility, lifting, ambulation.  [] (57658) Provided verbal/tactile cueing for activities related to improving balance, coordination, kinesthetic sense, posture, motor skill, proprioception  to assist with LE, proximal hip, and core control in self care, mobility, lifting, ambulation and eccentric single leg control.      NMR and Therapeutic Activities:    [x] (76064 or ) Provided verbal/tactile cueing for activities related to improving balance, coordination, kinesthetic sense, posture, motor skill, proprioception and motor activation to allow for proper function of core, proximal hip and LE with self care and ADLs  [] (48689) Gait Re-education- Provided training and instruction to the patient for proper LE, core and proximal hip recruitment and positioning and eccentric body weight control with ambulation re-education including up and down stairs     Home Exercise Program:    [x] (85765) Reviewed/Progressed HEP activities related to strengthening, flexibility, endurance, ROM of core, proximal hip and LE for functional self-care, mobility, lifting and ambulation/stair navigation   [] (14738)Reviewed/Progressed HEP activities related to improving balance, coordination, kinesthetic sense, posture, motor skill, proprioception of core, proximal hip and LE for self care, mobility, lifting, and ambulation/stair navigation      Manual Treatments:  PROM / STM / Oscillations-Mobs:  G-I, II, III, IV (PA's, Inf., Post.)  [x] (49922) Provided manual therapy to mobilize LE, proximal hip and/or LS spine soft tissue/joints for the purpose of modulating pain, promoting relaxation,  increasing ROM, reducing/eliminating soft tissue swelling/inflammation/restriction, improving soft tissue extensibility and allowing for proper ROM for normal function with self care, mobility, lifting and ambulation. Modalities:  Patient deferred due to time constraint. [] GAME READY (VASO)- for significant edema, swelling, pain control.      Charges  Timed Code Treatment Minutes: 39'   Total Treatment Minutes: 39'         [] EVAL (LOW) 455 1011   [] EVAL (MOD) 38475  [] EVAL (HIGH) 70651   [] RE-EVAL     [x] XD(06066) x2    [] IONTO  [x] NMR (32442) x   1  [] VASO  [] Manual (38552) x      [] Other:  [] TA x      [] Mech Traction (75034)  [] ES(attended) (11339)      [] ES (un) (27911): GOALS:  Patient stated goal: return to walking  []? Progressing: []? Met: []? Not Met: []? Adjusted     Therapist goals for Patient:   Short Term Goals: To be achieved in: 2 weeks  1. Independent in HEP and progression per patient tolerance, in order to prevent re-injury. []? Progressing: []? Met: []? Not Met: []? Adjusted  2. Patient will have a decrease in pain to facilitate improvement in movement, function, and ADLs as indicated by Functional Deficits. []? Progressing: []? Met: []? Not Met: []? Adjusted     Long Term Goals: To be achieved in: 12 weeks  1. Disability index score of 69% or more per FOTO to assist with reaching prior level of function. []? Progressing: []? Met: []? Not Met: []? Adjusted  2. Patient will demonstrate increased AROM to 0-130 R knee or better to allow for proper joint functioning as indicated by patients Functional Deficits. []? Progressing: []? Met: []? Not Met: []? Adjusted  3. Patient will demonstrate an increase in Strength to good proximal hip strength and control, within 5lb HHD in LE to allow for proper functional mobility as indicated by patients Functional Deficits. []? Progressing: []? Met: []? Not Met: []? Adjusted  4. Patient will return to walking with assistive device with normalize gait pattern functional activities without increased symptoms or restriction. []? Progressing: []? Met: []? Not Met: []? Adjusted  5. Reciprocal gait with stairs (patient specific functional goal)    []? Progressing: []? Met: []? Not Met: []? Adjusted       Progression Towards Functional goals:  [] Patient is progressing as expected towards functional goals listed. [] Progression is slowed due to complexities listed. [] Progression has been slowed due to co-morbidities.   [x] Plan just implemented, too soon to assess goals progression  [] Other:         Overall Progression Towards Functional goals/ Treatment Progress Update:  [] Patient is progressing as expected towards functional goals listed. [] Progression is slowed due to complexities/Impairments listed. [] Progression has been slowed due to co-morbidities. [x] Plan just implemented, too soon to assess goals progression <30days   [] Goals require adjustment due to lack of progress  [] Patient is not progressing as expected and requires additional follow up with physician  [] Other    Prognosis for POC: [x] Good [] Fair  [] Poor      Patient requires continued skilled intervention: [x] Yes  [] No    Treatment/Activity Tolerance:  [x] Patient able to complete treatment  [] Patient limited by fatigue  [] Patient limited by pain    [] Patient limited by other medical complications  [] Other:     ASSESSMENT: patient presents with R knee pain, limited ROM, strength deficits, gait impairments as expected post-surgical procedure. Increased knee ext/knee flex noted this visit. Brace unlocked to 90 this visit. Re-instructed patient how to adjust brace. Brace comfortable after adjustments at end of treatment. Patient left clinic with wearing brace. Pt requires skilled intervention to restore ROM, strength, functional endurance and balance in order to perform ADLs without significant symptoms or limitations. Return to Play: (if applicable)   []  Stage 1: Intro to Strength   []  Stage 2: Return to Run and Strength   []  Stage 3: Return to Jump and Strength   []  Stage 4: Dynamic Strength and Agility   []  Stage 5: Sport Specific Training     []  Ready to Return to Play, Meets All Above Stages   []  Not Ready for Return to Sports   Comments:                               PLAN: See eval. Progress per protocol for meniscus repair.     [x] Continue per plan of care [] Alter current plan (see comments above)  [] Plan of care initiated [] Hold pending MD visit [] Discharge      Electronically signed by:  Sarah Medina, [address] [place] [address] Road    Note: If patient does not return for scheduled/ recommended follow up visits, this note will serve as a discharge from care along with most recent update on progress.
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
## 2022-05-24 ENCOUNTER — TELEMEDICINE (OUTPATIENT)
Dept: FAMILY MEDICINE CLINIC | Age: 37
End: 2022-05-24
Payer: COMMERCIAL

## 2022-05-24 DIAGNOSIS — F39 MOOD DISORDER (HCC): Primary | ICD-10-CM

## 2022-05-24 PROCEDURE — 4004F PT TOBACCO SCREEN RCVD TLK: CPT | Performed by: NURSE PRACTITIONER

## 2022-05-24 PROCEDURE — G8420 CALC BMI NORM PARAMETERS: HCPCS | Performed by: NURSE PRACTITIONER

## 2022-05-24 PROCEDURE — 99214 OFFICE O/P EST MOD 30 MIN: CPT | Performed by: NURSE PRACTITIONER

## 2022-05-24 PROCEDURE — G8427 DOCREV CUR MEDS BY ELIG CLIN: HCPCS | Performed by: NURSE PRACTITIONER

## 2022-05-24 RX ORDER — ARIPIPRAZOLE 5 MG/1
5 TABLET ORAL DAILY
Qty: 30 TABLET | Refills: 3 | Status: SHIPPED | OUTPATIENT
Start: 2022-05-24

## 2022-05-24 NOTE — PATIENT INSTRUCTIONS
Mental Health Agencies    Nahomi Alegre [facility]:  At [facility], we offer a wide range of mental health services, including Psychiatric assessment and medication management, Psychotherapy, Transcranial Magnetic Stimulation, and Pharmacogenomic Testing and Consultation. *   Call [phone]       *   Multiple locations                 -Tom Ren Location: [address] [place], [address] [place] Location: [address] [place] [address] SYSTEM:  Psychological assessment, psychiatric/medication management, individual, family, couple, and group counseling, and intensive outpatient programs. [address] locations. *  Call [phone] or [phone] (toll free)       *  Multiple locations:                  - [place]: [address], [address] [facility], [address]: [facility], [address] [facility] [address], [address] [place]: [facility] [address]: [address], [address], [address] [facility] 22: [place] [address], [address] [facility] [address]. [place]: [address] [place], [facility] [address], [place]. [facility] [address]: [address] [place] [address]    [facility]:  Offers comprehensive substance abuse and addiction treatment. Includes medical treatment/medication management, psychological therapy, and case management/social support services. *  Call: [phone]       *  Multiple locations:                  [facility]: [address], [address]: [facility] [address] [facility] [address] e [facility]: [address], [facility] [address] [facility]: [place] [address], [address] [facility] [address] [facility] [address]:   Outpatient counseling and addiction assessment and services, including individual, group, and family/couple therapy. Accepts many private insurance plans and offers a tiered payment plan for self-pay patients. Call for verification that your plan is accepted. *  Call [phone]       *  Multiple locations:                  - Allison Nicholas: [address] [facility], [address] [facility] [address] [place], [address]: [address] [facility], [address] [facility] [address] [facility] [address]: [facility] [address] [facility] [address]: [address]: [facility] 139, [facility], [address] [facility] 120 [facility]:  Treatment for substance abuse/addiction and mental health disorders. Outpatient treatment program available for adults and children/adolescents. Accepts most major private insurance and some Medicare and Medicaid programs.           *  Call: [phone] to schedule an initial assessment       *  [facility], [facility] [address]

## 2022-05-24 NOTE — PROGRESS NOTES
Maxi Carroll (:  1985) is a Established patient, here for evaluation of the following:    Assessment & Plan   Below is the assessment and plan developed based on review of pertinent history, physical exam, labs, studies, and medications. 1. Mood disorder ([address]   Stable; Not progressing; Discussed concerns for a mood disorder. Will add Abilify. Continue Lexapro. Sent patient information for psychology and psychiatry. ER precautions discussed. -     ARIPiprazole (ABILIFY) 5 MG tablet; Take 1 tablet by mouth daily, Disp-30 tablet, R-3Normal    No follow-ups on file. Subjective   HPI   X 3 weeks  Feels psychotic- like she cannot get control of self  Is sad, raging all the time  People she is around don't understand mental health  Does not feel ok  Cannot let go of anger or sadness- is overwhelmed  Is affecting life with boyfriend and children  Can still shower and take care of self  Is getting paranoid- feels like people don't have her best interest at heart- thinks it might be internal  Feels manic- wants to go go go  Will only sleep for a couple hours- will jolt self awake- then panic sets in  Denies SI or HI  Is recovering from knee surgery- feels like a burden  Stopped taking propranolol > 3 weeks ago  Has been taking Lexapro 20 mg, was taking propranolol  Needs therapy per patient    Review of Systems       Objective   Patient-Reported Vitals  Patient-Reported Weight: 140 lb  Patient-Reported Height: 5'5       Physical Exam  Constitutional:       Appearance: Normal appearance. HENT:      Head: Normocephalic. Right Ear: External ear normal.      Left Ear: External ear normal.      Nose: Nose normal.   Pulmonary:      Effort: No respiratory distress. Neurological:      Mental Status: She is alert. Psychiatric:         Mood and Affect: Mood is anxious. Affect is tearful. Thought Content: Thought content does not include homicidal or suicidal ideation.  Thought content does not include homicidal or suicidal plan. Lakeisha Walden, was evaluated through a synchronous (real-time) audio-video encounter. The patient (or guardian if applicable) is aware that this is a billable service, which includes applicable co-pays. This Virtual Visit was conducted with patient's (and/or legal guardian's) consent. The visit was conducted pursuant to the emergency declaration under the [address] [place] [address] and the [employer] and [employer] Act. Patient identification was verified, and a caregiver was present when appropriate. The patient was located at home in a state where the provider was licensed to provide care.        --Carole Graham, CHARLEE - CNP

## 2022-05-25 ENCOUNTER — HOSPITAL ENCOUNTER (OUTPATIENT)
Dept: PHYSICAL THERAPY | Age: 37
Setting detail: THERAPIES SERIES
Discharge: HOME | End: 2022-05-25
Payer: COMMERCIAL

## 2022-05-25 PROCEDURE — 97112 NEUROMUSCULAR REEDUCATION: CPT | Performed by: PHYSICAL THERAPIST

## 2022-05-25 PROCEDURE — G0283 ELEC STIM OTHER THAN WOUND: HCPCS | Performed by: PHYSICAL THERAPIST

## 2022-05-25 PROCEDURE — 97110 THERAPEUTIC EXERCISES: CPT | Performed by: PHYSICAL THERAPIST

## 2022-05-25 NOTE — FLOWSHEET NOTE
[place], [address] [facility], [address]  Phone: [phone]   Fax: [phone]    Physical Therapy Treatment Note/ Progress Report:           Date:  2022    Patient Name:  Neo Jaffe    :  1985  MRN: [id number]  Restrictions/Precautions:  50% WB Brace locked in full extension for WB 0-2 weeks, 0-90 ROM in NWB  Medical/Treatment Diagnosis Information:  · Diagnosis: S83.211A (ICD-10-CM) - Bucket-handle tear of medial meniscus of right knee as current injury, initial encounter  · Treatment Diagnosis: 83.211D (ICD-10-CM) - Bucket-handle tear of medial meniscus of right knee as current injury, R knee pain M25.561, R knee effusion M25.461 s/p MM Repair on 22. Insurance/Certification information:  PT Insurance Information: [address] 30 visit / Lawernce Chess after 30       Physician Information:   Vipin Wise   Has the plan of care been signed (Y/N):        []  Yes  [x]  No     Date of Patient follow up with Physician: 22      Is this a Progress Report:     []  Yes  [x]  No        If Yes:  Date Range for reporting period:  Beginnin22  Endin/10/22    Progress report will be due (10 Rx or 30 days whichever is less):        Recertification will be due (POC Duration  / 90 days whichever is less): 12 weeks      Visit # Insurance Allowable Auth Required   In-person 4 30 visit / Lawernce Chess after 30      []  Yes []  No    Telehealth   []  Yes []  No    Total          Functional Scale: FOTO: 43/100    Date assessed:  22      Therapy Diagnosis/Practice Pattern:4I      Number of Comorbidities:  []0     [x]1-2    []3    Latex Allergy:  [x]NO      []YES  Preferred Language for Healthcare:   [x]English       []other:      Pain level:  0-2/10     SUBJECTIVE:  Reports having a lot going on. Having a lot of anxiety due to personal issues.  Reports getting new meds to help with anxiety. Reports comfortable with adjusting brace and is now more compliant with wearing it with walking. No issues with new exercises but has not been able to perform them since last visit  OBJECTIVE:    Re-emphasized importance of compliance with brace and post-op restrictions and precautions. Reviewed no squatting in WB is permitted >14.  Observation:    Test measurements:  85-90 knee flexion. -2 knee ext. OBJECTIVE  Test used 5/11/22 Current Score   Pain Summary  2-5/10 0/10   Functional questionnaire FOTO 43%    Functional Testing MP girth L/R 36/36.8cm     Knee flex 85-90 110 this visit   ROM Knee ext -5 0         Strength Quad tone 3+/5           RESTRICTIONS/PRECAUTIONS:  2-6 weeks post-op 0-90 with brace. D/C crutches after 4 weeks. Brace d/c after 6 weeks. ROM WFL as tolerated in NWB  No WB with flexion >90. ( 8 weeks) Previous ACL/ MMR  May 2021 from Dr. Sandeep Shaw.  Exercises/Interventions:   2 weeks post-op 5/20/22    Therapeutic Ex (36974) Sets/sec Reps Notes/CUES   QS/AP with NMR H10 10 HEP   SLR with NMR H5 3x10 HEP   GS with strap h30 5 HEP   HS stretches in sitting H30 5 HEP   Heel slides  With strap H10 10 Instructed to do in brace at home   Knee ext propping with GS stretches and QS  4'          Sitting knee flexion off EOB SL hip abd H5 3x10 reps HEP   SL clams H5 2x10 reps    Standing HR/ hip abd H5 2x10 reps  HEP with brace   Standing TKE N.V     Prone TKE H5 2x10 reps    Prone hip ext/Prone knee flexion H5 2x10 reps HEP   Manual Intervention (30267)            Patellar mobilizations 5'     Access Code: [email or web address]                          NMR re-education (13633)   CUES NEEDED   SB bridges H5 2x10 reps    SLS R LE H10 3 reps Brace on + to HEP   [place] ES with QS/SLR H10/10 10' 5' each exercise                                       Therapeutic Activity (80850)            Gait training with brace/crutches                                HEP instruction: (see scanned forms)  Access Code: [email or web address]  URL: [email or web address]  Date: 05/11/2022  Prepared by: [place] Polo     Exercises  Long Sitting Ankle Pumps - 5-7 x daily - 7 x weekly - 3 sets - 10 reps - 1-2 hold  Long Sitting Quad Set - 5-7 x daily - 7 x weekly - 1 sets - 10 reps - 10 hold  Long Sitting Calf Stretch with Strap - 2 x daily - 7 x weekly - 1 sets - 5 reps - 30\" hold  Seated Table Hamstring Stretch - 2 x daily - 7 x weekly - 1 sets - 5 reps - 30\" hold  Supine Straight Leg Raises - 2 x daily - 7 x weekly - 2 sets - 10 reps  Sitting Heel Slide with Towel - 2 x daily - 7 x weekly - 1 sets - 10 reps - 10\" hold    Access Code: [email or web address]  URL: [email or web address]/  Date: 05/23/2022  Prepared by: [place] Polo    Exercises ; WB exercises with brace on. Standing Hip Abduction with Counter Support - 1 x daily - 7 x weekly - 2-3 sets - 10 reps - 2 hold  Heel Toe Raises with Counter Support - 1 x daily - 7 x weekly - 2-3 sets - 10 reps  Prone Hip Extension - 2 x daily - 7 x weekly - 10 reps - 2-3 sets - 5 hold  Seated Long Arc Quad - 2 x daily - 7 x weekly - 10 reps - 3 sets - 1 second hold  Side Leg Lifts - 2 x daily - 7 x weekly - 10 reps - 2 sets - 5\" hold  Supine Heel Slide with Strap - 2 x daily - 7 x weekly - 10 reps - 3 sets - 10\" hold     Therapeutic Exercise and NMR EXR  [x] (99633) Provided verbal/tactile cueing for activities related to strengthening, flexibility, endurance, ROM for improvements in LE, proximal hip, and core control with self care, mobility, lifting, ambulation.  [] (88584) Provided verbal/tactile cueing for activities related to improving balance, coordination, kinesthetic sense, posture, motor skill, proprioception  to assist with LE, proximal hip, and core control in self care, mobility, lifting, ambulation and eccentric single leg control.      NMR and Therapeutic Activities:    [x] (77879 or 55643) Provided verbal/tactile cueing for activities related to improving balance, coordination, kinesthetic sense, posture, motor skill, proprioception and motor activation to allow for proper function of core, proximal hip and LE with self care and ADLs  [] (49334) Gait Re-education- Provided training and instruction to the patient for proper LE, core and proximal hip recruitment and positioning and eccentric body weight control with ambulation re-education including up and down stairs     Home Exercise Program:    [x] (19253) Reviewed/Progressed HEP activities related to strengthening, flexibility, endurance, ROM of core, proximal hip and LE for functional self-care, mobility, lifting and ambulation/stair navigation   [] (56980)Reviewed/Progressed HEP activities related to improving balance, coordination, kinesthetic sense, posture, motor skill, proprioception of core, proximal hip and LE for self care, mobility, lifting, and ambulation/stair navigation      Manual Treatments:  PROM / STM / Oscillations-Mobs:  G-I, II, III, IV (PA's, Inf., Post.)  [x] (92969) Provided manual therapy to mobilize LE, proximal hip and/or LS spine soft tissue/joints for the purpose of modulating pain, promoting relaxation,  increasing ROM, reducing/eliminating soft tissue swelling/inflammation/restriction, improving soft tissue extensibility and allowing for proper ROM for normal function with self care, mobility, lifting and ambulation. Modalities:  Patient deferred due to time constraint. [] GAME READY (VASO)- for significant edema, swelling, pain control. Charges  Timed Code Treatment Minutes: 40'   Total Treatment Minutes: 48'         [] EVAL (LOW) 455 1011   [] EVAL (MOD) 80638  [] EVAL (HIGH) 99351   [] RE-EVAL     [x] EJ(41357) x2    [] IONTO  [x] NMR (08202) x   1  [] VASO  [] Manual (15846) x      [] Other:  [] TA x      [] Mech Traction (83242)  [] ES(attended) (51613)      [x] ES (un) (51575):         GOALS:  Patient stated goal: return to walking  []? Progressing: []? Met: []?  Not Met: []? Adjusted     Therapist goals for Patient:   Short Term Goals: To be achieved in: 2 weeks  1. Independent in HEP and progression per patient tolerance, in order to prevent re-injury. []? Progressing: []? Met: []? Not Met: []? Adjusted  2. Patient will have a decrease in pain to facilitate improvement in movement, function, and ADLs as indicated by Functional Deficits. []? Progressing: []? Met: []? Not Met: []? Adjusted     Long Term Goals: To be achieved in: 12 weeks  1. Disability index score of 69% or more per FOTO to assist with reaching prior level of function. []? Progressing: []? Met: []? Not Met: []? Adjusted  2. Patient will demonstrate increased AROM to 0-130 R knee or better to allow for proper joint functioning as indicated by patients Functional Deficits. []? Progressing: []? Met: []? Not Met: []? Adjusted  3. Patient will demonstrate an increase in Strength to good proximal hip strength and control, within 5lb HHD in LE to allow for proper functional mobility as indicated by patients Functional Deficits. []? Progressing: []? Met: []? Not Met: []? Adjusted  4. Patient will return to walking with assistive device with normalize gait pattern functional activities without increased symptoms or restriction. []? Progressing: []? Met: []? Not Met: []? Adjusted  5. Reciprocal gait with stairs (patient specific functional goal)    []? Progressing: []? Met: []? Not Met: []? Adjusted       Progression Towards Functional goals:  [] Patient is progressing as expected towards functional goals listed. [] Progression is slowed due to complexities listed. [] Progression has been slowed due to co-morbidities. [x] Plan just implemented, too soon to assess goals progression  [] Other:         Overall Progression Towards Functional goals/ Treatment Progress Update:  [] Patient is progressing as expected towards functional goals listed. [] Progression is slowed due to complexities/Impairments listed.   [] Progression has been slowed due to co-morbidities. [x] Plan just implemented, too soon to assess goals progression <30days   [] Goals require adjustment due to lack of progress  [] Patient is not progressing as expected and requires additional follow up with physician  [] Other    Prognosis for POC: [x] Good [] Fair  [] Poor      Patient requires continued skilled intervention: [x] Yes  [] No    Treatment/Activity Tolerance:  [x] Patient able to complete treatment  [] Patient limited by fatigue  [] Patient limited by pain    [] Patient limited by other medical complications  [] Other:     ASSESSMENT: patient presents with R knee pain, limited ROM, strength deficits, gait impairments as expected post-surgical procedure. Increased knee ext/knee flex noted this visit. No issues with walking with brace unlocked and 1 crutch. Increased  ROM noted this visit with knee flexion. Assess response to new exercises. Pt requires skilled intervention to restore ROM, strength, functional endurance and balance in order to perform ADLs without significant symptoms or limitations. Return to Play: (if applicable)   []  Stage 1: Intro to Strength   []  Stage 2: Return to Run and Strength   []  Stage 3: Return to Jump and Strength   []  Stage 4: Dynamic Strength and Agility   []  Stage 5: Sport Specific Training     []  Ready to Return to Play, Meets All Above Stages   []  Not Ready for Return to Sports   Comments:                               PLAN: See eval. Progress per protocol for meniscus repair. [x] Continue per plan of care [] Alter current plan (see comments above)  [] Plan of care initiated [] Hold pending MD visit [] Discharge      Electronically signed by:  Jocelyne Rachel, [address]    Note: If patient does not return for scheduled/ recommended follow up visits, this note will serve as a discharge from care along with most recent update on progress. Anesthesia Type: 1% lidocaine with 1:200,000 epinephrine and a 1:10 solution of 8.4% sodium bicarbonate

## 2022-05-26 ENCOUNTER — OFFICE VISIT (OUTPATIENT)
Dept: ENDOCRINOLOGY | Age: 37
End: 2022-05-26
Payer: COMMERCIAL

## 2022-05-26 ENCOUNTER — HOSPITAL ENCOUNTER (OUTPATIENT)
Age: 37
Discharge: HOME | End: 2022-05-26
Payer: COMMERCIAL

## 2022-05-26 VITALS
BODY MASS INDEX: 23.63 KG/M2 | HEART RATE: 81 BPM | DIASTOLIC BLOOD PRESSURE: 71 MMHG | SYSTOLIC BLOOD PRESSURE: 110 MMHG | WEIGHT: 142 LBS

## 2022-05-26 DIAGNOSIS — E06.3 SELF-LIMITING AUTOIMMUNE THYROIDITIS WITH TRANSIENT HYPERTHYROIDISM AND/OR HYPOTHYROIDISM: Primary | ICD-10-CM

## 2022-05-26 DIAGNOSIS — E06.3 SELF-LIMITING AUTOIMMUNE THYROIDITIS WITH TRANSIENT HYPERTHYROIDISM AND/OR HYPOTHYROIDISM: ICD-10-CM

## 2022-05-26 DIAGNOSIS — F43.22 ADJUSTMENT DISORDER WITH ANXIOUS MOOD: ICD-10-CM

## 2022-05-26 LAB
T3 TOTAL: 1.19 NG/ML (ref 0.8–2)
T4 FREE: 1.2 NG/DL (ref 0.9–1.8)
TSH SERPL DL<=0.05 MIU/L-ACNC: 0.26 UIU/ML (ref 0.27–4.2)

## 2022-05-26 PROCEDURE — 36415 COLL VENOUS BLD VENIPUNCTURE: CPT

## 2022-05-26 PROCEDURE — 83520 IMMUNOASSAY QUANT NOS NONAB: CPT

## 2022-05-26 PROCEDURE — 84439 ASSAY OF FREE THYROXINE: CPT

## 2022-05-26 PROCEDURE — 84480 ASSAY TRIIODOTHYRONINE (T3): CPT

## 2022-05-26 PROCEDURE — G8427 DOCREV CUR MEDS BY ELIG CLIN: HCPCS | Performed by: INTERNAL MEDICINE

## 2022-05-26 PROCEDURE — 84443 ASSAY THYROID STIM HORMONE: CPT

## 2022-05-26 PROCEDURE — 99203 OFFICE O/P NEW LOW 30 MIN: CPT | Performed by: INTERNAL MEDICINE

## 2022-05-26 PROCEDURE — G8420 CALC BMI NORM PARAMETERS: HCPCS | Performed by: INTERNAL MEDICINE

## 2022-05-26 RX ORDER — PANTOPRAZOLE SODIUM 40 MG/1
40 TABLET, DELAYED RELEASE ORAL DAILY
COMMUNITY

## 2022-05-26 NOTE — PATIENT INSTRUCTIONS
Patient Education     methimazole  Pronunciation: riley duffy zole  Brand: Tapazole  What is the most important information I should know about methimazole? You should not breast-feed while using this medicine. What is methimazole? Methimazole prevents the thyroid gland from producing too much thyroid hormone. Methimazole is used to treat hyperthyroidism (overactive thyroid). It is alsoused before thyroid surgery or radioactive iodine treatment. Methimazole may also be used for purposes not listed in this medication guide. What should I discuss with my healthcare provider before taking methimazole? You should not use methimazole if you are allergic to it, or:   if you are pregnant or breast-feeding. To make sure methimazole is safe for you, tell your doctor if you have:   liver disease;   a blood cell disorder; or   a weak immune system. Using methimazole during pregnancy could harm the unborn baby. Tell your doctorif you are pregnant or if you become pregnant while using this medicine. Methimazole can pass into breast milk and may harm a nursing baby. You should not breast-feed while using this medicine. Do not give this medicine to a child without medical advice. How should I take methimazole? Follow all directions on your prescription label. Your doctor may occasionally change your dose. Do not use this medicine in larger or smaller amounts or forlonger than recommended. Methimazole is usually taken every 8 hours. Take your doses at regularintervals to keep a steady amount of the drug in your body at all times. If a child is using this medicine,  tell your doctor if the child has any changes in weight. Methimazole doses arebased on weight in children, and any changes may affect your child's dose. Methimazole can lower blood cells that help your body fight infections and help your blood to clot.  This can make it easier for you to bleed from an injury or get sick from being around others who are ill. Your blood may need to be testedoften. Use methimazole regularly to get the most benefit, even if you feel fine or have no symptoms of hyperthyroidism. Get your prescription refilled before yourun out of medicine completely. If you need surgery, tell the surgeon ahead of time that you are using methimazole. Store at room temperature away from moisture and heat. What happens if I miss a dose? Take the missed dose as soon as you remember. Skip the missed dose if it is almost time for your next scheduled dose. Do not take extra medicine to make up the missed dose. What happens if I overdose? Seek emergency medical attention or call the Poison Help line at 1-234.371.1050. Overdose symptoms may include nausea, vomiting, upset stomach, headache, jointpain, fever, itching, swelling, or pale skin and easy bruising or bleeding. What should I avoid while taking methimazole? Avoid being near people who are sick or have infections. Tell your doctor atonce if you develop signs of infection. What are the possible side effects of methimazole? Get emergency medical help if you have signs of an allergic reaction: hives; difficult breathing; swelling of your face, lips, tongue, or throat. Serious and sometimes fatal infections may occur during treatment with methimazole. Stop using this medicine and call your doctor right away if you have signs of infection such as:   sudden weakness or ill feeling, fever, chills, sore throat, cold or flu symptoms;   painful mouth sores, pain when swallowing, red or swollen gums; or   pale skin, easy bruising, unusual bleeding. Call your doctor at once if you have:   swollen glands in your neck or jaw; or   liver problems --nausea, upper stomach pain, itching, tiredness, loss of appetite, dark urine, josé miguel-colored stools, jaundice (yellowing of the skin or eyes).   Common side effects may include:   nausea, vomiting, upset stomach;   headache, dizziness, drowsiness;   numbness or tingly feeling;   rash, itching, skin discoloration;   muscle or joint pain;   hair loss; or   decreased sense of taste. This is not a complete list of side effects and others may occur. Call your doctor for medical advice about side effects. You may report side effects toFDA at 3-631-WKB-1093. What other drugs will affect methimazole? Tell your doctor about all your current medicines and any you start or stopusing, especially:   digoxin, digitalis, theophylline;   a blood thinner --warfarin, Coumadin, Jantoven; or   a beta blocker --atenolol, carvedilol, labetalol, metoprolol, nadolol, propranolol, sotalol, and others. This list is not complete. Other drugs may interact with methimazole, including prescription and over-the-counter medicines, vitamins, and herbal products. Notall possible interactions are listed in this medication guide. Where can I get more information? Your pharmacist can provide more information about methimazole. Remember, keep this and all other medicines out of the reach of children, never share your medicines with others, and use this medication only for the indication prescribed. Every effort has been made to ensure that the information provided by [address] is accurate, up-to-date, and complete, but no guarantee is made to that effect. Drug information contained herein may be time sensitive. [employer] information has been compiled for use by healthcare practitioners and consumers in the [place] and therefore [employer] does not warrant that uses outside of the [place] are appropriate, unless specifically indicated otherwise. [facility] [employer] drug information does not endorse drugs, diagnose patients or recommend therapy.  [employer] drug information is an informational resource designed to assist licensed healthcare practitioners in caring for their patients and/or to serve consumers viewing this service as a supplement to, and not a substitute for, the expertise, skill, knowledge and judgment of healthcare practitioners. The absence of a warning for a given drug or drug combination in no way should be construed to indicate that the drug or drug combination is safe, effective or appropriate for any given patient. [facility] does not assume any responsibility for any aspect of healthcare administered with the aid of information [facility] provides. The information contained herein is not intended to cover all possible uses, directions, precautions, warnings, drug interactions, allergic reactions, or adverse effects. If you have questions about the drugs you are taking, check with yourdoctor, nurse or pharmacist.  Copyright 0360-1244 [facility] [place]. Version: 4.01. Revision date: 3/2/2017. Care instructions adapted under license by [facility] ([facility]). If you have questions about a medical condition or this instruction, always ask your healthcare professional. [place] [address] any warranty or liability for your use of this information. Patient Education        levothyroxine (oral/injection)  Pronunciation: LIZY norwood  Brand: Euthyrox, Levoxyl, Synthroid, Thyquidity, Tirosint, Tirosint-Sol, Unithroid  What is the most important information I should know about levothyroxine? You may not be able to take levothyroxine if you have certain medical conditions. Tell your doctor if you have an untreated or uncontrolled adrenal gland disorder, a thyroid disorder called thyrotoxicosis, or if you have any recentor current symptoms of a heart attack. Levothyroxine should not be used to treat obesity or weight problems. What is levothyroxine? Levothyroxine is used to treat hypothyroidism (low thyroid hormone). Levothyroxine is given when your thyroid does not produce enough of thishormone on its own.   Levothyroxine is also used to treat or prevent goiter (enlarged thyroid gland), which can be caused by hormone imbalances, radiation treatment, surgery, orcancer. There are many brands and forms of levothyroxine available. Not all brands arelisted on this leaflet. Levothyroxine may also be used for purposes not listed in this medication guide. What should I discuss with my healthcare provider before taking levothyroxine? Levothyroxine should not be used to treat obesity or weight problems. Dangerous side effects or death can occur from the misuse of levothyroxine, especially if you are taking any other weight-loss medications or appetitesuppressants. Since thyroid hormone occurs naturally in the body, almost anyone can take levothyroxine. However, you may not be able to take this medicine if you have certain medical conditions. Tell your doctor if you have:   an untreated or uncontrolled adrenal gland disorder;   a thyroid disorder called thyrotoxicosis; or   symptoms of a heart attack (chest pain or heavy feeling, pain spreading to the jaw or shoulder, nausea, sweating, general ill feeling). Tell your doctor if you have ever had:   a thyroid nodule;   heart disease, a blood clot, or a blood-clotting disorder;   diabetes (insulin or oral diabetes medication doses may need to be changed when you start taking levothyroxine);   kidney disease;   anemia (lack of red blood cells);   osteoporosis, or low bone mineral density;   problems with your pituitary gland; or   any food or drug allergies. Tell your doctor if you have recently received radiation therapy with iodine(such as I-131). If you become pregnant while taking levothyroxine, do not stop taking the medicine without your doctor's advice. Having low thyroid hormone levels during pregnancy could harm both mother and baby. Your dose needs may be different during pregnancy. Tell your doctor if you are breastfeeding. Your dose needs may be different while you are nursing. Do not give this medicine to a child without medical advice.  Tirosint is not approved for use by anyone younger than 10years old. How should I take levothyroxine? Follow all directions on your prescription label and read all medication guides or instruction sheets. Your doctor may occasionally change your dose. Use themedicine exactly as directed. Levothyroxine oral is taken by mouth. Levothyroxine injection is given as an infusion into a vein. Levothyroxine is usually given byinjection only if you are unable to take the medicine by mouth. Levothyroxine oral works best if you take it on an empty stomach, 30 to 60 minutes before breakfast. Follow your doctor's dosing instructions and try to take themedicine at the same time each day. Swallow the tablet or capsule whole, with a full glass (8 ounces) of water. The levothyroxine tablet maydissolve very quickly and could swell in your throat. Measure liquid medicine carefully. Use the dosing syringe provided, or use a medicine dose-measuringdevice (not a kitchen spoon). Levothyroxine doses are based on weight in children. Your child's dose needsmay change if the child gains or loses weight. It may take several weeks before your body starts to respond to levothyroxine. Keep using this medicine even if you feel well. You may need to use levothyroxine for the rest of your life. You may need frequent medical tests. Tell any doctor, dentist, or surgeon who treats you that you are using levothyroxine. Store at room temperature away from moisture and heat. Do not share this medicine with another person, even if they have the same symptoms you have. What happens if I miss a dose? Take the medicine as soon as you can, but skip the missed dose if it is almost time for your next dose. Do not take two doses at one time. What happens if I overdose? Seek emergency medical attention or call the Poison Help line at [phone].   Overdose symptoms may include headache, leg cramps, tremors, feeling nervous orirritable, chest pain, shortness of breath, and fast or pounding heartbeats. What should I avoid while taking levothyroxine? Avoid the following food products, which can make your body absorb less levothyroxine:  grapefruit juice, infant soy formula, soybean flour, cotton seed meal,walnuts, and high-fiber foods. What are the possible side effects of levothyroxine? Get emergency medical help if you have signs of an allergic reaction: hives; difficult breathing; swelling of your face, lips, tongue, or throat. Call your doctor at once if you have:   fast or irregular heartbeats;   chest pain, pain spreading to your jaw or shoulder;   shortness of breath;   fever, hot flashes, sweating;   tremors, or if you feel unusually cold;   weakness, tiredness, sleep problems (insomnia);   memory problems, feeling depressed or irritable;   headache, leg cramps, muscle aches;   feeling nervous or irritable;   dryness of your skin or hair, hair loss;   irregular menstrual periods; or   vomiting, diarrhea, appetite changes, weight changes. Certain side effects may be more likely in older adults. Common side effects may include:   chest pain, irregular heartbeats;   shortness of breath;   headache, leg cramps, muscle pain or weakness;   tremors, feeling nervous or irritable, trouble sleeping;   increased appetite;   feeling hot;   weight loss;   changes in your menstrual periods;   diarrhea; or   skin rash, partial hair loss. This is not a complete list of side effects and others may occur. Call your doctor for medical advice about side effects. You may report side effects toFDA at 2-300-YEI-8457. What other drugs will affect levothyroxine? Many other medicines can be affected by your thyroid hormone levels. Certainother medicines may also increase or decrease the effects of levothyroxine.   If you use any of the following drugs, avoid taking them within 4 hours before or 4 hours after you take levothyroxine:    calcium carbonate (Shanta-Mints, Caltrate, Os-Thor, Oyster Shell Calcium, Rolaids Soft Chew, Tums, and others);   cholestyramine, colesevelam, colestipol;   ferrous sulfate iron supplement;   sucralfate;   sodium polystyrene sulfonate (Kalexate, Kayexalate, Kionex);   stomach acid reducers --esomeprazole, lansoprazole, omeprazole, rabeprazole, Nexium, Prilosec, Prevacid, Protonix, Zegerid, and others; or   antacids that contain aluminum or magnesium --Gaviscon, Maalox, Milk of Magnesia, Mintox, Mylanta, Pepcid Complete, and others. Many drugs can affect levothyroxine. This includes prescription and over-the-counter medicines, vitamins, and herbal products. Not all possible interactions are listed here. Tell your doctor about all your current medicines and any medicine you startor stop using. Where can I get more information? Your doctor or pharmacist can provide more information about levothyroxine. Remember, keep this and all other medicines out of the reach of children, never share your medicines with others, and use this medication only for the indication prescribed. Every effort has been made to ensure that the information provided by [address] is accurate, up-to-date, and complete, but no guarantee is made to that effect. Drug information contained herein may be time sensitive. [employer] information has been compiled for use by healthcare practitioners and consumers in the [place] and therefore [employer] does not warrant that uses outside of the [place] are appropriate, unless specifically indicated otherwise. [facility] drug information does not endorse drugs, diagnose patients or recommend therapy. [employer] drug information is an informational resource designed to assist licensed healthcare practitioners in caring for their patients and/or to serve consumers viewing this service as a supplement to, and not a substitute for, the expertise, skill, knowledge and judgment of healthcare practitioners.  The absence of a warning for a given drug or drug combination in no way should be construed to indicate that the drug or drug combination is safe, effective or appropriate for any given patient. [facility] does not assume any responsibility for any aspect of healthcare administered with the aid of information [facility] provides. The information contained herein is not intended to cover all possible uses, directions, precautions, warnings, drug interactions, allergic reactions, or adverse effects. If you have questions about the drugs you are taking, check with yourdoctor, nurse or pharmacist.  Copyright 6262-5247 [address]. Version: 14.02. Revision date: 4/9/2021. Care instructions adapted under license by [facility] [address] If you have questions about a medical condition or this instruction, always ask your healthcare professional. [place] [address] any warranty or liability for your use of this information.

## 2022-05-26 NOTE — PROGRESS NOTES
HerSUBJECTIVE:  Skylar Vail is a 29 y.o. female who is referred here for supressed TSH . Patient started having weight loss since Oct 2021 she lost 30 lbs in 1.5 months she also had excessive sweating , she also noted worsening anxiety. Possible Covid prior to these symptoms   Her weight was down to 110 lbs and now she has gained  her weight back. But her anxiety irritability and heat intolerance are still there. In October 2021 she was noted to have suppressed TSH with elevated free T4 her thyroid receptor antibodies and thyroid-stimulating immunoglobulins were all elevated on repeat labs in March the levels of thyroid-stimulating immunoglobulins and thyroid scepter antibodies were reduced but still elevated. Thyroid function test done more recently in April 2022 shows a TSH of 0.3 with a low free T4. Formerly [facility], [facility] Symptoms at presentation were weight loss, anxiety , irritability and sweating. current complaints: anxiousness, palpitations, sweating, weight loss, heat intolerance  History of obstructive symptoms: difficulty swallowing No, changes in voice/hoarseness No.    History of radiation to patient's neck: NO  Recent iodine exposure: No  Family history includes no thyroid abnormalities. Family history of thyroid cancer: No    She has GERD and is on protonix. She tore her meniscus in her rt knee and is on crutches.             Past Medical History:   Diagnosis Date    Migraine     Thyroid disease     Ureteral reflux      Patient Active Problem List    Diagnosis Date Noted    Weight loss 01/08/2022    Dysphagia 01/08/2022    History of hepatitis C 01/08/2022    Adjustment disorder with anxious mood 01/08/2022     Past Surgical History:   Procedure Laterality Date    ANTERIOR CRUCIATE LIGAMENT REPAIR Right     BLADDER SURGERY      for reflux    KNEE ARTHROSCOPY Right 5/6/2022    RIGHT KNEE EXAM UNDER ANESTHESIA WITH ARTHROSCOPIC MEDIAL MENISCUS REPAIR [facility]    MENISCECTOMY Right     URETER STENT PLACEMENT       Family History   Problem Relation Age of Onset    Heart Disease Mother     No Known Problems Father     No Known Problems Sister     No Known Problems Brother     No Known Problems Maternal Grandmother     No Known Problems Maternal Grandfather     No Known Problems Paternal Grandmother     No Known Problems Paternal Grandfather     No Known Problems Daughter     Heart Defect Daughter     Heart Defect Daughter      Social History     Socioeconomic History    Marital status: Single     Spouse name: None    Number of children: None    Years of education: None    Highest education level: None   Occupational History    None   Tobacco Use    Smoking status: Former Smoker     Packs/day: 0.50     Types: Cigarettes     Quit date: 2019     Years since quittin.6    Smokeless tobacco: Current User    Tobacco comment: Vaps   Vaping Use    Vaping Use: Every day    Substances: Nicotine   Substance and Sexual Activity    Alcohol use: Yes     Comment: social    Drug use: Yes     Frequency: 2.0 times per week     Types: Marijuana (Weed)     Comment: smoked last night    Sexual activity: Yes     Partners: Male   Other Topics Concern    None   Social History Narrative    None     Social Determinants of Health     Financial Resource Strain: Low Risk     Difficulty of Paying Living Expenses: Not hard at all   Food Insecurity: No Food Insecurity    Worried About Running Out of Food in the Last Year: Never true    Lona of Food in the Last Year: Never true   Transportation Needs:     Lack of Transportation (Medical): Not on file    Lack of Transportation (Non-Medical):  Not on file   Physical Activity:     Days of Exercise per Week: Not on file    Minutes of Exercise per Session: Not on file   Stress:     Feeling of Stress : Not on file   Social Connections:     Frequency of Communication with Friends and Family: Not on file    Frequency of Social Gatherings with Friends and Family: Not on file    Attends Orthodoxy Services: Not on file    Active Member of Clubs or Organizations: Not on file    Attends Club or Organization Meetings: Not on file    Marital Status: Not on file   Intimate Partner Violence:     Fear of Current or Ex-Partner: Not on file    Emotionally Abused: Not on file    Physically Abused: Not on file    Sexually Abused: Not on file   Housing Stability:     Unable to Pay for Housing in the Last Year: Not on file    Number of [place] in the Last Year: Not on file    Unstable Housing in the Last Year: Not on file     Current Outpatient Medications   Medication Sig Dispense Refill    pantoprazole (PROTONIX) 40 MG tablet Take 40 mg by mouth daily      ARIPiprazole (ABILIFY) 5 MG tablet Take 1 tablet by mouth daily 30 tablet 3    escitalopram (LEXAPRO) 20 MG tablet Take 1 tablet by mouth daily 30 tablet 2    SUMAtriptan (IMITREX) 25 MG tablet Take 1 tablet by mouth once as needed for Migraine May repeat in 2 hours if not improving- max daily 200mg. 20 tablet 2    aspirin 325 MG EC tablet Take 1 tablet by mouth daily for 14 days 14 tablet 0     No current facility-administered medications for this visit. Allergies   Allergen Reactions    Keflex [Cephalexin] Rash         Review of Systems:  I have reviewed the review of system questionnaire filled by the patient .   Patient was advised to contact PCP for non endocrine signs and symptoms       OBJECTIVE:   /71   Pulse 81   Wt 142 lb (64.4 kg)   LMP 04/28/2022   BMI 23.63 kg/m²   Wt Readings from Last 3 Encounters:   05/26/22 142 lb (64.4 kg)   05/17/22 140 lb (63.5 kg)   05/08/22 140 lb (63.5 kg)       Physical Exam:  Constitutional: no acute distress, well appearing, well nourished  Psychiatric: oriented to person, place and time, judgement, insight and normal, recent and remote memory and intact and mood, affect are normal  Skin: skin and subcutaneous tissue is normal without mass, normal turgor  Head and Face: examination of head and face revealed no abnormalities  Eyes: no lid or conjunctival swelling, no erythema or discharge, pupils are normal, equal, round. Ears/Nose: external inspection of ears and nose revealed no abnormalities, hearing is grossly normal  Oropharynx/Mouth/Face: lips, tongue and gums are normal with no lesions, the voice quality was normal  Neck: neck is supple and symmetric, with midline trachea and no masses, thyroid is enlarged  Lymphatics: normal cervical lymph nodes, normal supraclavicular nodes  Pulmonary: no increased work of breathing or signs of respiratory distress, lungs are clear to auscultation  Cardiovascular: normal heart rate and rhythm, normal S1 and S2, no murmurs   Abdomen: abdomen is soft, non-tender with no masses  Musculoskeletal: normal gait and station. Neurological: normal coordination, normal general cortical function    Lab Review:  Lab Results   Component Value Date    TSH 0.26 05/26/2022    TSH 0.32 04/19/2022    TSH <0.01 01/05/2022     Lab Results   Component Value Date    T4FREE 1.2 05/26/2022        ASSESSMENT/PLAN:      ---Self-limiting autoimmune thyroiditis with transient hyperthyroidism and/or hypothyroidism  Patient appears to have overactive phase back in October 2021 with her TSH being suppressed to less than 0.1 and her free T4 was elevated at 2.8. A few weeks prior to this patient did had COVID infection. Her repeat labs in April 2022 shows a free T4 of 0.8 and a TSH of 0.32. Her weight is above her baseline weight she has gained 20 pounds since October. Her anxiety and heat intolerance are still there. I am not sure if her condition got worse due to the concomitant COVID infection. We will check thyroid function test today and if still hyperthyroid we will treat with Tapazole. Otherwise we will monitor the levels and treat her once she becomes hyper or hypothyroid.   Both TPO, antithyroglobulin antibodies, TSI and trap antibodies were all positive. TSI was 31 in January 2022 came down to 4.7 in April 2022, whereas thyroid receptor antibodies were 25 in January 2022 came down to 5.23 in April 2022. Even her TPO antibodies dropped from 314 2-35 in April 2022. Late entry June 1, 2022 thyroid receptor antibodies are now down to 2.45 in May 2022, T3 was normal at 1.19, free T4 was normal at 1.2, TSH was 0.26 which is slightly suppressed. Since her free thyroid hormone levels are normal I would recommend continuing to monitor her thyroid function test rather than starting her on medication.    - TSH; Future  - T4, Free; Future  - T3; Future  - Thyrotropin receptor antibody; Future  - TSH; Future  - T4, Free; Future  - T3; Future  - TSH; Future  - T4, Free; Future  - T3; Future    Anxiety/depression  She is on multiple psych medication as managed by primary care physician. These medications have been adjusted. Reviewed and/or ordered clinical lab results Yes  Reviewed and/or ordered radiology tests Yes   Reviewed and/or ordered other diagnostic tests Yes  Made a decision to obtain old records Yes  Reviewed and summarized old records Yes      Navdeep Ortiz was counseled regarding symptoms of current diagnosis, course and complications of disease if inadequately treated, side effects of medications, diagnosis, treatment options, and prognosis, risks, benefits, complications, and alternatives of treatment, labs, imaging and other studies and treatment targets and goals. She understands instructions and counseling. Return in about 3 months (around 8/26/2022). Please note that some or all of this report was generated using voice recognition software. Please notify me in case of any questions about the content of this document, as some errors in transcription may have occurred .

## 2022-05-28 LAB — TSH RECEPTOR AB: 2.45 IU/L

## 2022-06-01 ENCOUNTER — HOSPITAL ENCOUNTER (OUTPATIENT)
Dept: PHYSICAL THERAPY | Age: 37
Setting detail: THERAPIES SERIES
Discharge: HOME | End: 2022-06-01
Payer: COMMERCIAL

## 2022-06-01 PROCEDURE — 97112 NEUROMUSCULAR REEDUCATION: CPT

## 2022-06-01 PROCEDURE — 97110 THERAPEUTIC EXERCISES: CPT

## 2022-06-01 PROCEDURE — 97032 APPL MODALITY 1+ESTIM EA 15: CPT

## 2022-06-01 NOTE — FLOWSHEET NOTE
[facility], [address] [place] [address], [address]  Phone: [phone]   Fax: [phone]    Physical Therapy Treatment Note/ Progress Report:           Date:  2022    Patient Name:  Marycruz Street    :  1985  MRN: [id number]  Restrictions/Precautions:  50% WB Brace locked in full extension for WB 0-2 weeks, 0-90 ROM in NWB  Medical/Treatment Diagnosis Information:  · Diagnosis: S83.211A (ICD-10-CM) - Bucket-handle tear of medial meniscus of right knee as current injury, initial encounter  · Treatment Diagnosis: 83.211D (ICD-10-CM) - Bucket-handle tear of medial meniscus of right knee as current injury, R knee pain M25.561, R knee effusion M25.461 s/p MM Repair on 22.   Insurance/Certification information:  PT Insurance Information: [email or web address] 30 visit / Dicky Manners after 30       Physician Information:   Denise Haskins   Has the plan of care been signed (Y/N):        []  Yes  [x]  No     Date of Patient follow up with Physician: 22      Is this a Progress Report:     []  Yes  [x]  No        If Yes:  Date Range for reporting period:  Beginnin22  Endin/10/22    Progress report will be due (10 Rx or 30 days whichever is less): 3/34/48       Recertification will be due (POC Duration  / 90 days whichever is less): 12 weeks      Visit # Insurance Allowable Auth Required   In-person 5 30 visit / Dicky Manners after 30      []  Yes []  No    Telehealth   []  Yes []  No    Total          Functional Scale: FOTO: 43/100    Date assessed:  22      Therapy Diagnosis/Practice Pattern:4I      Number of Comorbidities:  []0     [x]1-2    []3    Latex Allergy:  [x]NO      []YES  Preferred Language for Healthcare:   [x]English       []other:      Pain level:  0-2/10     SUBJECTIVE:  Reports this past week her life is more settled and she is comfortable with adjusting brace and is compliant with wearing it with walking. No issues with new exercises but when asked to demonstrate a particular exercise she said she had not been doing that particular one at home. OBJECTIVE:    Re-emphasized importance of compliance with brace and post-op restrictions and precautions. Reviewed no squatting in WB is permitted >37.  Observation:    Test measurements:  85-90 knee flexion. -2 knee ext. OBJECTIVE  Test used 5/11/22 Current Score   Pain Summary  2-5/10 0/10   Functional questionnaire FOTO 43%    Functional Testing MP girth L/R 36/36.8cm     Knee flex 85-90 122 this visit   ROM Knee ext -5 0         Strength Quad tone 3+/5           RESTRICTIONS/PRECAUTIONS:  2-6 weeks post-op 0-90 with brace. D/C crutches after 4 weeks. Brace d/c after 6 weeks. ROM WFL as tolerated in NWB  No WB with flexion >90. ( 8 weeks) Previous ACL/ MMR  May 2021 from Dr. Adelita Huerta. See scanned in protocol in media.   Exercises/Interventions:   3 weeks 5 days  post-op 6/1/22    Therapeutic Ex (54313) Sets/sec Reps Notes/CUES   QS/AP with NMR H10 10 HEP   SLR with NMR H5 3x10 HEP   GS with strap h30 5 HEP   HS stretches in sitting H30 5 HEP   Heel slides  With strap H10 10 Instructed to do in brace at home   Knee ext propping with GS stretches and QS  4'          Sitting knee flexion off EOB SL hip abd H5 3x10 reps HEP   SL clams H5 2x10 reps    Standing HR/ hip abd H5 2x10 reps  HEP with brace   Standing TKE orange 2 x10    Prone TKE H5 2x10 reps    Prone hip ext/Prone knee flexion H5 2x10 reps HEP   Standing B Gastroc stretches H 20\" 2 B 6/1   Standing R Soleus Stretches H20\" 2 R 6/1   Manual Intervention (74560)            Patellar mobilizations 5'     Access Code: [email or web address]                          [facility] re-education (98257)   CUES NEEDED   SB bridges H5 2x10 reps    SLS R LE H10 3 reps Brace on + to HEP   [place] ES with QS/SLR H10/10 10' 5' each exercise ESTIM attended this date 6/1 with correction intermittent of SLR flexion with neutral Hip  (cues to decrease hip ADD pull in's with lift or hip IR)   Static Standing  Airex :  NBOS  EO  EC  Head nods  Head turns       20\"X2  20\" X2  1 x8 L/R  1 X8 L/R 6/1                                 Therapeutic Activity (66741)            Gait training with brace/crutches                  MANUAL      IASTM prone toR gastroc M/L HG7 X10 strokes superior and inferior X5'      Akk bolded items completed this date    HEP instruction: (see scanned forms)  Access Code: [email or web address]  URL: [email or web address]  Date: 05/11/2022  Prepared by: Dozier Shark     Exercises  Long Sitting Ankle Pumps - 5-7 x daily - 7 x weekly - 3 sets - 10 reps - 1-2 hold  Long Sitting Quad Set - 5-7 x daily - 7 x weekly - 1 sets - 10 reps - 10 hold  Long Sitting Calf Stretch with Strap - 2 x daily - 7 x weekly - 1 sets - 5 reps - 30\" hold  Seated Table Hamstring Stretch - 2 x daily - 7 x weekly - 1 sets - 5 reps - 30\" hold  Supine Straight Leg Raises - 2 x daily - 7 x weekly - 2 sets - 10 reps  Sitting Heel Slide with Towel - 2 x daily - 7 x weekly - 1 sets - 10 reps - 10\" hold    Access Code: [email or web address]  URL: [email or web address]/  Date: 05/23/2022  Prepared by: Dozier Shark    Exercises ; WB exercises with brace on.    Standing Hip Abduction with Counter Support - 1 x daily - 7 x weekly - 2-3 sets - 10 reps - 2 hold  Heel Toe Raises with Counter Support - 1 x daily - 7 x weekly - 2-3 sets - 10 reps  Prone Hip Extension - 2 x daily - 7 x weekly - 10 reps - 2-3 sets - 5 hold  Seated Long Arc Quad - 2 x daily - 7 x weekly - 10 reps - 3 sets - 1 second hold  Side Leg Lifts - 2 x daily - 7 x weekly - 10 reps - 2 sets - 5\" hold  Supine Heel Slide with Strap - 2 x daily - 7 x weekly - 10 reps - 3 sets - 10\" hold     Therapeutic Exercise and NMR EXR  [x] (49950) Provided verbal/tactile cueing for activities related to strengthening, flexibility, endurance, ROM for improvements in LE, proximal hip, and core control with self care, mobility, lifting, ambulation.  [] (36345) Provided verbal/tactile cueing for activities related to improving balance, coordination, kinesthetic sense, posture, motor skill, proprioception  to assist with LE, proximal hip, and core control in self care, mobility, lifting, ambulation and eccentric single leg control. NMR and Therapeutic Activities:    [x] (61491 or 14896) Provided verbal/tactile cueing for activities related to improving balance, coordination, kinesthetic sense, posture, motor skill, proprioception and motor activation to allow for proper function of core, proximal hip and LE with self care and ADLs  [] (14136) Gait Re-education- Provided training and instruction to the patient for proper LE, core and proximal hip recruitment and positioning and eccentric body weight control with ambulation re-education including up and down stairs     Home Exercise Program:    [x] (52650) Reviewed/Progressed HEP activities related to strengthening, flexibility, endurance, ROM of core, proximal hip and LE for functional self-care, mobility, lifting and ambulation/stair navigation   [] (28002)Reviewed/Progressed HEP activities related to improving balance, coordination, kinesthetic sense, posture, motor skill, proprioception of core, proximal hip and LE for self care, mobility, lifting, and ambulation/stair navigation      Manual Treatments:  PROM / STM / Oscillations-Mobs:  G-I, II, III, IV (PA's, Inf., Post.)  [x] (21774) Provided manual therapy to mobilize LE, proximal hip and/or LS spine soft tissue/joints for the purpose of modulating pain, promoting relaxation,  increasing ROM, reducing/eliminating soft tissue swelling/inflammation/restriction, improving soft tissue extensibility and allowing for proper ROM for normal function with self care, mobility, lifting and ambulation. Modalities:  Patient deferred due to time constraint. [] GAME READY (VASO)- for significant edema, swelling, pain control. Charges  Timed Code Treatment Minutes: 54'   Total Treatment Minutes: 54'         [] EVAL (LOW) 455 1011   [] EVAL (MOD) 33815  [] EVAL (HIGH) 36627   [] RE-EVAL     [x] BX(67828) x2    [] IONTO  [x] NMR (15939) x   1  [] VASO  [] Manual (00842) x      [] Other:  [] TA x      [] Mech Traction (25769)  [x] ES(attended) (32746)      [] ES (un) (15926):         GOALS:  Patient stated goal: return to walking  []? Progressing: []? Met: []? Not Met: []? Adjusted     Therapist goals for Patient:   Short Term Goals: To be achieved in: 2 weeks  1. Independent in HEP and progression per patient tolerance, in order to prevent re-injury. []? Progressing: []? Met: []? Not Met: []? Adjusted  2. Patient will have a decrease in pain to facilitate improvement in movement, function, and ADLs as indicated by Functional Deficits. []? Progressing: []? Met: []? Not Met: []? Adjusted     Long Term Goals: To be achieved in: 12 weeks  1. Disability index score of 69% or more per FOTO to assist with reaching prior level of function. []? Progressing: []? Met: []? Not Met: []? Adjusted  2. Patient will demonstrate increased AROM to 0-130 R knee or better to allow for proper joint functioning as indicated by patients Functional Deficits. []? Progressing: []? Met: []? Not Met: []? Adjusted  3. Patient will demonstrate an increase in Strength to good proximal hip strength and control, within 5lb HHD in LE to allow for proper functional mobility as indicated by patients Functional Deficits. []? Progressing: []? Met: []? Not Met: []? Adjusted  4. Patient will return to walking with assistive device with normalize gait pattern functional activities without increased symptoms or restriction. []? Progressing: []? Met: []? Not Met: []? Adjusted  5. Reciprocal gait with stairs (patient specific functional goal)    []? Progressing: []? Met: []? Not Met: []?  Adjusted       Progression Towards Functional goals:  [x] Patient is progressing as expected towards functional goals listed. [] Progression is slowed due to complexities listed. [] Progression has been slowed due to co-morbidities. [] Plan just implemented, too soon to assess goals progression  [] Other:         Overall Progression Towards Functional goals/ Treatment Progress Update:  [] Patient is progressing as expected towards functional goals listed. [] Progression is slowed due to complexities/Impairments listed. [] Progression has been slowed due to co-morbidities. [x] Plan just implemented, too soon to assess goals progression <30days   [] Goals require adjustment due to lack of progress  [] Patient is not progressing as expected and requires additional follow up with physician  [] Other    Prognosis for POC: [x] Good [] Fair  [] Poor      Patient requires continued skilled intervention: [x] Yes  [] No    Treatment/Activity Tolerance:  [x] Patient able to complete treatment  [] Patient limited by fatigue  [] Patient limited by pain    [] Patient limited by other medical complications  [] Other:     ASSESSMENT: patient presents with R knee pain, limited ROM, strength deficits, gait impairments as expected post-surgical procedure. . No issues with walking with brace unlocked and 1 crutch. Increased  ROM noted this visit with knee flexion. Patient needed more cues with standing balance and TKE this date to avoid R hip/femoral internal rotation and pelvic shifting during CKC L SLR AB exercises. Assess response to new exercises. Patient needs to be pogressed on next PT session to assess readiness for no crutches with brace on still. Pt requires skilled intervention to restore ROM, strength, functional endurance and balance in order to perform ADLs without significant symptoms or limitations.    Return to Play: (if applicable)   []  Stage 1: Intro to Strength   []  Stage 2: Return to Run and Strength   []  Stage 3: Return to Jump and Strength   []  Stage 4: Dynamic Strength and Agility   []  Stage 5: Sport Specific Training     []  Ready to Return to Play, Meets All Above Stages   []  Not Ready for Return to Sports   Comments:                               PLAN: See eval. Progress per protocol for meniscus repair. [x] Continue per plan of care [] Alter current plan (see comments above)  [] Plan of care initiated [] Hold pending MD visit [] Discharge      Electron[place] [id number]    Note: If patient does not return for scheduled/ recommended follow up visits, this note will serve as a discharge from care along with most recent update on progress.

## 2022-06-03 ENCOUNTER — HOSPITAL ENCOUNTER (OUTPATIENT)
Dept: PHYSICAL THERAPY | Age: 37
Setting detail: THERAPIES SERIES
Discharge: HOME | End: 2022-06-03
Payer: COMMERCIAL

## 2022-06-03 NOTE — FLOWSHEET NOTE
Physical Therapy  Cancellation/No-show Note  Patient Name:  Skylar Vail  :  1985   Date:  6/3/2022  Cancelled visits to date: 1  6/3  No-shows to date: 0    Patient status for today's appointment patient:  []  Cancelled  []  Rescheduled appointment  []  No-show     Reason given by patient:  []  Patient ill  []  Conflicting appointment  [x]  No transportation    []  Conflict with work  []  No reason given  []  Other:     Comments:      Phone call information:   []  Phone call made today to patient at _ time at number provided:      []  Patient answered, conversation as follows:    []  Patient did not answer, message left as follows:  []  Phone call not made today  [x]  Phone call not needed - pt contacted us to cancel and provided reason for cancellation.      Electronically signed by:  Mirlande Zelaya PTA

## 2022-06-08 ENCOUNTER — HOSPITAL ENCOUNTER (OUTPATIENT)
Dept: PHYSICAL THERAPY | Age: 37
Setting detail: THERAPIES SERIES
Discharge: HOME | End: 2022-06-08
Payer: COMMERCIAL

## 2022-06-08 PROCEDURE — 97112 NEUROMUSCULAR REEDUCATION: CPT | Performed by: PHYSICAL THERAPIST

## 2022-06-08 PROCEDURE — 97110 THERAPEUTIC EXERCISES: CPT | Performed by: PHYSICAL THERAPIST

## 2022-06-08 PROCEDURE — 97140 MANUAL THERAPY 1/> REGIONS: CPT | Performed by: PHYSICAL THERAPIST

## 2022-06-08 NOTE — FLOWSHEET NOTE
[place], [address] [facility], [place], operated by [facility], [address]  Phone: [phone]   Fax: [phone]    Physical Therapy Treatment Note/ Progress Report:           Date:  2022    Patient Name:  Skylar Vail    :  1985  MRN: [id number]  Restrictions/Precautions:  50% WB Brace locked in full extension for WB 0-2 weeks, 0-90 ROM in NWB  Medical/Treatment Diagnosis Information:  · Diagnosis: S83.211A (ICD-10-CM) - Bucket-handle tear of medial meniscus of right knee as current injury, initial encounter  · Treatment Diagnosis: 83.211D (ICD-10-CM) - Bucket-handle tear of medial meniscus of right knee as current injury, R knee pain M25.561, R knee effusion M25.461 s/p MM Repair on 22. Insurance/Certification information:  PT Insurance Information: [address] 30 visit / Yeimy Wilson after 30       Physician Information:   Fátima Strange   Has the plan of care been signed (Y/N):        []  Yes  [x]  No     Date of Patient follow up with Physician: 22      Is this a Progress Report:     [x]  Yes  []  No        If Yes:  Date Range for reporting period:  Beginnin22  Endin/10/22    Progress report will be due (10 Rx or 30 days whichever is less):        Recertification will be due (POC Duration  / 90 days whichever is less): 12 weeks      Visit # Insurance Allowable Auth Required   In-person  30 visit / Yeimy Wilson after 30      []  Yes []  No    Telehealth   []  Yes []  No    Total          Functional Scale: FOTO: 43/100    Date assessed:  22      Therapy Diagnosis/Practice Pattern:4I      Number of Comorbidities:  []0     [x]1-2    []3    Latex Allergy:  [x]NO      []YES  Preferred Language for Healthcare:   [x]English       []other:    4 weeks   post-op 6/3/22    Pain level:  0-2/10     SUBJECTIVE:  Reports no issues from last visit. Compliant with brace.  No pain noted in general.  OBJECTIVE:    Re-emphasized importance of compliance with brace and post-op restrictions and precautions. Reviewed no squatting in WB is permitted >07.  Observation:    Test measurements:     OBJECTIVE  Test used 5/11/22 6/8/22   Pain Summary  2-5/10 0/10   Functional questionnaire FOTO 43% 59%   Functional Testing MP girth L/R 36/36.8cm 35.8/34.5    Quad girth 15 cm above SP R/L NT 46.8cm/49.8 cm    Knee flex  85-90 R135 this visit/L 159   ROM Knee ext R/L -5/+10 +10/+10    Hip flex R/L  4-/4+   Strength Quad tone 3+/5 4-/5    Hip abd NT 4, 4+    SLR R/L IND 4-, 4    RESTRICTIONS/PRECAUTIONS:  2-6 weeks post-op 0-90 with brace. D/C crutches after 4 weeks. Brace d/c after 6 weeks. ROM WFL as tolerated in NWB  No WB with flexion >90. ( 8 weeks) Previous ACL/ MMR  May 2021 from Dr. Niharika Lopez. See scanned in protocol in media.   Exercises/Interventions:   4 weeks   post-op 6/3/22    Therapeutic Ex (76887) Sets/sec Reps Notes/CUES   QS/AP with NMR HEP   SLR/ Hip abd with NMR 1# H5 3x10 HEP   GS with strap h30 5 HEP   HS stretches in sitting H30 5 HEP   Heel slides  With strap H10 10 135 on 6/8   Knee ext propping with GS stretches and QS  4'          Sitting knee flexion off EOB SL hip abd 1# H5 3x10 reps HEP   SL clams H5 2x10 reps    Standing HR/ hip abd H5 2x10 reps  HEP with brace   Standing TKE green 2 x10    Prone TKE    TG wall squats 0-55 range  H5 2x10 reps 6/8/22   Prone hip ext/Prone knee flexion Standing B Gastroc stretches H 20\" 2 B 6/1   Standing R Soleus Stretches H20\" 2 R 6/1   Manual Intervention (71360)            Patellar mobilizations 5'                               NMR re-education (20455)   CUES NEEDED   SB bridges H5 2x10 reps    SLS R LE H10 3 reps Brace on + to HEP   [place] ES with QS/SLR H10/10 10' 5' each exercise ESTIM attended this date 6/8/22 with SLR/SL hip abd with 1#    Static Standing  Airex :  NBOS  EO  EC  Head nods  Head turns        6/1 Therapeutic Activity (25423)            Gait training with brace/crutches                  MANUAL      IASTM prone toR gastroc M/L      Akk bolded items completed this date    HEP instruction: (see scanned forms)  Access Code: [email or web address]  URL: [email or web address]  Date: 05/11/2022  Prepared by: Saloni Handing     Exercises  Long Sitting Ankle Pumps - 5-7 x daily - 7 x weekly - 3 sets - 10 reps - 1-2 hold  Long Sitting Quad Set - 5-7 x daily - 7 x weekly - 1 sets - 10 reps - 10 hold  Long Sitting Calf Stretch with Strap - 2 x daily - 7 x weekly - 1 sets - 5 reps - 30\" hold  Seated Table Hamstring Stretch - 2 x daily - 7 x weekly - 1 sets - 5 reps - 30\" hold  Supine Straight Leg Raises - 2 x daily - 7 x weekly - 2 sets - 10 reps  Sitting Heel Slide with Towel - 2 x daily - 7 x weekly - 1 sets - 10 reps - 10\" hold    Access Code: [email or web address]  URL: [email or web address]  Date: 05/23/2022  Prepared by: Saloni Handing    Exercises ; WB exercises with brace on.    Standing Hip Abduction with Counter Support - 1 x daily - 7 x weekly - 2-3 sets - 10 reps - 2 hold  Heel Toe Raises with Counter Support - 1 x daily - 7 x weekly - 2-3 sets - 10 reps  Prone Hip Extension - 2 x daily - 7 x weekly - 10 reps - 2-3 sets - 5 hold  Seated Long Arc Quad - 2 x daily - 7 x weekly - 10 reps - 3 sets - 1 second hold  Side Leg Lifts - 2 x daily - 7 x weekly - 10 reps - 2 sets - 5\" hold  Supine Heel Slide with Strap - 2 x daily - 7 x weekly - 10 reps - 3 sets - 10\" hold     Therapeutic Exercise and NMR EXR  [x] (44579) Provided verbal/tactile cueing for activities related to strengthening, flexibility, endurance, ROM for improvements in LE, proximal hip, and core control with self care, mobility, lifting, ambulation.  [] (08117) Provided verbal/tactile cueing for activities related to improving balance, coordination, kinesthetic sense, posture, motor skill, proprioception  to assist with LE, proximal hip, and core control in self care, mobility, lifting, ambulation and eccentric single leg control. NMR and Therapeutic Activities:    [x] (10374 or 63818) Provided verbal/tactile cueing for activities related to improving balance, coordination, kinesthetic sense, posture, motor skill, proprioception and motor activation to allow for proper function of core, proximal hip and LE with self care and ADLs  [] (47353) Gait Re-education- Provided training and instruction to the patient for proper LE, core and proximal hip recruitment and positioning and eccentric body weight control with ambulation re-education including up and down stairs     Home Exercise Program:    [x] (68373) Reviewed/Progressed HEP activities related to strengthening, flexibility, endurance, ROM of core, proximal hip and LE for functional self-care, mobility, lifting and ambulation/stair navigation   [] (03155)Reviewed/Progressed HEP activities related to improving balance, coordination, kinesthetic sense, posture, motor skill, proprioception of core, proximal hip and LE for self care, mobility, lifting, and ambulation/stair navigation      Manual Treatments:  PROM / STM / Oscillations-Mobs:  G-I, II, III, IV (PA's, Inf., Post.)  [x] (26114) Provided manual therapy to mobilize LE, proximal hip and/or LS spine soft tissue/joints for the purpose of modulating pain, promoting relaxation,  increasing ROM, reducing/eliminating soft tissue swelling/inflammation/restriction, improving soft tissue extensibility and allowing for proper ROM for normal function with self care, mobility, lifting and ambulation. Modalities:  Patient deferred due to time constraint. To ice at home.   [] GAME READY (VASO)- for significant edema, swelling, pain control.      Charges  Timed Code Treatment Minutes: 48'   Total Treatment Minutes: 61'         [] EVAL (LOW) 40619   [] EVAL (MOD) 45875  [] EVAL (HIGH) 17439   [] RE-EVAL     [x] VD(40324) x2    [] IONTO  [x] NMR (50219) x   1  [] VASO  [] Manual Overall Progression Towards Functional goals/ Treatment Progress Update:  [x] Patient is progressing as expected towards functional goals listed. [] Progression is slowed due to complexities/Impairments listed. [] Progression has been slowed due to co-morbidities. [] Plan just implemented, too soon to assess goals progression <30days   [] Goals require adjustment due to lack of progress  [] Patient is not progressing as expected and requires additional follow up with physician  [] Other    Prognosis for POC: [x] Good [] Fair  [] Poor      Patient requires continued skilled intervention: [x] Yes  [] No    Treatment/Activity Tolerance:  [x] Patient able to complete treatment  [] Patient limited by fatigue  [] Patient limited by pain    [] Patient limited by other medical complications  [] Other:     ASSESSMENT: patient presents with R knee pain, limited ROM, strength deficits, gait impairments as expected post-surgical procedure. . No issues with walking with brace unlocked to 90 without crutch. Increased  ROM noted this visit with knee flexion. Patient making progress toward goals. Assess response to TG. Pt requires skilled intervention to restore ROM, strength, functional endurance and balance in order to perform ADLs without significant symptoms or limitations. Return to Play: (if applicable)   []  Stage 1: Intro to Strength   []  Stage 2: Return to Run and Strength   []  Stage 3: Return to Jump and Strength   []  Stage 4: Dynamic Strength and Agility   []  Stage 5: Sport Specific Training     []  Ready to Return to Play, Meets All Above Stages   []  Not Ready for Return to Sports   Comments:                               PLAN: See paras. Progress per protocol for meniscus repair. Patient to see MD Next week.  Sent in basket message this visit regarding progression and request for BFR   [x] Continue per plan of care [] Alter current plan (see comments above)  [] Plan of care initiated [] Hold pending MD visit [] Discharge      Electronically [place] [id number]    Note: If patient does not return for scheduled/ recommended follow up visits, this note will serve as a discharge from care along with most recent update on progress.

## 2022-06-10 ENCOUNTER — HOSPITAL ENCOUNTER (OUTPATIENT)
Dept: PHYSICAL THERAPY | Age: 37
Setting detail: THERAPIES SERIES
Discharge: HOME | End: 2022-06-10
Payer: COMMERCIAL

## 2022-06-10 PROCEDURE — 97110 THERAPEUTIC EXERCISES: CPT | Performed by: PHYSICAL THERAPIST

## 2022-06-10 PROCEDURE — 97112 NEUROMUSCULAR REEDUCATION: CPT | Performed by: PHYSICAL THERAPIST

## 2022-06-10 NOTE — FLOWSHEET NOTE
[place], [address] [place] [address], [address]  Phone: [phone]   Fax: [phone]    Physical Therapy Treatment Note/ Progress Report:           Date:  6/10/2022    Patient Name:  Kalli Reagan    :  1985  MRN: [id number]  Restrictions/Precautions:  50% WB Brace locked in full extension for WB 0-2 weeks, 0-90 ROM in NWB  Medical/Treatment Diagnosis Information:  · Diagnosis: S83.211A (ICD-10-CM) - Bucket-handle tear of medial meniscus of right knee as current injury, initial encounter  · Treatment Diagnosis: 83.211D (ICD-10-CM) - Bucket-handle tear of medial meniscus of right knee as current injury, R knee pain M25.561, R knee effusion M25.461 s/p MM Repair on 22. Insurance/Certification information:  PT Insurance Information: [employer] [facility], [facility] 30 visit / Lewnicolasa Gift after 30       Physician Information:   Ismael Shearer   Has the plan of care been signed (Y/N):        []  Yes  [x]  No     Date of Patient follow up with Physician: 22      Is this a Progress Report:     [x]  Yes  []  No        If Yes:  Date Range for reporting period:  Beginnin22  Ending:     Progress report will be due (10 Rx or 30 days whichever is less): 3/2/25      Recertification will be due (POC Duration  / 90 days whichever is less): 12 weeks      Visit # Insurance Allowable Auth Required   In-person 7 30 visit / Leward Gift after 30      []  Yes []  No    Telehealth   []  Yes []  No    Total          Functional Scale: FOTO: 43/100    Date assessed:  22      Therapy Diagnosis/Practice Pattern:4I      Number of Comorbidities:  []0     [x]1-2    []3    Latex Allergy:  [x]NO      []YES  Preferred Language for Healthcare:   [x]English       []other:    5 weeks   post-op 6/10/22    Pain level:  0-2/10     SUBJECTIVE:  Reports no issues from last visit. Compliant with brace.  No pain noted in general. Sees MD next week. OBJECTIVE:    Re-emphasized importance of compliance with brace and post-op restrictions and precautions. Reviewed no squatting in WB is permitted >66. BFR approved from MD. + this visit. 6/10/22   Observation:    Test measurements:   138 knee flex 6/10/22. OBJECTIVE  Test used 5/11/22 6/8/22   Pain Summary  2-5/10 0/10   Functional questionnaire FOTO 43% 59%   Functional Testing MP girth L/R 36/36.8cm 35.8/34.5    Quad girth 15 cm above SP R/L NT 46.8cm/49.8 cm    Knee flex  85-90 R135 this visit/L 159   ROM Knee ext R/L -5/+10 +10/+10    Hip flex R/L  4-/4+   Strength Quad tone 3+/5 4-/5    Hip abd NT 4, 4+    SLR R/L IND 4-, 4    RESTRICTIONS/PRECAUTIONS:  2-6 weeks post-op 0-90 with brace. D/C crutches after 4 weeks. Brace d/c after 6 weeks. ROM WFL as tolerated in NWB  No WB with flexion >90. ( 8 weeks) Previous ACL/ MMR  May 2021 from Dr. Burak Zabala. See scanned in protocol in media.   Exercises/Interventions:   4 weeks   post-op 6/3/22    Therapeutic Ex (66910) Sets/sec Reps Notes/CUES   QS/AP with NMR HEP   SLR/ Hip abd with BFR 6/10/22 H5 3x10 HEP   GS with strap h30 5 HEP   HS stretches in sitting H30 5 HEP   Heel slides  With strap H10 10 138 on 6/10   Knee ext propping with GS stretches and QS     Standing knee flexion H5 2x10    Sitting knee flexion off EOB SL hip abd with BFR H5 3x10 reps HEP   SL clams with TB H5 3x10 reps Yellow TB + to HEP   Standing HR/ hip abd H5 3x10 reps  HEP with brace   Standing TKE    Prone TKE H5 3x10 reps    TG wall squats 0-80 range  With ball squeeze H5 3x10 reps 6/8/22/6/10/22   Prone hip ext/Prone knee flexion Standing B Gastroc stretches 6/1   Standing R Soleus Stretches 6/1   Manual Intervention (37366)            Patellar mobilizations 5'                               NMR re-education (46404)   CUES NEEDED   SB bridges H5 3x10 reps    SLS R LE  H10 3 reps    [place] ES with QS/SLR Static Standing  Airex :  NBOS  EO  EC  Head nods  Head turns       20\"X2  20\" X2     6/1   Tandem stance Airex EO  Head nods  Head turns   20\"x1 ea  1x8  1x8 ea                            Therapeutic Activity (13560)            Gait training with brace/crutches                  MANUAL      IASTM prone toR gastroc M/L       Li BFR Smart Phase Protocol       Spoke with Dr. Elsy Casillas regarding the use of BFR with patient. Ok per MD   BFR Session  1             [facility] Personalized Tourniquet System for BFR     LE: up to 80% LOP 60%      UE: up to 50% LOP        BFR Location:   BFR set at:  mmHg Upper Quad               Protocol: 30/15/15/15       Exercises:   Reps 30 sec Notes           SLR # of reps required  30 Rest     completed  30     Hip abd in SL reps required  30 Rest     completed  30     Seated LAQ reps required  30 Rest     completed  30     Standing mini squats to 45 deg reps required  30 Rest     completed  30               HEP instruction: (see scanned forms)  Access Code: [email or web address]  URL: [email or web address]  Date: 05/11/2022  Prepared by: Boy Mauro     Exercises  Long Sitting Ankle Pumps - 5-7 x daily - 7 x weekly - 3 sets - 10 reps - 1-2 hold  Long Sitting Quad Set - 5-7 x daily - 7 x weekly - 1 sets - 10 reps - 10 hold  Long Sitting Calf Stretch with Strap - 2 x daily - 7 x weekly - 1 sets - 5 reps - 30\" hold  Seated Table Hamstring Stretch - 2 x daily - 7 x weekly - 1 sets - 5 reps - 30\" hold  Supine Straight Leg Raises - 2 x daily - 7 x weekly - 2 sets - 10 reps  Sitting Heel Slide with Towel - 2 x daily - 7 x weekly - 1 sets - 10 reps - 10\" hold    Access Code: [email or web address]  URL: [email or web address]  Date: 05/23/2022  Prepared by: Boy Mauro    Exercises ; WB exercises with brace on.    Standing Hip Abduction with Counter Support - 1 x daily - 7 x weekly - 2-3 sets - 10 reps - 2 hold  Heel Toe Raises with Counter Support - 1 x daily - 7 x weekly - 2-3 sets - 10 reps  Prone Hip Extension - 2 x daily - 7 x weekly - 10 reps - 2-3 sets - 5 hold  Seated Long Arc Quad - 2 x daily - 7 x weekly - 10 reps - 3 sets - 1 second hold  Side Leg Lifts - 2 x daily - 7 x weekly - 10 reps - 2 sets - 5\" hold  Supine Heel Slide with Strap - 2 x daily - 7 x weekly - 10 reps - 3 sets - 10\" hold     Therapeutic Exercise and NMR EXR  [x] (10427) Provided verbal/tactile cueing for activities related to strengthening, flexibility, endurance, ROM for improvements in LE, proximal hip, and core control with self care, mobility, lifting, ambulation.  [] (76908) Provided verbal/tactile cueing for activities related to improving balance, coordination, kinesthetic sense, posture, motor skill, proprioception  to assist with LE, proximal hip, and core control in self care, mobility, lifting, ambulation and eccentric single leg control.      NMR and Therapeutic Activities:    [x] (81622 or 55813) Provided verbal/tactile cueing for activities related to improving balance, coordination, kinesthetic sense, posture, motor skill, proprioception and motor activation to allow for proper function of core, proximal hip and LE with self care and ADLs  [] (81175) Gait Re-education- Provided training and instruction to the patient for proper LE, core and proximal hip recruitment and positioning and eccentric body weight control with ambulation re-education including up and down stairs     Home Exercise Program:    [x] (02885) Reviewed/Progressed HEP activities related to strengthening, flexibility, endurance, ROM of core, proximal hip and LE for functional self-care, mobility, lifting and ambulation/stair navigation   [] (75428)Reviewed/Progressed HEP activities related to improving balance, coordination, kinesthetic sense, posture, motor skill, proprioception of core, proximal hip and LE for self care, mobility, lifting, and ambulation/stair navigation      Manual Treatments:  PROM / STM / Oscillations-Mobs:  G-I, II, III, IV (PA's, Inf., Post.)  [x] (33226) Provided manual therapy to mobilize LE, proximal hip and/or LS spine soft tissue/joints for the purpose of modulating pain, promoting relaxation,  increasing ROM, reducing/eliminating soft tissue swelling/inflammation/restriction, improving soft tissue extensibility and allowing for proper ROM for normal function with self care, mobility, lifting and ambulation. Modalities:  Patient deferred due to time constraint. To ice at home.   [] GAME READY (VASO)- for significant edema, swelling, pain control. Charges  Timed Code Treatment Minutes: 48'   Total Treatment Minutes: 48'         [] EVAL (LOW) 57768   [] EVAL (MOD) 11384  [] EVAL (HIGH) 11548   [] RE-EVAL     [x] GV(81336) x2    [] IONTO  [x] NMR (29569) x   2  [] VASO  [] Manual (58789) x      [x] Other: BFR  [] TA x      [] Mech Traction (30276)  [] ES(attended) (62939)      [] ES (un) (90239):         GOALS:  Patient stated goal: return to walking  []? Progressing: [x]? Met: []? Not Met: []? Adjusted     Therapist goals for Patient:   Short Term Goals: To be achieved in: 2 weeks  1. Independent in HEP and progression per patient tolerance, in order to prevent re-injury. []? Progressing: [x]? Met: []? Not Met: []? Adjusted  2. Patient will have a decrease in pain to facilitate improvement in movement, function, and ADLs as indicated by Functional Deficits. []? Progressing: [x]? Met: []? Not Met: []? Adjusted     Long Term Goals: To be achieved in: 12 weeks  1. Disability index score of 69% or more per FOTO to assist with reaching prior level of function. [x]? Progressing: []? Met: []? Not Met: []? Adjusted  2. Patient will demonstrate increased AROM to 0-130 R knee or better to allow for proper joint functioning as indicated by patients Functional Deficits. []? Progressing: [x]? Met: []? Not Met: []? Adjusted  3.  Patient will demonstrate an increase in Strength to good proximal hip strength and control, within 5lb HHD in LE to allow for proper functional mobility as indicated by patients Functional Deficits. [x]? Progressing: []? Met: []? Not Met: []? Adjusted  4. Patient will return to walking with assistive device with normalize gait pattern functional activities without increased symptoms or restriction. [x]? Progressing: []? Met: []? Not Met: []? Adjusted  5. Reciprocal gait with stairs (patient specific functional goal)    []? Progressing: []? Met: [x]? Not Met: []? Adjusted       Progression Towards Functional goals:  [x] Patient is progressing as expected towards functional goals listed. [] Progression is slowed due to complexities listed. [] Progression has been slowed due to co-morbidities. [] Plan just implemented, too soon to assess goals progression  [] Other:         Overall Progression Towards Functional goals/ Treatment Progress Update:  [x] Patient is progressing as expected towards functional goals listed. [] Progression is slowed due to complexities/Impairments listed. [] Progression has been slowed due to co-morbidities. [] Plan just implemented, too soon to assess goals progression <30days   [] Goals require adjustment due to lack of progress  [] Patient is not progressing as expected and requires additional follow up with physician  [] Other    Prognosis for POC: [x] Good [] Fair  [] Poor      Patient requires continued skilled intervention: [x] Yes  [] No    Treatment/Activity Tolerance:  [x] Patient able to complete treatment  [] Patient limited by fatigue  [] Patient limited by pain    [] Patient limited by other medical complications  [] Other:     ASSESSMENT: patient presents with R knee pain, limited ROM, strength deficits, gait impairments as expected post-surgical procedure. . No issues with walking with brace unlocked to 90 without crutch. Increased  ROM noted this visit with knee flexion. Patient making progress toward goals.  Assess response to BFR   Pt requires skilled intervention to restore ROM, strength, functional endurance and balance in order to perform ADLs without significant symptoms or limitations. Return to Play: (if applicable)   []  Stage 1: Intro to Strength   []  Stage 2: Return to Run and Strength   []  Stage 3: Return to Jump and Strength   []  Stage 4: Dynamic Strength and Agility   []  Stage 5: Sport Specific Training     []  Ready to Return to Play, Meets All Above Stages   []  Not Ready for Return to Sports   Comments:                               PLAN: See eval. Progress per protocol for meniscus repair. Patient to see MD Next week. Sent in basket message this visit regarding progression and request for BFR   [x] Continue per plan of care [] Alter current plan (see comments above)  [] Plan of care initiated [] Hold pending MD visit [] Discharge      Electronically [place] [id number]    Note: If patient does not return for scheduled/ recommended follow up visits, this note will serve as a discharge from care along with most recent update on progress.

## 2022-06-14 ENCOUNTER — OFFICE VISIT (OUTPATIENT)
Dept: ORTHOPEDIC SURGERY | Age: 37
End: 2022-06-14

## 2022-06-14 VITALS — BODY MASS INDEX: 23.66 KG/M2 | WEIGHT: 142 LBS | HEIGHT: 65 IN

## 2022-06-14 DIAGNOSIS — S83.211A BUCKET-HANDLE TEAR OF MEDIAL MENISCUS OF RIGHT KNEE AS CURRENT INJURY, INITIAL ENCOUNTER: Primary | ICD-10-CM

## 2022-06-14 PROCEDURE — 99024 POSTOP FOLLOW-UP VISIT: CPT | Performed by: ORTHOPAEDIC SURGERY

## 2022-06-15 ENCOUNTER — HOSPITAL ENCOUNTER (OUTPATIENT)
Dept: PHYSICAL THERAPY | Age: 37
Setting detail: THERAPIES SERIES
Discharge: HOME | End: 2022-06-15
Payer: COMMERCIAL

## 2022-06-15 NOTE — FLOWSHEET NOTE
Physical Therapy  Cancellation/No-show Note  Patient Name:  Alison May  :  1985   Date:  6/15/2022  Cancelled visits to date: 1  6/3  No-shows to date: 1 6/15    Patient status for today's appointment patient:  []  Cancelled  []  Rescheduled appointment  [x]  No-show     Reason given by patient:  []  Patient ill  []  Conflicting appointment  []  No transportation    []  Conflict with work  []  No reason given  []  Other:     Comments:      Phone call information:   [x]  Phone call made today to patient at _ time at number provided:      []  Patient answered, conversation as follows:    [x]  Patient did not answer, message left as follows: Reminded patient that she missed today's appointment and reminded patient of upcoming appointment on Friday. Left message on Voice mail. []  Phone call not made today  []  Phone call not needed - pt contacted us to cancel and provided reason for cancellation.      Electronically signed by:  Darlin Shay PT  )

## 2022-06-16 NOTE — PROGRESS NOTES
6/14/2022     Reason for visit:  Status post right knee arthroscopic medial meniscus repair via inside-out technique on 5/6/2022    History of Present Illness:  Patient presents for postop evaluation. Overall she is doing well. She is happy with her progress. She is no longer using crutches or brace. Objective:  Ht 5' 5\" (1.651 m)   Wt 142 lb (64.4 kg)   BMI 23.63 kg/m²      Physical Exam:  The patient is well-appearing and in no apparent distress  Examination of the right knee   There is no effusion, no gross deformity or skin changes  No ligamentous instability is noted, range of motion reveals 0 to 130 degrees  5 out of 5 strength throughout distal muscle groups  Sensation is intact to light touch throughout all distributions  There is no calf swelling or tenderness  Palpable DP pulse, brisk cap refill, 2+ symmetric reflexes      Assessment:  Status post right knee arthroscopic medial meniscus repair via inside-out technique on 5/6/2022    Plan:  Overall the patient is doing well. Continue physical therapy per protocol. Return to see me in 6 weeks. Guerrero Martínez MD            Orthopaedic Surgery Sports Medicine and [facility] and [facility]            Team Physician Anna ([facility])      Disclaimer: This note was dictated with voice recognition software. Though review and correction are routine, we apologize for any errors.

## 2022-06-17 ENCOUNTER — HOSPITAL ENCOUNTER (OUTPATIENT)
Dept: PHYSICAL THERAPY | Age: 37
Setting detail: THERAPIES SERIES
Discharge: HOME | End: 2022-06-17
Payer: COMMERCIAL

## 2022-06-17 NOTE — FLOWSHEET NOTE
Physical Therapy  Cancellation/No-show Note  Patient Name:  Kalli Reagan  :  1985   Date:  2022  Cancelled visits to date: 2  6/3,   No-shows to date: 1  6/15    Patient status for today's appointment patient:  [x]  Cancelled  []  Rescheduled appointment  []  No-show     Reason given by patient:  []  Patient ill  []  Conflicting appointment  [x]  No transportation    []  Conflict with work  []  No reason given  []  Other:     Comments:      Phone call information:   []  Phone call made today to patient at _ time at number provided:      []  Patient answered, conversation as follows:    []  Patient did not answer, message left as follows:  []  Phone call not made today  [x]  Phone call not needed - pt contacted us to cancel and provided reason for cancellation.      Electronically signed by:  Jade Blake

## 2022-06-22 ENCOUNTER — HOSPITAL ENCOUNTER (OUTPATIENT)
Dept: PHYSICAL THERAPY | Age: 37
Setting detail: THERAPIES SERIES
Discharge: HOME | End: 2022-06-22
Payer: COMMERCIAL

## 2022-06-22 PROCEDURE — 97110 THERAPEUTIC EXERCISES: CPT | Performed by: PHYSICAL THERAPIST

## 2022-06-22 PROCEDURE — 97112 NEUROMUSCULAR REEDUCATION: CPT | Performed by: PHYSICAL THERAPIST

## 2022-06-22 NOTE — FLOWSHEET NOTE
[place], [facility], [address]  Phone: [phone]   Fax: [phone]    Physical Therapy Treatment Note/ Progress Report:           Date:  2022    Patient Name:  Maureen Wahl    :  1985  MRN: [id number]  Restrictions/Precautions:  50% WB Brace locked in full extension for WB 0-2 weeks, 0-90 ROM in NWB  Medical/Treatment Diagnosis Information:  · Diagnosis: S83.211A (ICD-10-CM) - Bucket-handle tear of medial meniscus of right knee as current injury, initial encounter  · Treatment Diagnosis: 83.211D (ICD-10-CM) - Bucket-handle tear of medial meniscus of right knee as current injury, R knee pain M25.561, R knee effusion M25.461 s/p MM Repair on 22. Insurance/Certification information:  PT Insurance Information: [facility] [address] 30 visit / [facility] after 30       Physician Information:   Jeffery Suarez   Has the plan of care been signed (Y/N):        []  Yes  [x]  No     Date of Patient follow up with Physician: 22      Is this a Progress Report:     []  Yes  [x]  No        If Yes:  Date Range for reporting period:  Beginnin22  Ending:     Progress report will be due (10 Rx or 30 days whichever is less): [birth year]      Recertification will be due (POC Duration  / 90 days whichever is less): 12 weeks      Visit # Insurance Allowable Auth Required   In-person  30 visit / [facility] after 30      []  Yes []  No    Telehealth   []  Yes []  No    Total          Functional Scale: FOTO: 43/100    Date assessed:  22      Therapy Diagnosis/Practice Pattern:4I      Number of Comorbidities:  []0     [x]1-2    []3    Latex Allergy:  [x]NO      []YES  Preferred Language for Healthcare:   [x]English       []other:    6 weeks   post-op 22    Pain level:  0/10     SUBJECTIVE:  Reports no issues from last visit. Compliant with brace. Saw MD last week.  No issues and told to continue protocol. Unable to attend PT last visit due to transportation issues. Patient had car today and drove to PT. Overall, feel good and progressing well. OBJECTIVE:   Brace formally D/C this visit, although patient reports not using the brace in the house, but uses it for outside ambulation. Instructed that she can start weaning out of the brace as tolerated. Reviewed no squatting in WB is permitted >57. BFR approved from MD. Kaylyn Ball Observation:    Test measurements:   147 knee flexion on 6/22/22  OBJECTIVE  Test used 5/11/22 6/8/22   Pain Summary  2-5/10 0/10   Functional questionnaire FOTO 43% 59%   Functional Testing MP girth L/R 36/36.8cm 35.8/34.5    Quad girth 15 cm above SP R/L NT 46.8cm/49.8 cm    Knee flex  85-90 R135 this visit/L 159   ROM Knee ext R/L -5/+10 +10/+10    Hip flex R/L  4-/4+   Strength Quad tone 3+/5 4-/5    Hip abd NT 4, 4+    SLR R/L IND 4-, 4    RESTRICTIONS/PRECAUTIONS:  2-6 weeks post-op 0-90 with brace. D/C crutches after 4 weeks. Brace d/c after 6 weeks. ROM WFL as tolerated in NWB  No WB with flexion >90. ( 8 weeks). Leg press 0-90  And bike at 8 week post-op. Previous ACL/ MMR  May 2021 from Dr. Elan Morrison. See scanned in protocol in media.   Exercises/Interventions:      Therapeutic Ex (60923) Sets/sec Reps Notes/CUES   QS/AP with NMR HEP   SLR with BFR H5 30 reps HEP   LAQ 3# with BFR H5 30 reps    GS with strap h30 5 HEP   HS stretches in sitting H30 5 HEP   Heel slides  With strap H10 10 147 6/22/22   Knee ext propping with GS stretches and QS     Standing knee flexion    Sitting knee flexion off EOB SL hip abd with BFR HEP   Side -stepping  With band YTB loop  3 laps    SL clams with TB H5 3x10 reps Yellow TB + to HEP   Standing HR H5 3x10 reps  HEP   Standing TKE    Prone TKE    TG wall squats 0-80 range  With ball squeeze SB wall squats 0-60 range H5  2x10 reps    Prone hip ext/Prone knee flexion Standing B Gastroc stretches 6/1   Standing R Soleus Stretches Suellyn Klaus    Exercises ; WB exercises with brace on. Standing Hip Abduction with Counter Support - 1 x daily - 7 x weekly - 2-3 sets - 10 reps - 2 hold  Heel Toe Raises with Counter Support - 1 x daily - 7 x weekly - 2-3 sets - 10 reps  Prone Hip Extension - 2 x daily - 7 x weekly - 10 reps - 2-3 sets - 5 hold  Seated Long Arc Quad - 2 x daily - 7 x weekly - 10 reps - 3 sets - 1 second hold  Side Leg Lifts - 2 x daily - 7 x weekly - 10 reps - 2 sets - 5\" hold  Supine Heel Slide with Strap - 2 x daily - 7 x weekly - 10 reps - 3 sets - 10\" hold     Therapeutic Exercise and NMR EXR  [x] (20391) Provided verbal/tactile cueing for activities related to strengthening, flexibility, endurance, ROM for improvements in LE, proximal hip, and core control with self care, mobility, lifting, ambulation.  [] (97955) Provided verbal/tactile cueing for activities related to improving balance, coordination, kinesthetic sense, posture, motor skill, proprioception  to assist with LE, proximal hip, and core control in self care, mobility, lifting, ambulation and eccentric single leg control.      NMR and Therapeutic Activities:    [x] (33673 or 53840) Provided verbal/tactile cueing for activities related to improving balance, coordination, kinesthetic sense, posture, motor skill, proprioception and motor activation to allow for proper function of core, proximal hip and LE with self care and ADLs  [] (82995) Gait Re-education- Provided training and instruction to the patient for proper LE, core and proximal hip recruitment and positioning and eccentric body weight control with ambulation re-education including up and down stairs     Home Exercise Program:    [x] (36956) Reviewed/Progressed HEP activities related to strengthening, flexibility, endurance, ROM of core, proximal hip and LE for functional self-care, mobility, lifting and ambulation/stair navigation   [] (04284)Reviewed/Progressed HEP activities related to improving balance, coordination, kinesthetic sense, posture, motor skill, proprioception of core, proximal hip and LE for self care, mobility, lifting, and ambulation/stair navigation      Manual Treatments:  PROM / STM / Oscillations-Mobs:  G-I, II, III, IV (PA's, Inf., Post.)  [x] (17601) Provided manual therapy to mobilize LE, proximal hip and/or LS spine soft tissue/joints for the purpose of modulating pain, promoting relaxation,  increasing ROM, reducing/eliminating soft tissue swelling/inflammation/restriction, improving soft tissue extensibility and allowing for proper ROM for normal function with self care, mobility, lifting and ambulation. Modalities: To ice at home.   [] GAME READY (VASO)- for significant edema, swelling, pain control. Charges  Timed Code Treatment Minutes: 61'   Total Treatment Minutes: 61'         [] EVAL (LOW) 455 1011   [] EVAL (MOD) 81196  [] EVAL (HIGH) 19128   [] RE-EVAL     [x] [place](28740) x2    [] IONTO  [x] NMR (15196) x   2  [] VASO  [] Manual (17182) x      [x] Other: BFR  [] TA x      [] Mech Traction (49587)  [] ES(attended) (01397)      [] ES (un) (40208):         GOALS:  Patient stated goal: return to walking  []? Progressing: [x]? Met: []? Not Met: []? Adjusted     Therapist goals for Patient:   Short Term Goals: To be achieved in: 2 weeks  1. Independent in HEP and progression per patient tolerance, in order to prevent re-injury. []? Progressing: [x]? Met: []? Not Met: []? Adjusted  2. Patient will have a decrease in pain to facilitate improvement in movement, function, and ADLs as indicated by Functional Deficits. []? Progressing: [x]? Met: []? Not Met: []? Adjusted     Long Term Goals: To be achieved in: 12 weeks  1. Disability index score of 69% or more per FOTO to assist with reaching prior level of function. [x]? Progressing: []? Met: []? Not Met: []? Adjusted  2.  Patient will demonstrate increased AROM to 0-130 R knee or better to allow for proper joint functioning as indicated by patients Functional Deficits. []? Progressing: [x]? Met: []? Not Met: []? Adjusted  3. Patient will demonstrate an increase in Strength to good proximal hip strength and control, within 5lb HHD in LE to allow for proper functional mobility as indicated by patients Functional Deficits. [x]? Progressing: []? Met: []? Not Met: []? Adjusted  4. Patient will return to walking with assistive device with normalize gait pattern functional activities without increased symptoms or restriction. [x]? Progressing: []? Met: []? Not Met: []? Adjusted  5. Reciprocal gait with stairs (patient specific functional goal)    []? Progressing: []? Met: [x]? Not Met: []? Adjusted       Progression Towards Functional goals:  [x] Patient is progressing as expected towards functional goals listed. [] Progression is slowed due to complexities listed. [] Progression has been slowed due to co-morbidities. [] Plan just implemented, too soon to assess goals progression  [] Other:         Overall Progression Towards Functional goals/ Treatment Progress Update:  [x] Patient is progressing as expected towards functional goals listed. [] Progression is slowed due to complexities/Impairments listed. [] Progression has been slowed due to co-morbidities. [] Plan just implemented, too soon to assess goals progression <30days   [] Goals require adjustment due to lack of progress  [] Patient is not progressing as expected and requires additional follow up with physician  [] Other    Prognosis for POC: [x] Good [] Fair  [] Poor      Patient requires continued skilled intervention: [x] Yes  [] No    Treatment/Activity Tolerance:  [x] Patient able to complete treatment  [] Patient limited by fatigue  [] Patient limited by pain    [] Patient limited by other medical complications  [] Other:     ASSESSMENT: Patient presents with R knee pain, limited ROM, strength deficits, gait impairments as expected post-surgical procedure.  Patient progressing well with ROM/strength. Increased knee flexion ROM noted. No issues noted with BFR last visit or this visit, just fatigue. Reports minimal to no pain. Patient making progress toward goals. Pt requires skilled intervention to restore ROM, strength, functional endurance and balance in order to perform ADLs without significant symptoms or limitations. Return to Play: (if applicable)   []  Stage 1: Intro to Strength   []  Stage 2: Return to Run and Strength   []  Stage 3: Return to Jump and Strength   []  Stage 4: Dynamic Strength and Agility   []  Stage 5: Sport Specific Training     []  Ready to Return to Play, Meets All Above Stages   []  Not Ready for Return to Sports   Comments:                               PLAN: See eval. Progress per protocol for meniscus repair. Progress CKC as tolerated per protocol. [x] Continue per plan of care [] Alter current plan (see comments above)  [] Plan of care initiated [] Hold pending MD visit [] Discharge      Electronically [place] [id number]    Note: If patient does not return for scheduled/ recommended follow up visits, this note will serve as a discharge from care along with most recent update on progress.

## 2022-06-24 ENCOUNTER — HOSPITAL ENCOUNTER (OUTPATIENT)
Dept: PHYSICAL THERAPY | Age: 37
Setting detail: THERAPIES SERIES
Discharge: HOME | End: 2022-06-24
Payer: COMMERCIAL

## 2022-06-24 NOTE — FLOWSHEET NOTE
Physical Therapy  Cancellation/No-show Note  Patient Name:  Halima Urrutia  :  1985   Date:  2022  Cancelled visits to date: 2  6/3,   No-shows to date: 2  6/15,     Patient status for today's appointment patient:  []  Cancelled  []  Rescheduled appointment  [x]  No-show     Reason given by patient:  []  Patient ill  []  Conflicting appointment  [x]  No transportation    []  Conflict with work  []  No reason given  []  Other:     Comments:      Phone call information:   []  Phone call made today to patient at _ time at number provided:      []  Patient answered, conversation as follows:    []  Patient did not answer, message left as follows:  [x]  Phone call not made today: Patient has stated issues with transportation with previous missed appointments. []  Phone call not needed - pt contacted us to cancel and provided reason for cancellation.      Electronically signed by:  Jade Cotton

## 2022-06-29 ENCOUNTER — HOSPITAL ENCOUNTER (OUTPATIENT)
Dept: PHYSICAL THERAPY | Age: 37
Setting detail: THERAPIES SERIES
Discharge: HOME | End: 2022-06-29
Payer: COMMERCIAL

## 2022-06-29 PROCEDURE — 97112 NEUROMUSCULAR REEDUCATION: CPT

## 2022-06-29 PROCEDURE — 97110 THERAPEUTIC EXERCISES: CPT

## 2022-06-29 PROCEDURE — 97140 MANUAL THERAPY 1/> REGIONS: CPT

## 2022-06-29 NOTE — FLOWSHEET NOTE
[address], [address] [facility], [address]  Phone: [phone]   Fax: [phone]    Physical Therapy Treatment Note/ Progress Report:           Date:  2022    Patient Name:  Bobby Altman    :  1985  MRN: [id number]  Restrictions/Precautions:  50% WB Brace locked in full extension for WB 0-2 weeks, 0-90 ROM in NWB  Medical/Treatment Diagnosis Information:  · Diagnosis: S83.211A (ICD-10-CM) - Bucket-handle tear of medial meniscus of right knee as current injury, initial encounter  · Treatment Diagnosis: 83.211D (ICD-10-CM) - Bucket-handle tear of medial meniscus of right knee as current injury, R knee pain M25.561, R knee effusion M25.461 s/p MM Repair on 22. Insurance/Certification information:  PT Insurance Information: [address] 30 visit / Isaac Hester after 30       Physician Information:   Arlen Waldrop   Has the plan of care been signed (Y/N):        []  Yes  [x]  No     Date of Patient follow up with Physician: 22      Is this a Progress Report:     []  Yes  [x]  No        If Yes:  Date Range for reporting period:  Beginnin22  Ending:     Progress report will be due (10 Rx or 30 days whichever is less): 96      Recertification will be due (POC Duration  / 90 days whichever is less): 12 weeks      Visit # Insurance Allowable Auth Required   In-person  30 visit / Isaac Hester after 30      []  Yes []  No    Telehealth   []  Yes []  No    Total          Functional Scale: FOTO: 43/100    Date assessed:  22      Therapy Diagnosis/Practice Pattern:4I      Number of Comorbidities:  []0     [x]1-2    []3    Latex Allergy:  [x]NO      []YES  Preferred Language for Healthcare:   [x]English       []other:    6 weeks   post-op 22    Pain level:  0/10     SUBJECTIVE:  Reports no issues from last visit. Compliant with brace. Saw MD last week.  No issues and told to continue protocol. OBJECTIVE:   Brace formally D/C this visit, although patient reports not using the brace in the house, but uses it for outside ambulation. Instructed that she can start weaning out of the brace as tolerated. Reviewed no squatting in WB is permitted >08. BFR approved from MD.   [facility] Observation:    Test measurements:   147 knee flexion on 6/22/22  OBJECTIVE  Test used 5/11/22 6/8/22   Pain Summary  2-5/10 0/10   Functional questionnaire FOTO 43% 59%   Functional Testing MP girth L/R 36/36.8cm 35.8/34.5    Quad girth 15 cm above SP R/L NT 46.8cm/49.8 cm    Knee flex  85-90 R135 this visit/L 159   ROM Knee ext R/L -5/+10 +10/+10    Hip flex R/L  4-/4+   Strength Quad tone 3+/5 4-/5    Hip abd NT 4, 4+    SLR R/L IND 4-, 4    RESTRICTIONS/PRECAUTIONS:  2-6 weeks post-op 0-90 with brace. D/C crutches after 4 weeks. Brace d/c after 6 weeks. ROM WFL as tolerated in NWB  No WB with flexion >90. ( 8 weeks). Leg press 0-90  And bike at 8 week post-op. Previous ACL/ MMR  May 2021 from Dr. Elder Millard. See scanned in protocol in media. Exercises/Interventions:      BFR Smart Phase Protocol                    BFR Session ? 1 min rest period between each set of repetitions.   2 Min rest/reperfusion between    each exercises protocol perfromed                      BFR Locations R thigh BFR set at: 156 mmHg 80%                   Protocol: 30/15/15/15           Exercises:   Reps 1 min Reps 1 min Reps 1 min Reps Notes                SLR fllexion # of reps required  30 Rest 15 Rest 15 Rest 15     completed  30          SLR AB reps required  15  15  15  15     completed  15          SLR prone hip extension reps required  15  15  15  15     completed  15          LAQ 3# reps required  15  15  15  15     completed  15                              Therapeutic Ex (63094) Sets/sec Reps Notes/CUES   QS/AP with NMR HEP   SLR with BFR H5 30 reps HEP   LAQ 3# with BFR    GS with strap h30 5 HEP   HS stretches in sitting H30 5 HEP   Heel slides  With strap H10 10 147 6/22/22   Knee ext propping with GS stretches and QS     Standing knee flexion    Sitting knee flexion off EOB SL hip abd with BFR HEP   Side -stepping  With band YTB loop      SL clams with TB H5  Yellow TB + to HEP   Standing HR H5 3x10 reps  HEP   Standing TKE green    Prone TKE H5    TG wall squats 0-80 range  With ball squeeze SB wall squats 0-60 range H5  2x10 reps    Prone hip ext/Prone knee flexion Standing B Gastroc stretches H 20\" 2 B 6/1   Standing functional ankle DF/Fwd WS R 10' 8 6/29 ; after manual ankle/foot mobs   Supine DF/PF ankle AROM  2 10 6/29         Standing R Soleus Stretches H20\" 2 R 6/1   Manual Intervention (44944)            Patellar mobilizations                                NMR re-education (45762)   CUES NEEDED   SB bridges H5 3x10 reps    SLS R LE  On Airex EO H10 3 reps    [place] ES with QS/SLR Static Standing  Airex :  Tandem  EO  EC  Head nods  Head turns         20\"X2  20\" X2  1 x8 L/R  1 X8 L/R    See BFR protocol below X15'  6/29         Cone taps in standing Ant/med/ PL  Fatigued at end               Therapeutic Activity (48076)      FSU/LSU 6\"  With CL march H2 2x10 6/29: needed tactile/manual cues to avoid L femoral /medial rotation                     MANUAL      Ankle mobs TC and STJ distractions Grade 3  Dorsal glides Grade 2-3   6/29:   Calcaneal PROM IN?EV   5\" X8    Manual achilles stretches  30\" X3    Streches at navicular and STJ into ,functional pronation  10\" X8 Grade 3          IASTM prone toR gastroc M/L HG7 X10 strokes superior and inferior X5'       [facility] BFR Smart Phase Protocol       Spoke with Dr. Richmond Gates regarding the use of BFR with patient.  Ok per MD   BFR Session  2             [facility] Personalized Tourniquet System for BFR     LE: up to 80% LOP 70%      UE: up to 50% LOP        BFR Location:   BFR set at:  mmHg Upper Quad  119   lb            Protocol: 30/15/15/15 Exercises:   Reps 30 sec Notes           SLR # of reps required  30 Rest     completed  30     Seated LAQ  reps required  30 Rest 3# wt    completed  30     FSU 4\" reps required  30 Rest     completed  30     Standing mini squats to 45 deg reps required  30 Rest     completed  30               HEP instruction: (see scanned forms)  Access Code: [email or web address]  URL: [email or web address]  Date: 05/11/2022  Prepared by: Serg Lebron     Exercises  Long Sitting Ankle Pumps - 5-7 x daily - 7 x weekly - 3 sets - 10 reps - 1-2 hold  Long Sitting Quad Set - 5-7 x daily - 7 x weekly - 1 sets - 10 reps - 10 hold  Long Sitting Calf Stretch with Strap - 2 x daily - 7 x weekly - 1 sets - 5 reps - 30\" hold  Seated Table Hamstring Stretch - 2 x daily - 7 x weekly - 1 sets - 5 reps - 30\" hold  Supine Straight Leg Raises - 2 x daily - 7 x weekly - 2 sets - 10 reps  Sitting Heel Slide with Towel - 2 x daily - 7 x weekly - 1 sets - 10 reps - 10\" hold    Access Code: [email or web address]  URL: [email or web address]  Date: 05/23/2022  Prepared by: Serg Lebron    Exercises ; WB exercises with brace on.    Standing Hip Abduction with Counter Support - 1 x daily - 7 x weekly - 2-3 sets - 10 reps - 2 hold  Heel Toe Raises with Counter Support - 1 x daily - 7 x weekly - 2-3 sets - 10 reps  Prone Hip Extension - 2 x daily - 7 x weekly - 10 reps - 2-3 sets - 5 hold  Seated Long Arc Quad - 2 x daily - 7 x weekly - 10 reps - 3 sets - 1 second hold  Side Leg Lifts - 2 x daily - 7 x weekly - 10 reps - 2 sets - 5\" hold  Supine Heel Slide with Strap - 2 x daily - 7 x weekly - 10 reps - 3 sets - 10\" hold     Therapeutic Exercise and NMR EXR  [x] (16987) Provided verbal/tactile cueing for activities related to strengthening, flexibility, endurance, ROM for improvements in LE, proximal hip, and core control with self care, mobility, lifting, ambulation.  [] (36825) Provided verbal/tactile cueing for activities related to improving balance, coordination, kinesthetic sense, posture, motor skill, proprioception  to assist with LE, proximal hip, and core control in self care, mobility, lifting, ambulation and eccentric single leg control. NMR and Therapeutic Activities:    [x] (42983 or 83654) Provided verbal/tactile cueing for activities related to improving balance, coordination, kinesthetic sense, posture, motor skill, proprioception and motor activation to allow for proper function of core, proximal hip and LE with self care and ADLs  [] (89305) Gait Re-education- Provided training and instruction to the patient for proper LE, core and proximal hip recruitment and positioning and eccentric body weight control with ambulation re-education including up and down stairs     Home Exercise Program:    [x] (43262) Reviewed/Progressed HEP activities related to strengthening, flexibility, endurance, ROM of core, proximal hip and LE for functional self-care, mobility, lifting and ambulation/stair navigation   [] (82574)Reviewed/Progressed HEP activities related to improving balance, coordination, kinesthetic sense, posture, motor skill, proprioception of core, proximal hip and LE for self care, mobility, lifting, and ambulation/stair navigation      Manual Treatments:  PROM / STM / Oscillations-Mobs:  G-I, II, III, IV (PA's, Inf., Post.)  [x] (90491) Provided manual therapy to mobilize LE, proximal hip and/or LS spine soft tissue/joints for the purpose of modulating pain, promoting relaxation,  increasing ROM, reducing/eliminating soft tissue swelling/inflammation/restriction, improving soft tissue extensibility and allowing for proper ROM for normal function with self care, mobility, lifting and ambulation. Modalities: To ice at home.   [] GAME READY (VASO)- for significant edema, swelling, pain control.      Charges  Timed Code Treatment Minutes: 55   Total Treatment Minutes: 54'         [] EVAL (LOW) 90017   [] EVAL (MOD) 99585  [] EVAL (HIGH) 63020   [] RE-EVAL [x] ZP(08137) x1    [] IONTO  [x] NMR (38527) x   2  [] VASO  [] Manual (64146) x      [x] Other: BFR  [] TA x      [] Mech Traction (71335)  [] ES(attended) (14757)      [] ES (un) (47256):         GOALS:  Patient stated goal: return to walking  []? Progressing: [x]? Met: []? Not Met: []? Adjusted     Therapist goals for Patient:   Short Term Goals: To be achieved in: 2 weeks  1. Independent in HEP and progression per patient tolerance, in order to prevent re-injury. []? Progressing: [x]? Met: []? Not Met: []? Adjusted  2. Patient will have a decrease in pain to facilitate improvement in movement, function, and ADLs as indicated by Functional Deficits. []? Progressing: [x]? Met: []? Not Met: []? Adjusted     Long Term Goals: To be achieved in: 12 weeks  1. Disability index score of 69% or more per FOTO to assist with reaching prior level of function. [x]? Progressing: []? Met: []? Not Met: []? Adjusted  2. Patient will demonstrate increased AROM to 0-130 R knee or better to allow for proper joint functioning as indicated by patients Functional Deficits. []? Progressing: [x]? Met: []? Not Met: []? Adjusted  3. Patient will demonstrate an increase in Strength to good proximal hip strength and control, within 5lb HHD in LE to allow for proper functional mobility as indicated by patients Functional Deficits. [x]? Progressing: []? Met: []? Not Met: []? Adjusted  4. Patient will return to walking with assistive device with normalize gait pattern functional activities without increased symptoms or restriction. [x]? Progressing: []? Met: []? Not Met: []? Adjusted  5. Reciprocal gait with stairs (patient specific functional goal)    []? Progressing: []? Met: [x]? Not Met: []? Adjusted       Progression Towards Functional goals:  [x] Patient is progressing as expected towards functional goals listed. [] Progression is slowed due to complexities listed.   [] Progression has been slowed due to co-morbidities. [] Plan just implemented, too soon to assess goals progression  [] Other:         Overall Progression Towards Functional goals/ Treatment Progress Update:  [x] Patient is progressing as expected towards functional goals listed. [] Progression is slowed due to complexities/Impairments listed. [] Progression has been slowed due to co-morbidities. [] Plan just implemented, too soon to assess goals progression <30days   [] Goals require adjustment due to lack of progress  [] Patient is not progressing as expected and requires additional follow up with physician  [] Other    Prognosis for POC: [x] Good [] Fair  [] Poor      Patient requires continued skilled intervention: [x] Yes  [] No    Treatment/Activity Tolerance:  [x] Patient able to complete treatment  [] Patient limited by fatigue  [] Patient limited by pain    [] Patient limited by other medical complications  [] Other:     ASSESSMENT: Patient presents with R knee pain, limited ROM, strength deficits, gait impairments as expected post-surgical procedure. Noted deficits in R functional DF and pronation with excessive supination/tightness in STJ which improved with manual interventions . Improved ankle/foot AROM carried over into better hip/knee alignment with FWD step-ups on R LE with less compensation. Patient progressing well with ROM/strength. Increased BFR lto 80% tthis visit with  fatigue. Reports minimal to no pain. Patient making progress toward goals. Pt requires skilled intervention to restore ROM, strength, functional endurance and balance in order to perform ADLs without significant symptoms or limitations.    Return to Play: (if applicable)   []  Stage 1: Intro to Strength   []  Stage 2: Return to Run and Strength   []  Stage 3: Return to Jump and Strength   []  Stage 4: Dynamic Strength and Agility   []  Stage 5: Sport Specific Training     []  Ready to Return to Play, Meets All Above Stages   []  Not Ready for Return to Sports   Comments:                               PLAN: See eval. Progress per protocol for meniscus repair. Progress CKC as tolerated per protocol. [x] Continue per plan of care [] Alter current plan (see comments above)  [] Plan of care initiated [] Hold pending MD visit [] Discharge      Electronically signed by:  [place] [id number]    Note: If patient does not return for scheduled/ recommended follow up visits, this note will serve as a discharge from care along with most recent update on progress.

## 2022-07-01 ENCOUNTER — HOSPITAL ENCOUNTER (OUTPATIENT)
Dept: PHYSICAL THERAPY | Age: 37
Setting detail: THERAPIES SERIES
Discharge: HOME | End: 2022-07-01
Payer: COMMERCIAL

## 2022-07-01 PROCEDURE — 97112 NEUROMUSCULAR REEDUCATION: CPT

## 2022-07-01 PROCEDURE — 97110 THERAPEUTIC EXERCISES: CPT

## 2022-07-01 NOTE — FLOWSHEET NOTE
[place], [address] [facility], [address]  Phone: [phone]   Fax: [phone]    Physical Therapy Treatment Note/ Progress Report:           Date:  2022    Patient Name:  Danny Hay    :  1985  MRN: [id number]  Restrictions/Precautions:  50% WB Brace locked in full extension for WB 0-2 weeks, 0-90 ROM in NWB  Medical/Treatment Diagnosis Information:  · Diagnosis: S83.211A (ICD-10-CM) - Bucket-handle tear of medial meniscus of right knee as current injury, initial encounter  · Treatment Diagnosis: 83.211D (ICD-10-CM) - Bucket-handle tear of medial meniscus of right knee as current injury, R knee pain M25.561, R knee effusion M25.461 s/p MM Repair on 22.   Insurance/Certification information:  PT Insurance Information: [address] 30 visit / Chivo Savage after 30       Physician Information:   [facility]   Has the plan of care been signed (Y/N):        []  Yes  [x]  No     Date of Patient follow up with Physician: 22      Is this a Progress Report:     []  Yes  [x]  No        If Yes:  Date Range for reporting period:  Beginnin22  Ending:     Progress report will be due (10 Rx or 30 days whichever is less):       Recertification will be due (POC Duration  / 90 days whichever is less): 12 weeks      Visit # Insurance Allowable Auth Required   In-person 10 30 visit / Curly Pyo after 30      []  Yes []  No    Telehealth   []  Yes []  No    Total          Functional Scale: FOTO: 43/100    Date assessed:  22      Therapy Diagnosis/Practice Pattern:4I      Number of Comorbidities:  []0     [x]1-2    []3    Latex Allergy:  [x]NO      []YES  Preferred Language for Healthcare:   [x]English       []other:    8 weeks   post-op 22    Pain level:  0/10     SUBJECTIVE:  Reports mils soreness in [facility] the day after PT session but has since subsided. She is now 8 weeks post-op and per protocol ok for stationary bike. OBJECTIVE:      Observation:    Test measurements:   Upgraded to stationary bike/leg press; noted decreased medial femoral rotation with walking this dtae  OBJECTIVE  Test used 5/11/22 6/8/22   Pain Summary  2-5/10 0/10   Functional questionnaire FOTO 43% 59%   Functional Testing MP girth L/R 36/36.8cm 35.8/34.5    Quad girth 15 cm above SP R/L NT 46.8cm/49.8 cm    Knee flex  85-90 R135 this visit/L 159   ROM Knee ext R/L -5/+10 +10/+10    Hip flex R/L  4-/4+   Strength Quad tone 3+/5 4-/5    Hip abd NT 4, 4+    SLR R/L IND 4-, 4    RESTRICTIONS/PRECAUTIONS:  2-6 weeks post-op 0-90 with brace. D/C crutches after 4 weeks. Brace d/c after 6 weeks. ROM WFL as tolerated in NWB  No WB with flexion >90. ( 8 weeks). Leg press 0-90  And bike at 8 week post-op. Previous ACL/ MMR  May 2021 from Dr. Micaela Majano. See scanned in protocol in media. Exercises/Interventions:      BFR Smart Phase Protocol                    BFR Session ? 1 min rest period between each set of repetitions.   2 Min rest/reperfusion between    each exercises protocol perfromed                      BFR Locations R thigh BFR set at: 164 mmHg 80%                   Protocol: 30/15/15/15           Exercises:   Reps 1 min Reps 1 min Reps 1 min Reps Notes                SLR fllexion  Seated HOB elevated # of reps required  30 Rest 15 Rest 15 Rest 15     completed  30          SLR AB reps required  15  15  15  15     completed  15          SLR prone hip extension reps required  15  15  15  15     completed 15          Step-ups FWD 6\" Reps required 15           completed  15          LAQ 3# reps required  15  15  15  15     completed  15                              Therapeutic Ex (97388) Sets/sec Reps Notes/CUES   QS/AP with NMR HEP   SLR with BFR H5 30 reps HEP   LAQ 3# with BFR    GS with strap h30 5 HEP   HS stretches in sitting H30 5 HEP   Heel slides  With strap H10 10 147 6/22/22   Knee ext propping with GS stretches and QS     Standing knee flexion    Sitting knee flexion off EOB SL hip abd with BFR HEP   Side -stepping  With band YTB loop      SL clams with TB H5  Yellow TB + to HEP   Standing HR H5 3x10 reps  HEP   Standing TKE green    Prone TKE H5    TG wall squats 0-80 range  With ball squeeze SB wall squats 0-60 range H5  2x10 reps    Prone hip ext/Prone knee flexion Standing B Gastroc stretches H 20\" 2 B 6/1   Standing functional ankle DF/Fwd WS R 6/29 ; after manual ankle/foot mobs   Supine DF/PF ankle AROM  6/29   Lifecycle stationary Bike Level 1 6' 7/1   Standing R Soleus Stretches H20\" 2 R 628         Prone HS curls 0#, 1# 1, 2 7/1   Leg Press B Squats 0-90 only 50# 2 X10  (7/1)     Manual Intervention (71078)      Patellar mpbs                  NMR re-education (76340)   CUES NEEDED   SB bridges H5 3x10 reps    SLS R LE  On Airex EO H10 3 reps    [place] ES with QS/SLR Static Standing  Airex :  Tandem  EO  EC  Head nods  Head turns         20\"X2  20\" X2  1 x8 L/R  1 X8 L/R    See BFR protocol below X15'  6/29         Cone taps in standing Fatigued at end   Standing Plank hip ext/multifidus Over Wedge 1 30 L/R 7/1; HEP         Therapeutic Activity (50883)      FSU/LSU 6\"  With CL march H2 2x10 6/29: needed tactile/manual cues to avoid L femoral /medial rotation                     MANUAL      Ankle mobs TC and STJ 6/29:   Calcaneal PROM IN?EV     Manual achilles stretches    Streches at navicular and STJ into ,functional pronation          IASTM prone toR  HG7 X10 strokes superior and inferior' HS only  X2'       [facility] BFR Smart Phase Protocol       Spoke with Dr. Wiley Calle regarding the use of BFR with patient.  Ok per MD   BFR Session  2             [facility] Personalized Tourniquet System for BFR     LE: up to 80% LOP 70%      UE: up to 50% LOP        BFR Location:   BFR set at:  mmHg Upper Quad  119   lb            Protocol: 30/15/15/15 Exercises:   Reps 30 sec Notes           SLR # of reps required  30 Rest     completed  30     Seated LAQ  reps required  30 Rest 3# wt    completed  30     FSU 4\" reps required  30 Rest     completed  30     Standing mini squats to 45 deg reps required  30 Rest     completed  30               HEP instruction: (see scanned forms)  Access Code: [email or web address]  URL: [email or web address]  Date: 05/11/2022  Prepared by: Joy Vega     Exercises  Long Sitting Ankle Pumps - 5-7 x daily - 7 x weekly - 3 sets - 10 reps - 1-2 hold  Long Sitting Quad Set - 5-7 x daily - 7 x weekly - 1 sets - 10 reps - 10 hold  Long Sitting Calf Stretch with Strap - 2 x daily - 7 x weekly - 1 sets - 5 reps - 30\" hold  Seated Table Hamstring Stretch - 2 x daily - 7 x weekly - 1 sets - 5 reps - 30\" hold  Supine Straight Leg Raises - 2 x daily - 7 x weekly - 2 sets - 10 reps  Sitting Heel Slide with Towel - 2 x daily - 7 x weekly - 1 sets - 10 reps - 10\" hold    Access Code: [email or web address]  URL: [email or web address]  Date: 05/23/2022  Prepared by: Joy Vega    Exercises ; WB exercises with brace on. Standing Hip Abduction with Counter Support - 1 x daily - 7 x weekly - 2-3 sets - 10 reps - 2 hold  Heel Toe Raises with Counter Support - 1 x daily - 7 x weekly - 2-3 sets - 10 reps  Prone Hip Extension - 2 x daily - 7 x weekly - 10 reps - 2-3 sets - 5 hold  Seated Long Arc Quad - 2 x daily - 7 x weekly - 10 reps - 3 sets - 1 second hold  Side Leg Lifts - 2 x daily - 7 x weekly - 10 reps - 2 sets - 5\" hold    SupiAccess Code: [email or web address]  URL: [email or web address]  Date: 07/01/2022  Prepared by:  [facility]    Exercises  Prone Hip Extension on Table - 1 x daily - 7 x weekly - 3 sets - 10 reps  ne Heel Slide with Strap - 2 x daily - 7 x weekly - 10 reps - 3 sets - 10\" hold     Therapeutic Exercise and NMR EXR  [x] (14105) Provided verbal/tactile cueing for activities related to strengthening, flexibility, endurance, ROM for improvements in LE, proximal hip, and core control with self care, mobility, lifting, ambulation.  [] (50983) Provided verbal/tactile cueing for activities related to improving balance, coordination, kinesthetic sense, posture, motor skill, proprioception  to assist with LE, proximal hip, and core control in self care, mobility, lifting, ambulation and eccentric single leg control. NMR and Therapeutic Activities:    [x] (95970 or 65694) Provided verbal/tactile cueing for activities related to improving balance, coordination, kinesthetic sense, posture, motor skill, proprioception and motor activation to allow for proper function of core, proximal hip and LE with self care and ADLs  [] (53394) Gait Re-education- Provided training and instruction to the patient for proper LE, core and proximal hip recruitment and positioning and eccentric body weight control with ambulation re-education including up and down stairs     Home Exercise Program:    [x] (56159) Reviewed/Progressed HEP activities related to strengthening, flexibility, endurance, ROM of core, proximal hip and LE for functional self-care, mobility, lifting and ambulation/stair navigation   [] (24161)Reviewed/Progressed HEP activities related to improving balance, coordination, kinesthetic sense, posture, motor skill, proprioception of core, proximal hip and LE for self care, mobility, lifting, and ambulation/stair navigation      Manual Treatments:  PROM / STM / Oscillations-Mobs:  G-I, II, III, IV (PA's, Inf., Post.)  [x] (51960) Provided manual therapy to mobilize LE, proximal hip and/or LS spine soft tissue/joints for the purpose of modulating pain, promoting relaxation,  increasing ROM, reducing/eliminating soft tissue swelling/inflammation/restriction, improving soft tissue extensibility and allowing for proper ROM for normal function with self care, mobility, lifting and ambulation. Modalities:   To ice at home.   [] GAME READY (VASO)- for significant edema, swelling, pain control. Charges  Timed Code Treatment Minutes: 55   Total Treatment Minutes: 54'         [] EVAL (LOW) 455 1011   [] EVAL (MOD) 77702  [] EVAL (HIGH) 54694   [] RE-EVAL     [x] PS(52382) x2    [] IONTO  [x] NMR (27752) x   2  [] VASO  [] Manual (65396) x      [x] Other: BFR  [] TA x      [] Mech Traction (22671)  [] ES(attended) (99321)      [] ES (un) (90777):         GOALS:  Patient stated goal: return to walking  []? Progressing: [x]? Met: []? Not Met: []? Adjusted     Therapist goals for Patient:   Short Term Goals: To be achieved in: 2 weeks  1. Independent in HEP and progression per patient tolerance, in order to prevent re-injury. []? Progressing: [x]? Met: []? Not Met: []? Adjusted  2. Patient will have a decrease in pain to facilitate improvement in movement, function, and ADLs as indicated by Functional Deficits. []? Progressing: [x]? Met: []? Not Met: []? Adjusted     Long Term Goals: To be achieved in: 12 weeks  1. Disability index score of 69% or more per FOTO to assist with reaching prior level of function. [x]? Progressing: []? Met: []? Not Met: []? Adjusted  2. Patient will demonstrate increased AROM to 0-130 R knee or better to allow for proper joint functioning as indicated by patients Functional Deficits. []? Progressing: [x]? Met: []? Not Met: []? Adjusted  3. Patient will demonstrate an increase in Strength to good proximal hip strength and control, within 5lb HHD in LE to allow for proper functional mobility as indicated by patients Functional Deficits. [x]? Progressing: []? Met: []? Not Met: []? Adjusted  4. Patient will return to walking with assistive device with normalize gait pattern functional activities without increased symptoms or restriction. [x]? Progressing: []? Met: []? Not Met: []? Adjusted  5. Reciprocal gait with stairs (patient specific functional goal)    []? Progressing: []? Met: [x]? Not Met: []?  Adjusted       Progression Towards Functional goals:  [x] Patient is progressing as expected towards functional goals listed. [] Progression is slowed due to complexities listed. [] Progression has been slowed due to co-morbidities. [] Plan just implemented, too soon to assess goals progression  [] Other:         Overall Progression Towards Functional goals/ Treatment Progress Update:  [x] Patient is progressing as expected towards functional goals listed. [] Progression is slowed due to complexities/Impairments listed. [] Progression has been slowed due to co-morbidities. [] Plan just implemented, too soon to assess goals progression <30days   [] Goals require adjustment due to lack of progress  [] Patient is not progressing as expected and requires additional follow up with physician  [] Other    Prognosis for POC: [x] Good [] Fair  [] Poor      Patient requires continued skilled intervention: [x] Yes  [] No    Treatment/Activity Tolerance:  [x] Patient able to complete treatment  [] Patient limited by fatigue  [] Patient limited by pain    [] Patient limited by other medical complications  [] Other:     ASSESSMENT: Patient presents with R knee pain, limited ROM, strength deficits, gait impairments as expected post-surgical procedure. Noted improvements in  R functional DF and pronation this date and less femoral medial rotation at beginning of PT session. Patient still with pelvic rotation/ compensation with R FWD step-ups initially but improved with tactile cues. Patient does have weakness in R multifidus with decreased co-activation of gluteals/hip AB/multifidus and fatigued after 20 reps of standing on R leg with added standing hip extension/plank multifidus,  Patient progressing well with ROM/strength. Continued BFR lat  80% tthis visit with  fatigue. Reports minimal to no pain. Patient making progress toward goals and since 8 weeks added bike and leg press 0-90 to fatigue.     Pt requires skilled intervention to restore ROM, strength, functional endurance and balance in order to perform ADLs without significant symptoms or limitations. Return to Play: (if applicable)   []  Stage 1: Intro to Strength   []  Stage 2: Return to Run and Strength   []  Stage 3: Return to Jump and Strength   []  Stage 4: Dynamic Strength and Agility   []  Stage 5: Sport Specific Training     []  Ready to Return to Play, Meets All Above Stages   []  Not Ready for Return to Sports   Comments:                               PLAN: See eval. Progress per protocol for meniscus repair. Progress CKC as tolerated per protocol. [x] Continue per plan of care [] Alter current plan (see comments above)  [] Plan of care initiated [] Hold pending MD visit [] Discharge      Electronically [place] [id number]    Note: If patient does not return for scheduled/ recommended follow up visits, this note will serve as a discharge from care along with most recent update on progress.

## 2022-07-06 ENCOUNTER — HOSPITAL ENCOUNTER (OUTPATIENT)
Dept: PHYSICAL THERAPY | Age: 37
Setting detail: THERAPIES SERIES
Discharge: HOME | End: 2022-07-06
Payer: COMMERCIAL

## 2022-07-06 NOTE — FLOWSHEET NOTE
Physical Therapy  Cancellation/No-show Note  Patient Name:  Maddy Howell  :  1985   Date:  2022  Cancelled visits to date: 3  6/3, ,   No-shows to date: 1  6/15    Patient status for today's appointment patient:  [x]  Cancelled  []  Rescheduled appointment  []  No-show     Reason given by patient:  []  Patient ill  []  Conflicting appointment  []  No transportation    []  Conflict with work  []  No reason given  []  Other:     Comments:   not sure reason of cancellation. Phone call information:   []  Phone call made today to patient at _ time at number provided:      []  Patient answered, conversation as follows:    []  Patient did not answer, message left as follows:  []  Phone call not made today  [x]  Phone call not needed - pt contacted us to cancel and provided reason for cancellation.      Electronically signed by:  Joy Vega, Jade Esteves

## 2022-07-08 ENCOUNTER — HOSPITAL ENCOUNTER (OUTPATIENT)
Dept: PHYSICAL THERAPY | Age: 37
Setting detail: THERAPIES SERIES
Discharge: HOME | End: 2022-07-08
Payer: COMMERCIAL

## 2022-07-08 PROCEDURE — 97140 MANUAL THERAPY 1/> REGIONS: CPT

## 2022-07-08 PROCEDURE — 97110 THERAPEUTIC EXERCISES: CPT

## 2022-07-08 PROCEDURE — 97530 THERAPEUTIC ACTIVITIES: CPT

## 2022-07-08 PROCEDURE — 97112 NEUROMUSCULAR REEDUCATION: CPT | Performed by: PHYSICAL THERAPIST

## 2022-07-08 NOTE — FLOWSHEET NOTE
[facility], [address] [place] [address] [address], [address]  Phone: [phone]   Fax: [phone]    Physical Therapy Treatment Note/ Progress Report:           Date:  2022    Patient Name:  Rufina Wilde    :  1985  MRN: [id number]  Restrictions/Precautions:  50% WB Brace locked in full extension for WB 0-2 weeks, 0-90 ROM in NWB  Medical/Treatment Diagnosis Information:  · Diagnosis: S83.211A (ICD-10-CM) - Bucket-handle tear of medial meniscus of right knee as current injury, initial encounter  · Treatment Diagnosis: 83.211D (ICD-10-CM) - Bucket-handle tear of medial meniscus of right knee as current injury, R knee pain M25.561, R knee effusion M25.461 s/p MM Repair on 22.   Insurance/Certification information:  PT Insurance Information: [facility] [address] visit / Sai Pool after 30       Physician Information:   [facility] Pat   Has the plan of care been signed (Y/N):        []  Yes  [x]  No     Date of Patient follow up with Physician: 22      Is this a Progress Report:     [x]  Yes  []  No        If Yes:  Date Range for reporting period:  Beginnin2022  Ending:     Progress report will be due (10 Rx or 30 days whichever is less):Visit # 21 or       Recertification will be due (POC Duration  / 90 days whichever is less): 12 weeks/      Visit # Insurance Allowable Auth Required   In-person  visit / Sai Pool after 30      []  Yes []  No    Telehealth   []  Yes []  No    Total          Functional Scale: FOTO: 43/100    Date assessed:  22    Functional Scale FOTO : 67.2758    Date assessed:  22   Therapy Diagnosis/Practice Pattern:4I      Number of Comorbidities:  []0     [x]1-2    []3    Latex Allergy:  [x]NO      []YES  Preferred Language for Healthcare:   [x]English       []other:    9 weeks   post-op 22    Pain level:  0/10     SUBJECTIVE: Reports mils soreness in knee the day after PT session but has since subsided. She is now 9 weeks post-op . OBJECTIVE:      Observation:    Test measurements:   Upgraded to stationary bike/leg press; noted decreased medial femoral rotation with walking this dtae    OBJECTIVE  Test used 5/11/22 6/8 7/8/22     Pain Summary  2-5/10 0/10 0/10     Functional questionnaire FOTO 43% 59% 59%     Functional Testing MP girth L/R 36/36.8cm 35.8/34.5 35.8/34.5      Quad girth 15 cm above SP R/L NT 46.8cm/49.8 cm NT      Knee flex  85-90 R135 this visit/L 159 R135 this visit/L 159     ROM Knee ext R/L -5/+10 +10/+10 +1/+7      Hip flex R/L  4-/4+ /4 + B     Strength Quad tone 3+/5 4-/5 4+/5; R VMO Fair+      Hip abd NT 4, 4+ 4+, 4+      SLR R/L IND 4-, 4 4/, 4    RESTRICTIONS/PRECAUTIONS:  2-6 weeks post-op 0-90 with brace. D/C crutches after 4 weeks. Brace d/c after 6 weeks. ROM WFL as tolerated in NWB  No WB with flexion >90. ( 8 weeks). Leg press 0-90  And bike at 8 week post-op. Previous ACL/ MMR  May 2021 from Dr. Fabrice Hector. See scanned in protocol in media. Exercises/Interventions:      BFR Smart Phase Protocol                    BFR Session ? 1 min rest period between each set of repetitions.   2 Min rest/reperfusion between    each exercises protocol perfromed                      BFR Locations R thigh BFR set at: 161 mmHg 80%                   Protocol: 30/15/15/15           Exercises:   Reps 1 min Reps 1 min Reps 1 min Reps Notes                SLR fllexion  Seated HOB elevated # of reps required  30 Rest 15 Rest 15 Rest 15     completed  30          SLR AB reps required  30  15  15  15     completed  30          SLR prone hip extension reps required  30  15  15  15     completed 30          Step-ups FWD 6\" Reps required 15           completed  0          LAQ 3# reps required  20  15  15  15     completed  20                              Therapeutic Ex (55088) Sets/sec Reps Notes/CUES   QS/AP HG7 X10 strokes superior and inferior' quad only  X8'       Li BFR Smart Phase Protocol       Spoke with Dr. Travis Galindo regarding the use of BFR with patient. Ok per MD   BFR Session  2             [facility] Personalized Tourniquet System for BFR     LE: up to 80% LOP 80%      UE: up to 50% LOP        BFR Location:   BFR set at:  mmHg Upper Quad  119   lb            Protocol: 30/15/15/15       Exercises:   Reps 30 sec Notes           SLR # of reps required  30 Rest     completed  30     Seated LAQ  reps required  30 Rest 3# wt    completed  30     FSU 4\" reps required  30 Rest     completed  30     Standing mini squats to 45 deg reps required  30 Rest     completed  30               HEP instruction: (see scanned forms)  Access Code: [email or web address]  URL: [email or web address]  Date: 05/11/2022  Prepared by: Rosario Richards     Exercises  Long Sitting Ankle Pumps - 5-7 x daily - 7 x weekly - 3 sets - 10 reps - 1-2 hold  Long Sitting Quad Set - 5-7 x daily - 7 x weekly - 1 sets - 10 reps - 10 hold  Long Sitting Calf Stretch with Strap - 2 x daily - 7 x weekly - 1 sets - 5 reps - 30\" hold  Seated Table Hamstring Stretch - 2 x daily - 7 x weekly - 1 sets - 5 reps - 30\" hold  Supine Straight Leg Raises - 2 x daily - 7 x weekly - 2 sets - 10 reps  Sitting Heel Slide with Towel - 2 x daily - 7 x weekly - 1 sets - 10 reps - 10\" hold    Access Code: [email or web address]  URL: [email or web address]  Date: 05/23/2022  Prepared by: Rosario Rocker    Exercises ; WB exercises with brace on.    Standing Hip Abduction with Counter Support - 1 x daily - 7 x weekly - 2-3 sets - 10 reps - 2 hold  Heel Toe Raises with Counter Support - 1 x daily - 7 x weekly - 2-3 sets - 10 reps  Prone Hip Extension - 2 x daily - 7 x weekly - 10 reps - 2-3 sets - 5 hold  Seated Long Arc Quad - 2 x daily - 7 x weekly - 10 reps - 3 sets - 1 second hold  Side Leg Lifts - 2 x daily - 7 x weekly - 10 reps - 2 sets - 5\" hold    SupiAccess Code: [email or web address]  URL: [email or web address]  Date: 07/01/2022  Prepared by: [facility]    Exercises  Prone Hip Extension on Table - 1 x daily - 7 x weekly - 3 sets - 10 reps  ne Heel Slide with Strap - 2 x daily - 7 x weekly - 10 reps - 3 sets - 10\" hold     Therapeutic Exercise and NMR EXR  [x] (30256) Provided verbal/tactile cueing for activities related to strengthening, flexibility, endurance, ROM for improvements in LE, proximal hip, and core control with self care, mobility, lifting, ambulation.  [] (47373) Provided verbal/tactile cueing for activities related to improving balance, coordination, kinesthetic sense, posture, motor skill, proprioception  to assist with LE, proximal hip, and core control in self care, mobility, lifting, ambulation and eccentric single leg control.      NMR and Therapeutic Activities:    [x] (95667 or 60679) Provided verbal/tactile cueing for activities related to improving balance, coordination, kinesthetic sense, posture, motor skill, proprioception and motor activation to allow for proper function of core, proximal hip and LE with self care and ADLs  [] (42288) Gait Re-education- Provided training and instruction to the patient for proper LE, core and proximal hip recruitment and positioning and eccentric body weight control with ambulation re-education including up and down stairs     Home Exercise Program:    [x] (29351) Reviewed/Progressed HEP activities related to strengthening, flexibility, endurance, ROM of core, proximal hip and LE for functional self-care, mobility, lifting and ambulation/stair navigation   [] (68360)Reviewed/Progressed HEP activities related to improving balance, coordination, kinesthetic sense, posture, motor skill, proprioception of core, proximal hip and LE for self care, mobility, lifting, and ambulation/stair navigation      Manual Treatments:  PROM / STM / Oscillations-Mobs:  G-I, II, III, IV (PA's, Inf., Post.)  [x] (68422) Provided manual therapy to mobilize LE, proximal hip and/or LS spine soft tissue/joints for the purpose of modulating pain, promoting relaxation,  increasing ROM, reducing/eliminating soft tissue swelling/inflammation/restriction, improving soft tissue extensibility and allowing for proper ROM for normal function with self care, mobility, lifting and ambulation. Modalities: To ice at home.   [] GAME READY (VASO)- for significant edema, swelling, pain control. Charges  Timed Code Treatment Minutes: 55   Total Treatment Minutes: 54'         [] EVAL (LOW) 455 1011   [] EVAL (MOD) 55065  [] EVAL (HIGH) 88438   [] RE-EVAL     [x] KM(55259) x1    [] IONTO  [x] NMR (40063) x  1  [] VASO  [x] Manual (09159) x 1     [x] Other: BFR  [x] TA x  1    [] Mech Traction (23841)  [] ES(attended) (55780)      [] ES (un) (21049):         GOALS:  Patient stated goal: return to walking  []? Progressing: [x]? Met: []? Not Met: []? Adjusted     Therapist goals for Patient:   Short Term Goals: To be achieved in: 2 weeks  1. Independent in HEP and progression per patient tolerance, in order to prevent re-injury. []? Progressing: [x]? Met: []? Not Met: []? Adjusted  2. Patient will have a decrease in pain to facilitate improvement in movement, function, and ADLs as indicated by Functional Deficits. []? Progressing: [x]? Met: []? Not Met: []? Adjusted     Long Term Goals: To be achieved in: 12 weeks  1. Disability index score of 69% or more per FOTO to assist with reaching prior level of function. ; 67.28 grossly met  []? Progressing: [x]? Met: []? Not Met: []? Adjusted  2. Patient will demonstrate increased AROM to 0-130 R knee or better to allow for proper joint functioning as indicated by patients Functional Deficits. []? Progressing: [x]? Met: []? Not Met: []? Adjusted  3.  Patient will demonstrate an increase in Strength to good proximal hip strength and control, within 5lb HHD in LE to allow for proper functional mobility as indicated by patients Functional Deficits. [x]? Progressing: []? Met: []? Not Met: []? Adjusted  4. Patient will return to walking with assistive device with normalize gait pattern functional activities without increased symptoms or restriction. Mild lumbppelvic rotation  [x]? Progressing: []? Met: []? Not Met: []? Adjusted  5. Reciprocal gait with stairs (patient specific functional goal)    []? Progressing: []? Met: [x]? Not Met: []? Adjusted       Progression Towards Functional goals:  [x] Patient is progressing as expected towards functional goals listed. [] Progression is slowed due to complexities listed. [] Progression has been slowed due to co-morbidities. [] Plan just implemented, too soon to assess goals progression  [] Other:         Overall Progression Towards Functional goals/ Treatment Progress Update:  [x] Patient is progressing as expected towards functional goals listed. [] Progression is slowed due to complexities/Impairments listed. [] Progression has been slowed due to co-morbidities. [] Plan just implemented, too soon to assess goals progression <30days   [] Goals require adjustment due to lack of progress  [] Patient is not progressing as expected and requires additional follow up with physician  [] Other    Prognosis for POC: [x] Good [] Fair  [] Poor      Patient requires continued skilled intervention: [x] Yes  [] No    Treatment/Activity Tolerance:  [x] Patient able to complete treatment  [] Patient limited by fatigue  [] Patient limited by pain    [] Patient limited by other medical complications  [] Other:     ASSESSMENT: Patient presents with R knee pain, limited ROM, strength deficits, gait impairments as expected post-surgical procedure. Noted improvements in  R LE quad/Hip Abductor strength, but some VMO and Hip Adductor weakness presented.   Patient has met her STGS, 1 LTG, and is progressing towards remaining LTGs     Patient has R posterior SI innominate with some continued weakness in R multifidus  , Patient progressing well with ROM/strength. Continued BFR lat  80% tthis visit with  fatigue. Reports minimal to no pain. Patient making progress toward goals and since 8 weeks added bike and leg press 0-90 to fatigue. Pt requires skilled intervention to restore ROM, strength, functional endurance and balance in order to perform ADLs without significant symptoms or limitations. Return to Play: (if applicable)   []  Stage 1: Intro to Strength   []  Stage 2: Return to Run and Strength   []  Stage 3: Return to Jump and Strength   []  Stage 4: Dynamic Strength and Agility   []  Stage 5: Sport Specific Training     []  Ready to Return to Play, Meets All Above Stages   []  Not Ready for Return to Sports   Comments:                               PLAN: See eval. Progress per protocol for meniscus repair. Progress CKC as tolerated per protocol. [x] Continue per plan of care [] Alter current plan (see comments above)  [] Plan of care initiated [] Hold pending MD visit [] Discharge      Electronically signed by:  Kristen Key [address]  Dannie Powers [place] [id number]    Note: If patient does not return for scheduled/ recommended follow up visits, this note will serve as a discharge from care along with most recent update on progress.

## 2022-07-13 ENCOUNTER — HOSPITAL ENCOUNTER (OUTPATIENT)
Dept: PHYSICAL THERAPY | Age: 37
Setting detail: THERAPIES SERIES
Discharge: HOME | End: 2022-07-13
Payer: COMMERCIAL

## 2022-07-13 PROCEDURE — 97140 MANUAL THERAPY 1/> REGIONS: CPT | Performed by: PHYSICAL THERAPIST

## 2022-07-13 PROCEDURE — 97110 THERAPEUTIC EXERCISES: CPT | Performed by: PHYSICAL THERAPIST

## 2022-07-13 PROCEDURE — 97112 NEUROMUSCULAR REEDUCATION: CPT | Performed by: PHYSICAL THERAPIST

## 2022-07-13 NOTE — FLOWSHEET NOTE
[place], [address] [facility], [address]  Phone: [phone]   Fax: [phone]    Physical Therapy Treatment Note/ Progress Report:           Date:  2022    Patient Name:  Nic Thornton    :  1985  MRN: [id number]  Restrictions/Precautions:  50% WB Brace locked in full extension for WB 0-2 weeks, 0-90 ROM in NWB  Medical/Treatment Diagnosis Information:  · Diagnosis: S83.211A (ICD-10-CM) - Bucket-handle tear of medial meniscus of right knee as current injury, initial encounter  · Treatment Diagnosis: 83.211D (ICD-10-CM) - Bucket-handle tear of medial meniscus of right knee as current injury, R knee pain M25.561, R knee effusion M25.461 s/p MM Repair on 22.   Insurance/Certification information:  PT Insurance Information: [address] 30 visit / Deepali Valiente after        Physician Information:   Nawaf Pierce   Has the plan of care been signed (Y/N):        []  Yes  [x]  No     Date of Patient follow up with Physician: 22      Is this a Progress Report:     []  Yes  [x]  No        If Yes:  Date Range for reporting period:  Beginnin22  Ending:     Progress report will be due (10 Rx or 30 days whichever is less):Visit # 21 or       Recertification will be due (POC Duration  / 90 days whichever is less): 12 weeks/      Visit # Insurance Allowable Auth Required   In-person  30 visit / Deepali Valiente after 30      []  Yes []  No    Telehealth   []  Yes []  No    Total          Functional Scale: FOTO: 43/100    Date assessed:  22    Functional Scale FOTO : 67.2758    Date assessed:  22   Therapy Diagnosis/Practice Pattern:4I      Number of Comorbidities:  []0     [x]1-2    []3    Latex Allergy:  [x]NO      []YES  Preferred Language for Healthcare:   [x]English       []other:    9 weeks   post-op 22    Pain level:  0/10     SUBJECTIVE: flexion stretches  ? N.V. Heel slides  With strap 147 6/22/22   Knee ext propping with GS stretches and QS     Standing knee flexion H5 2x10    Sitting knee flexion off EOB SL hip abd with BFR HEP   Side -stepping  With band BTB loop  3 laps    Yellow TB + to HEP   SL plank with clams H5 2x10 reps HEP   Prone planks H30 2 reps HEP   Standing HR eccentrics H5 2x10 reps  New 7/13 HEP   Standing TKE green    Prone TKE H5    TG wall squats 0-80 range  With ball squeeze SB wall squats 0-60 range R LE with L heel on floor H5  2x10 reps    Prone hip ext/Prone knee flexion Standing B Gastroc stretches H 20\" 2 B 6/1   Standing functional ankle DF/Fwd WS R 6/29 ; after manual ankle/foot mobs   Supine DF/PF ankle AROM  6/29   Lifecycle stationary Bike Level 1 6' 7/1   Standing R Soleus Stretches 628   RDL mini with cone pick-up using chair  2x10 Easily fatigued   Prone HS curls Resume next session R SLR Hip Add  Leg Press B Squats 0-90 only/eccentrics 50# R LE 70# 70# 3 X10/ 50# 2x10      Manual Intervention (90558)      Patellar mpbs                  NMR re-education (70102)   CUES NEEDED   SB bridges H5 3x10 reps   SLS R LE  On Airex EO H10 3 reps    [place] ES with QS/SLR Static Standing  Airex :  Tandem  EO  EC  Head nods  Head turns         20\"X2  20\" X2  1 x8 L/R  1 X8 L/R    See BFR protocol below       Cone taps in standing Fatigued at end   Standing Plank hip ext/multifidus Over Wedge 7/1; HEP   Supine B quad/hip ad isos        Therapeutic Activity (41480)      FSU/LSU 6\"  With CL march 6/29: needed tactile/manual cues to avoid L femoral /medial rotation   LSD  4\" step Attempted but held due to PF popping    7/8: Time spent reassessing strength, ROM, and progress towards goals. Discussed remaining part of PT POC. Progress note written.  X10'                 MANUAL      Ankle mobs TC and STJ 6/29:   Calcaneal PROM IN?EV     Manual achilles stretches    MET to correct:      Streches at navicular and STJ into ,functional pronation    Patellar mobs all directions Grade 3 2'    IASTM R  Hamstring muscle belly  X8'       Li BFR Smart Phase Protocol               HEP instruction: (see scanned forms)  Access Code: [email or web address]  URL: [email or web address]  Date: 05/11/2022  Prepared by: Kyle Fitzpatrick     Exercises  Long Sitting Ankle Pumps - 5-7 x daily - 7 x weekly - 3 sets - 10 reps - 1-2 hold  Long Sitting Quad Set - 5-7 x daily - 7 x weekly - 1 sets - 10 reps - 10 hold  Long Sitting Calf Stretch with Strap - 2 x daily - 7 x weekly - 1 sets - 5 reps - 30\" hold  Seated Table Hamstring Stretch - 2 x daily - 7 x weekly - 1 sets - 5 reps - 30\" hold  Supine Straight Leg Raises - 2 x daily - 7 x weekly - 2 sets - 10 reps  Sitting Heel Slide with Towel - 2 x daily - 7 x weekly - 1 sets - 10 reps - 10\" hold    Access Code: [email or web address]  URL: [email or web address]  Date: 05/23/2022  Prepared by: Kyle Fitzpatrick    Exercises ; WB exercises with brace on. Standing Hip Abduction with Counter Support - 1 x daily - 7 x weekly - 2-3 sets - 10 reps - 2 hold  Heel Toe Raises with Counter Support - 1 x daily - 7 x weekly - 2-3 sets - 10 reps  Prone Hip Extension - 2 x daily - 7 x weekly - 10 reps - 2-3 sets - 5 hold  Seated Long Arc Quad - 2 x daily - 7 x weekly - 10 reps - 3 sets - 1 second hold  Side Leg Lifts - 2 x daily - 7 x weekly - 10 reps - 2 sets - 5\" hold    Access Code: [email or web address]  URL: [email or web address]  Date: 07/13/2022  Prepared by: Kyle Fitzpatrick    Exercises  Standing Eccentric Heel Raise - 1 x daily - 7 x weekly - 3 sets - 10 reps - 2 hold  Standard Plank - 1 x daily - 7 x weekly - 3 sets - 3 reps - 30 hold  Side Plank with Clam and Resistance - 1 x daily - 7 x weekly - 2 sets - 15 reps - 5 hold  Forward T - 1 x daily - 7 x weekly - 3 sets - 10 reps - 5 hold     Therapeutic Exercise and NMR EXR  [x] (94162) Provided verbal/tactile cueing for activities related to strengthening, flexibility, endurance, ROM for improvements in LE, proximal hip, and core control with self care, mobility, lifting, ambulation.  [] (56799) Provided verbal/tactile cueing for activities related to improving balance, coordination, kinesthetic sense, posture, motor skill, proprioception  to assist with LE, proximal hip, and core control in self care, mobility, lifting, ambulation and eccentric single leg control. NMR and Therapeutic Activities:    [x] (39222 or 93176) Provided verbal/tactile cueing for activities related to improving balance, coordination, kinesthetic sense, posture, motor skill, proprioception and motor activation to allow for proper function of core, proximal hip and LE with self care and ADLs  [] (60494) Gait Re-education- Provided training and instruction to the patient for proper LE, core and proximal hip recruitment and positioning and eccentric body weight control with ambulation re-education including up and down stairs     Home Exercise Program:    [x] (42883) Reviewed/Progressed HEP activities related to strengthening, flexibility, endurance, ROM of core, proximal hip and LE for functional self-care, mobility, lifting and ambulation/stair navigation   [] (33982)Reviewed/Progressed HEP activities related to improving balance, coordination, kinesthetic sense, posture, motor skill, proprioception of core, proximal hip and LE for self care, mobility, lifting, and ambulation/stair navigation      Manual Treatments:  PROM / STM / Oscillations-Mobs:  G-I, II, III, IV (PA's, Inf., Post.)  [x] (11247) Provided manual therapy to mobilize LE, proximal hip and/or LS spine soft tissue/joints for the purpose of modulating pain, promoting relaxation,  increasing ROM, reducing/eliminating soft tissue swelling/inflammation/restriction, improving soft tissue extensibility and allowing for proper ROM for normal function with self care, mobility, lifting and ambulation. Modalities:   To ice at home.   [] GAME READY (VASO)- for significant edema, swelling, pain control. Charges  Timed Code Treatment Minutes: 55   Total Treatment Minutes: 54'         [] EVAL (LOW) 455 1011   [] EVAL (MOD) 86205  [] EVAL (HIGH) 01488   [] RE-EVAL     [x] YL(30401) x2   [] IONTO  [x] NMR (87103) x  1  [] VASO  [x] Manual (33646) x 1     [] Other: BFR  [] TA x     [] Mech Traction (77150)  [] ES(attended) (32165)      [] ES (un) (27072):         GOALS:  Patient stated goal: return to walking  []? Progressing: [x]? Met: []? Not Met: []? Adjusted     Therapist goals for Patient:   Short Term Goals: To be achieved in: 2 weeks  1. Independent in HEP and progression per patient tolerance, in order to prevent re-injury. []? Progressing: [x]? Met: []? Not Met: []? Adjusted  2. Patient will have a decrease in pain to facilitate improvement in movement, function, and ADLs as indicated by Functional Deficits. []? Progressing: [x]? Met: []? Not Met: []? Adjusted     Long Term Goals: To be achieved in: 12 weeks  1. Disability index score of 69% or more per FOTO to assist with reaching prior level of function. ; 67.28 grossly met  []? Progressing: [x]? Met: []? Not Met: []? Adjusted  2. Patient will demonstrate increased AROM to 0-130 R knee or better to allow for proper joint functioning as indicated by patients Functional Deficits. []? Progressing: [x]? Met: []? Not Met: []? Adjusted  3. Patient will demonstrate an increase in Strength to good proximal hip strength and control, within 5lb HHD in LE to allow for proper functional mobility as indicated by patients Functional Deficits. [x]? Progressing: []? Met: []? Not Met: []? Adjusted  4. Patient will return to walking with assistive device with normalize gait pattern functional activities without increased symptoms or restriction. Mild lumbppelvic rotation  [x]? Progressing: []? Met: []? Not Met: []? Adjusted  5. Reciprocal gait with stairs (patient specific functional goal)    []? Progressing: []? Met: [x]?  Not Met: []? Adjusted       Progression Towards Functional goals:  [x] Patient is progressing as expected towards functional goals listed. [] Progression is slowed due to complexities listed. [] Progression has been slowed due to co-morbidities. [] Plan just implemented, too soon to assess goals progression  [] Other:         Overall Progression Towards Functional goals/ Treatment Progress Update:  [x] Patient is progressing as expected towards functional goals listed. [] Progression is slowed due to complexities/Impairments listed. [] Progression has been slowed due to co-morbidities. [] Plan just implemented, too soon to assess goals progression <30days   [] Goals require adjustment due to lack of progress  [] Patient is not progressing as expected and requires additional follow up with physician  [] Other    Prognosis for POC: [x] Good [] Fair  [] Poor      Patient requires continued skilled intervention: [x] Yes  [] No    Treatment/Activity Tolerance:  [x] Patient able to complete treatment  [] Patient limited by fatigue  [] Patient limited by pain    [] Patient limited by other medical complications  [] Other:     ASSESSMENT: Patient presents with R knee pain, limited ROM, strength deficits, gait impairments as expected post-surgical procedure. Noted improvements in  R LE quad/Hip Abductor strength, but some VMO and Hip Adductor weakness presented. Patient has met her STGS, 1 LTG, and is progressing towards remaining LTGs       Patient progressing well with ROM/strength. Easily fatigued and challenged with new exercises. Assess response. Reports minimal to no pain. .    Pt requires skilled intervention to restore ROM, strength, functional endurance and balance in order to perform ADLs without significant symptoms or limitations.    Return to Play: (if applicable)   []  Stage 1: Intro to Strength   []  Stage 2: Return to Run and Strength   []  Stage 3: Return to Jump and Strength   []  Stage 4: Dynamic Strength and Agility   []  Stage 5: Sport Specific Training     []  Ready to Return to Play, Meets All Above Stages   []  Not Ready for Return to Sports   Comments:                               PLAN: See eval. Progress per protocol for meniscus repair. Progress CKC as tolerated per protocol. Progress hamstring strengthening as tolerated N.V.  [x] Continue per plan of care [] Alter current plan (see comments above)  [] Plan of care initiated [] Hold pending MD visit [] Discharge      Electronically signed by:  [place] [id number]      Note: If patient does not return for scheduled/ recommended follow up visits, this note will serve as a discharge from care along with most recent update on progress.

## 2022-07-15 ENCOUNTER — HOSPITAL ENCOUNTER (OUTPATIENT)
Dept: PHYSICAL THERAPY | Age: 37
Setting detail: THERAPIES SERIES
Discharge: HOME | End: 2022-07-15
Payer: COMMERCIAL

## 2022-07-15 PROCEDURE — 97110 THERAPEUTIC EXERCISES: CPT | Performed by: PHYSICAL THERAPIST

## 2022-07-15 PROCEDURE — 97112 NEUROMUSCULAR REEDUCATION: CPT | Performed by: PHYSICAL THERAPIST

## 2022-07-15 NOTE — FLOWSHEET NOTE
[place], [facility], [address]  Phone: [phone]   Fax: [phone]    Physical Therapy Treatment Note/ Progress Report:           Date:  7/15/2022    Patient Name:  Irma Crowe    :  1985  MRN: [id number]  Restrictions/Precautions:  50% WB Brace locked in full extension for WB 0-2 weeks, 0-90 ROM in NWB  Medical/Treatment Diagnosis Information:  Diagnosis: S83.211A (ICD-10-CM) - Bucket-handle tear of medial meniscus of right knee as current injury, initial encounter  Treatment Diagnosis: 83.211D (ICD-10-CM) - Bucket-handle tear of medial meniscus of right knee as current injury, R knee pain M25.561, R knee effusion M25.461 s/p MM Repair on 22.   Insurance/Certification information:  PT Insurance Information: [address] 30 visit / Tristan Garrett after 30       Physician Information:   Justin He   Has the plan of care been signed (Y/N):        []  Yes  [x]  No     Date of Patient follow up with Physician: 22      Is this a Progress Report:     []  Yes  [x]  No        If Yes:  Date Range for reporting period:  Beginnin22  Ending:     Progress report will be due (10 Rx or 30 days whichever is less):Visit # 21 or       Recertification will be due (POC Duration  / 90 days whichever is less): 12 weeks/      Visit # Insurance Allowable Auth Required   In-person 13 30 visit / Tristan Garrett after 30      []  Yes []  No    Telehealth   []  Yes []  No    Total          Functional Scale: FOTO: 43/100    Date assessed:  22    Functional Scale FOTO : 67.2758    Date assessed:  22   Therapy Diagnosis/Practice Pattern:4I      Number of Comorbidities:  []0     [x]1-2    []3    Latex Allergy:  [x]NO      []YES  Preferred Language for Healthcare:   [x]English       []other:    9 weeks   post-op 22    Pain level:  0/10     SUBJECTIVE:  Reports no issues from last visit from new exercises except muscle soreness, Reports still slightly sore upon arrival.  She is now 9 weeks post-op . OBJECTIVE:   Progressed CKC exercises this visit and HEP. Provided updated HEP. Held BFR this visit due to patient's transportation arrived early. Observation:   Test measurements:   Upgraded to stationary bike/leg press; noted decreased medial femoral rotation with walking this dtae    OBJECTIVE  Test used 5/11/22 6/8 7/8/22     Pain Summary  2-5/10 0/10 0/10     Functional questionnaire FOTO 43% 59% 59%     Functional Testing MP girth L/R 36/36.8cm 35.8/34.5 35.8/34.5      Quad girth 15 cm above SP R/L NT 46.8cm/49.8 cm NT      Knee flex  85-90 R135 this visit/L 159 R146 this visit/L 159     ROM Knee ext R/L -5/+10 +10/+10 +1/+7      Hip flex R/L  4-/4+ /4 + B     Strength Quad tone 3+/5 4-/5 4+/5; R VMO Fair+      Hip abd NT 4, 4+ 4+, 4+      SLR R/L IND 4-, 4 4/, 4    RESTRICTIONS/PRECAUTIONS:  2-6 weeks post-op 0-90 with brace. D/C crutches after 4 weeks. Brace d/c after 6 weeks. ROM WFL as tolerated in NWB  No WB with flexion >90. ( 8 weeks). Leg press 0-90  And bike at 8 week post-op. Previous ACL/ MMR  May 2021 from Dr. Colt Anders. See scanned in protocol in media. Exercises/Interventions:        BFR Session   1 min rest period between each set of repetitions. 2 Min rest/reperfusion between    each exercises protocol perfromed    BFR Locations R thigh   Protocol: 30/15/15/15 Exercises:     SLR fllexion  Seated HOB elevated # of reps required   completed  SLR AB reps required   completed  SLR prone hip extension reps required   completed Step-ups FWD 6\" Reps required  completed  LAQ 3# reps required  20  15  15  15     completed  20                              Therapeutic Ex (54052) Sets/sec Reps Notes/CUES   QS/AP with NMR HEP   SLR with BFR H5 30 reps HEP   LAQ 3# with BFR    GS with strap h30 5 HEP   HS stretches in sitting H30 5 HEP   Prone knee flexion stretches  ? N.V.    Heel slides  With strap 147 6/22/22   Knee ext propping with GS stretches and QS     Standing knee flexion H5 2x10    Sitting knee flexion off EOB SL hip abd with BFR HEP   Side -stepping  With band BTB loop/ Frw and Back Monster walks  3 laps HEP   Yellow TB + to HEP   SL plank with clams H5 2x10 reps HEP   Prone planks H30 2 reps HEP   Standing HR eccentrics/unilateral  H5 2x10 reps  New 7/13 HEP   Standing TKE green    Prone TKE H5    TG wall squats 0-80 range  With ball squeeze SB wall squats 0-60 range R LE with L heel on floor    Prone hip ext/Prone knee flexion Standing B Gastroc stretches H 20\" 2 B 6/1   Standing functional ankle DF/Fwd WS R 6/29 ; after manual ankle/foot mobs   Supine DF/PF ankle AROM  6/29   Lifecycle stationary Bike Level 1 6' 7/1   Standing R Soleus Stretches 628   RDL mini with cone pick-up using chair  2x10 Easily fatigued   Prone HS curls Resume next session R SLR Hip Add  HS curls  20# 3x10 reps    Knee ext 90-30  30# 3x10    Leg Press B Squats 0-90 only/eccentrics 50# R LE 70# 70# 3 X15/ 55# 2x10      Manual Intervention (81670)      Patellar mpbs                 NMR re-education (76799)   CUES NEEDED   Unilateral bridges H5 2x10 reps + to HEP    SB bridges    SLS R LE  On Airex EO H10 3 reps    [place] ES with QS/SLR Static Standing     See BFR protocol below       Cone taps in standing Fatigued at end   Standing Plank hip ext/multifidus Over Wedge 7/1; HEP   Supine B quad/hip ad isos        Therapeutic Activity (46147)      FSU/LSU 6\"  With CL march 6/29: needed tactile/manual cues to avoid L femoral /medial rotation   LSD  2\" step 2x10  V. C to avoid hyperextension   7/8: Time spent reassessing strength, ROM, and progress towards goals. Discussed remaining part of PT POC. Progress note written.  X10'                 MANUAL      Ankle mobs TC and STJ 6/29:   Calcaneal PROM IN?EV     Manual achilles stretches    MET to correct:     Streches at navicular and STJ into ,functional pronation    Patellar mobs all directions Grade 3 2'    IASTM R  Hamstring muscle belly  X8'       Li BFR Smart Phase Protocol               HEP instruction: (see scanned forms)  Access Code: [email or web address]  URL: [email or web address]  Date: 05/11/2022  Prepared by: Krystina Emerson     Exercises  Long Sitting Ankle Pumps - 5-7 x daily - 7 x weekly - 3 sets - 10 reps - 1-2 hold  Long Sitting Quad Set - 5-7 x daily - 7 x weekly - 1 sets - 10 reps - 10 hold  Long Sitting Calf Stretch with Strap - 2 x daily - 7 x weekly - 1 sets - 5 reps - 30\" hold  Seated Table Hamstring Stretch - 2 x daily - 7 x weekly - 1 sets - 5 reps - 30\" hold  Supine Straight Leg Raises - 2 x daily - 7 x weekly - 2 sets - 10 reps  Sitting Heel Slide with Towel - 2 x daily - 7 x weekly - 1 sets - 10 reps - 10\" hold    Access Code: [email or web address]  URL: [email or web address]  Date: 05/23/2022  Prepared by: Krystina Hernandez    Exercises ; WB exercises with brace on. Standing Hip Abduction with Counter Support - 1 x daily - 7 x weekly - 2-3 sets - 10 reps - 2 hold  Heel Toe Raises with Counter Support - 1 x daily - 7 x weekly - 2-3 sets - 10 reps  Prone Hip Extension - 2 x daily - 7 x weekly - 10 reps - 2-3 sets - 5 hold  Seated Long Arc Quad - 2 x daily - 7 x weekly - 10 reps - 3 sets - 1 second hold  Side Leg Lifts - 2 x daily - 7 x weekly - 10 reps - 2 sets - 5\" hold  Access Code: [email or web address]  URL: [email or web address]/  Date: 07/15/2022  Prepared by: Krystina David    Exercises  Standing Eccentric Heel Raise - 1 x daily - 7 x weekly - 3 sets - 10 reps - 2 hold  Standing Single Leg Heel Raise - 1 x daily - 7 x weekly - 3 sets - 10 reps  Standard Plank - 1 x daily - 7 x weekly - 3 sets - 3 reps - 30 hold  Side Plank with Clam and Resistance - 1 x daily - 7 x weekly - 2 sets - 15 reps - 5 hold  Forward T - 1 x daily - 7 x weekly - 3 sets - 10 reps - 5 hold  Side Stepping with Resistance at Feet - 2 x daily - 7 x weekly - 10 reps - 3 sets - 2 hold  [place] - 1-2 x daily - 7 x weekly - 3 sets - 10 reps - 2 hold  Backward Monster Walks - 1-2 x daily - 7 x weekly - 3 sets - 10 reps - 2 hold  Single Leg Bridge with Leg Supported - 1-2 x daily - 7 x weekly - 3 sets - 10 reps - 5-10 hold     Therapeutic Exercise and NMR EXR  [x] (34394) Provided verbal/tactile cueing for activities related to strengthening, flexibility, endurance, ROM for improvements in LE, proximal hip, and core control with self care, mobility, lifting, ambulation.  [] (76720) Provided verbal/tactile cueing for activities related to improving balance, coordination, kinesthetic sense, posture, motor skill, proprioception  to assist with LE, proximal hip, and core control in self care, mobility, lifting, ambulation and eccentric single leg control.      NMR and Therapeutic Activities:    [x] (40647 or 19828) Provided verbal/tactile cueing for activities related to improving balance, coordination, kinesthetic sense, posture, motor skill, proprioception and motor activation to allow for proper function of core, proximal hip and LE with self care and ADLs  [] (75482) Gait Re-education- Provided training and instruction to the patient for proper LE, core and proximal hip recruitment and positioning and eccentric body weight control with ambulation re-education including up and down stairs     Home Exercise Program:    [x] (78910) Reviewed/Progressed HEP activities related to strengthening, flexibility, endurance, ROM of core, proximal hip and LE for functional self-care, mobility, lifting and ambulation/stair navigation   [] (95253)Reviewed/Progressed HEP activities related to improving balance, coordination, kinesthetic sense, posture, motor skill, proprioception of core, proximal hip and LE for self care, mobility, lifting, and ambulation/stair navigation      Manual Treatments:  PROM / STM / Oscillations-Mobs:  G-I, II, III, IV (PA's, Inf., Post.)  [x] (68852) Provided manual therapy to mobilize LE, proximal hip and/or LS spine soft tissue/joints for the purpose of modulating pain, promoting relaxation,  increasing ROM, reducing/eliminating soft tissue swelling/inflammation/restriction, improving soft tissue extensibility and allowing for proper ROM for normal function with self care, mobility, lifting and ambulation. Modalities: To ice at home.   [] GAME READY (VASO)- for significant edema, swelling, pain control. Charges  Timed Code Treatment Minutes: 55   Total Treatment Minutes: 54'         [] EVAL (LOW) 455 1011   [] EVAL (MOD) 93092  [] EVAL (HIGH) 38821   [] RE-EVAL     [x] RH(20692) x2   [] IONTO  [x] NMR (30006) x  2 [] VASO  [] Manual (05618)      [] Other: BFR  [] TA x     [] Mech Traction (26519)  [] ES(attended) (49530)      [] ES (un) (30046):         GOALS:  Patient stated goal: return to walking  [] Progressing: [x] Met: [] Not Met: [] Adjusted     Therapist goals for Patient:   Short Term Goals: To be achieved in: 2 weeks  1. Independent in HEP and progression per patient tolerance, in order to prevent re-injury. [] Progressing: [x] Met: [] Not Met: [] Adjusted  2. Patient will have a decrease in pain to facilitate improvement in movement, function, and ADLs as indicated by Functional Deficits. [] Progressing: [x] Met: [] Not Met: [] Adjusted     Long Term Goals: To be achieved in: 12 weeks  1. Disability index score of 69% or more per FOTO to assist with reaching prior level of function. ; 67.28 grossly met  [] Progressing: [x] Met: [] Not Met: [] Adjusted  2. Patient will demonstrate increased AROM to 0-130 R knee or better to allow for proper joint functioning as indicated by patients Functional Deficits. [] Progressing: [x] Met: [] Not Met: [] Adjusted  3. Patient will demonstrate an increase in Strength to good proximal hip strength and control, within 5lb HHD in LE to allow for proper functional mobility as indicated by patients Functional Deficits.  [x] Progressing: [] Met: [] Not Met: [] Adjusted  4. Patient will return to walking with assistive device with normalize gait pattern functional activities without increased symptoms or restriction. Mild lumbppelvic rotation  [x] Progressing: [] Met: [] Not Met: [] Adjusted  5. Reciprocal gait with stairs (patient specific functional goal)    [] Progressing: [] Met: [x] Not Met: [] Adjusted       Progression Towards Functional goals:  [x] Patient is progressing as expected towards functional goals listed. [] Progression is slowed due to complexities listed. [] Progression has been slowed due to co-morbidities. [] Plan just implemented, too soon to assess goals progression  [] Other:         Overall Progression Towards Functional goals/ Treatment Progress Update:  [x] Patient is progressing as expected towards functional goals listed. [] Progression is slowed due to complexities/Impairments listed. [] Progression has been slowed due to co-morbidities. [] Plan just implemented, too soon to assess goals progression <30days   [] Goals require adjustment due to lack of progress  [] Patient is not progressing as expected and requires additional follow up with physician  [] Other    Prognosis for POC: [x] Good [] Fair  [] Poor      Patient requires continued skilled intervention: [x] Yes  [] No    Treatment/Activity Tolerance:  [x] Patient able to complete treatment  [] Patient limited by fatigue  [] Patient limited by pain    [] Patient limited by other medical complications  [] Other:     ASSESSMENT: Patient presents with R knee pain, limited ROM, strength deficits, gait impairments as expected post-surgical procedure. Noted improvements in  R LE quad/Hip Abductor strength, but some VMO and Hip Adductor weakness presented. Patient has met her STGS, 1 LTG, and is progressing towards remaining LTGs       Patient progressing well with ROM/strength. Easily fatigued and challenged with new exercises. Assess response. Reports minimal to no pain. .    Pt requires skilled intervention to restore ROM, strength, functional endurance and balance in order to perform ADLs without significant symptoms or limitations. Return to Play: (if applicable)   []  Stage 1: Intro to Strength   []  Stage 2: Return to Run and Strength   []  Stage 3: Return to Jump and Strength   []  Stage 4: Dynamic Strength and Agility   []  Stage 5: Sport Specific Training     []  Ready to Return to Play, Meets All Above Stages   []  Not Ready for Return to Sports   Comments:                               PLAN: See eval. Progress per protocol for meniscus repair. Progress CKC as tolerated per protocol. Assess response to new exercises. [x] Continue per plan of care [] Alter current plan (see comments above)  [] Plan of care initiated [] Hold pending MD visit [] Discharge      Electronically signed by:  [place] [id number]      Note: If patient does not return for scheduled/ recommended follow up visits, this note will serve as a discharge from care along with most recent update on progress.

## 2022-07-20 ENCOUNTER — HOSPITAL ENCOUNTER (OUTPATIENT)
Dept: PHYSICAL THERAPY | Age: 37
Setting detail: THERAPIES SERIES
Discharge: HOME | End: 2022-07-20
Payer: COMMERCIAL

## 2022-07-20 PROCEDURE — 97112 NEUROMUSCULAR REEDUCATION: CPT

## 2022-07-20 PROCEDURE — 97110 THERAPEUTIC EXERCISES: CPT

## 2022-07-20 NOTE — FLOWSHEET NOTE
[place], [facility], [address]  Phone: [phone]   Fax: [phone]    Physical Therapy Treatment Note/ Progress Report:           Date:  2022    Patient Name:  Danny Hay    :  1985  MRN: [id number]  Restrictions/Precautions:  50% WB Brace locked in full extension for WB 0-2 weeks, 0-90 ROM in NWB  Medical/Treatment Diagnosis Information:  Diagnosis: S83.211A (ICD-10-CM) - Bucket-handle tear of medial meniscus of right knee as current injury, initial encounter  Treatment Diagnosis: 83.211D (ICD-10-CM) - Bucket-handle tear of medial meniscus of right knee as current injury, R knee pain M25.561, R knee effusion M25.461 s/p MM Repair on 22.   Insurance/Certification information:  PT Insurance Information: [address] 30 visit / Chivo Savage after 30       Physician Information:   [facility]   Has the plan of care been signed (Y/N):        []  Yes  [x]  No     Date of Patient follow up with Physician: 22      Is this a Progress Report:     []  Yes  [x]  No        If Yes:  Date Range for reporting period:  Beginnin22  Ending:     Progress report will be due (10 Rx or 30 days whichever is less):Visit # 21 or       Recertification will be due (POC Duration  / 90 days whichever is less): 12 weeks/      Visit # Insurance Allowable Auth Required   In-person 14 30 visit / Curly Pyo after 30      []  Yes []  No    Telehealth   []  Yes []  No    Total          Functional Scale: FOTO: 43/100    Date assessed:  22    Functional Scale FOTO : 67.2758    Date assessed:  22   Therapy Diagnosis/Practice Pattern:4     Number of Comorbidities:  []0     [x]1-2    []3    Latex Allergy:  [x]NO      []YES  Preferred Language for Healthcare:   [x]English       []other:    9 weeks   post-op 22    Pain level:  0/10     SUBJECTIVE:  Has started using bike at home for exercise, improved ease with stairs. Still modifying workout routine due to weakness. Unable to return to full previous work duty due to inability to squat fully to floor to lift and perform work duty. Still not back to running yet. OBJECTIVE:   Progressed CKC exercises this visit and HEP. Provided updated HEP. Held BFR this visit due to patient's transportation arrived early. Observation:   Test measurements:   Upgraded to stationary bike/leg press; noted decreased medial femoral rotation with walking this dtae    OBJECTIVE  Test used 5/11/22 6/8 7/8/22     Pain Summary  2-5/10 0/10 0/10     Functional questionnaire FOTO 43% 59% 59%     Functional Testing MP girth L/R 36/36.8cm 35.8/34.5 35.8/34.5      Quad girth 15 cm above SP R/L NT 46.8cm/49.8 cm NT      Knee flex  85-90 R135 this visit/L 159 R146 this visit/L 159     ROM Knee ext R/L -5/+10 +10/+10 +1/+7      Hip flex R/L  4-/4+ /4 + B     Strength Quad tone 3+/5 4-/5 4+/5; R VMO Fair+      Hip abd NT 4, 4+ 4+, 4+      SLR R/L IND 4-, 4 4/, 4    RESTRICTIONS/PRECAUTIONS:  2-6 weeks post-op 0-90 with brace. D/C crutches after 4 weeks. Brace d/c after 6 weeks. ROM WFL as tolerated in NWB  No WB with flexion >90. ( 8 weeks). Leg press 0-90  And bike at 8 week post-op. Previous ACL/ MMR  May 2021 from Dr. Jessi Mckeon. See scanned in protocol in media. Exercises/Interventions:        BFR Session   30 seconds rest period between each set of repetitions.   2 Min rest/reperfusion between    each exercises protocol perfromed    BFR Locations R thigh BFR set at: 170 mmHg 80%   Protocol: 30/15/15/15 Exercises:   Reps 30 second   SLR flexion  Seated HOB elevated  1#  # of reps required  30 Rest  completed  30 SLR AB  1# reps required  15  completed  15 SLR prone hip extension 1# reps required  15  completed 15 Step-ups FWD 6\" Reps required 15  completed  15 Sidelying hip adduction 1# reps required  15      completed  15              Therapeutic Ex (02468) Sets/sec Reps Notes/CUES   Standing HR eccentrics/unilateral  H5 2x10 reps  New 7/13 HEP   Standing TKE green    Prone TKE H5    TG wall squats 0-80 range  With ball squeeze SB wall squats 0-60 range R LE with L heel on floor    Standing B Gastroc stretches H 20\" 2 B 6/1   Lifecycle stationary Bike Level 1 6' 7/1   Standing R Soleus Stretches 628   RDL mini with cone pick-up using chair  2x10 Easily fatigued   Prone HS curls Resume next session R SLR Hip Add  20# 3x10 reps    Knee ext 90-30  30# 3x10    Leg Press B Squats 0-90 only/eccentrics 50# R LE 70# 70# 3 X15/ 55# 2x10                NMR re-education (99041)   CUES NEEDED   SB bridges    SLS R LE  On Airex EO H10 3 reps    [place] ES with QS/SLR                 Therapeutic Activity (64963)      FSU/LSU 6\"  With CL march 6/29: needed tactile/manual cues to avoid L femoral /medial rotation   2\" step 2x10  V. C to avoid hyperextension                   MANUAL      Ankle mobs TC and STJ 6/29:   Calcaneal PROM IN?EV     Manual achilles stretches    MET to correct:     Streches at navicular and STJ into ,functional pronation    Patellar mobs all directions Grade 3 2'    IASTM R  Hamstring muscle belly  X8'       Li BFR Smart Phase Protocol               HEP instruction: (see scanned forms)  Access Code: [email or web address]  URL: [email or web address]  Date: 05/11/2022  Prepared by: Bashir Nailser     Exercises  Long Sitting Ankle Pumps - 5-7 x daily - 7 x weekly - 3 sets - 10 reps - 1-2 hold  Long Sitting Quad Set - 5-7 x daily - 7 x weekly - 1 sets - 10 reps - 10 hold  Long Sitting Calf Stretch with Strap - 2 x daily - 7 x weekly - 1 sets - 5 reps - 30\" hold  Seated Table Hamstring Stretch - 2 x daily - 7 x weekly - 1 sets - 5 reps - 30\" hold  Supine Straight Leg Raises - 2 x daily - 7 x weekly - 2 sets - 10 reps  Sitting Heel Slide with Towel - 2 x daily - 7 x weekly - 1 sets - 10 reps - 10\" hold    Access Code: [email or web address]  URL: [email or web address]  Date: 05/23/2022  Prepared by: Valente Gil    Exercises ; WB exercises with brace on. Standing Hip Abduction with Counter Support - 1 x daily - 7 x weekly - 2-3 sets - 10 reps - 2 hold  Heel Toe Raises with Counter Support - 1 x daily - 7 x weekly - 2-3 sets - 10 reps  Prone Hip Extension - 2 x daily - 7 x weekly - 10 reps - 2-3 sets - 5 hold  Seated Long Arc Quad - 2 x daily - 7 x weekly - 10 reps - 3 sets - 1 second hold  Side Leg Lifts - 2 x daily - 7 x weekly - 10 reps - 2 sets - 5\" hold  Access Code: [email or web address]  URL: [email or web address]  Date: 07/15/2022  Prepared by: Valente Jeffery    Exercises  Standing Eccentric Heel Raise - 1 x daily - 7 x weekly - 3 sets - 10 reps - 2 hold  Standing Single Leg Heel Raise - 1 x daily - 7 x weekly - 3 sets - 10 reps  Standard Plank - 1 x daily - 7 x weekly - 3 sets - 3 reps - 30 hold  Side Plank with Clam and Resistance - 1 x daily - 7 x weekly - 2 sets - 15 reps - 5 hold  Forward T - 1 x daily - 7 x weekly - 3 sets - 10 reps - 5 hold  Side Stepping with Resistance at Feet - 2 x daily - 7 x weekly - 10 reps - 3 sets - 2 hold  Forward Monster Walks - 1-2 x daily - 7 x weekly - 3 sets - 10 reps - 2 hold  Backward Monster Walks - 1-2 x daily - 7 x weekly - 3 sets - 10 reps - 2 hold  Single Leg Bridge with Leg Supported - 1-2 x daily - 7 x weekly - 3 sets - 10 reps - 5-10 hold     Therapeutic Exercise and NMR EXR  [x] (10457) Provided verbal/tactile cueing for activities related to strengthening, flexibility, endurance, ROM for improvements in LE, proximal hip, and core control with self care, mobility, lifting, ambulation.  [] (31643) Provided verbal/tactile cueing for activities related to improving balance, coordination, kinesthetic sense, posture, motor skill, proprioception  to assist with LE, proximal hip, and core control in self care, mobility, lifting, ambulation and eccentric single leg control.      NMR and Therapeutic Activities:    [x] (28052 or 83677) Provided verbal/tactile cueing for activities related to improving balance, coordination, kinesthetic sense, posture, motor skill, proprioception and motor activation to allow for proper function of core, proximal hip and LE with self care and ADLs  [] (21981) Gait Re-education- Provided training and instruction to the patient for proper LE, core and proximal hip recruitment and positioning and eccentric body weight control with ambulation re-education including up and down stairs     Home Exercise Program:    [x] (96170) Reviewed/Progressed HEP activities related to strengthening, flexibility, endurance, ROM of core, proximal hip and LE for functional self-care, mobility, lifting and ambulation/stair navigation   [] (39325)Reviewed/Progressed HEP activities related to improving balance, coordination, kinesthetic sense, posture, motor skill, proprioception of core, proximal hip and LE for self care, mobility, lifting, and ambulation/stair navigation      Manual Treatments:  PROM / STM / Oscillations-Mobs:  G-I, II, III, IV (PA's, Inf., Post.)  [x] (84571) Provided manual therapy to mobilize LE, proximal hip and/or LS spine soft tissue/joints for the purpose of modulating pain, promoting relaxation,  increasing ROM, reducing/eliminating soft tissue swelling/inflammation/restriction, improving soft tissue extensibility and allowing for proper ROM for normal function with self care, mobility, lifting and ambulation. Modalities: To ice at home.   [] GAME READY (VASO)- for significant edema, swelling, pain control.      Charges  Timed Code Treatment Minutes: 45   Total Treatment Minutes: 45         [] EVAL (LOW) 65926   [] EVAL (MOD) 18548  [] EVAL (HIGH) 71427   [] RE-EVAL     [x] UA(59122) x2    [] IONTO  [x] NMR (70398) x 1    [] VASO  [] Manual (50889)       [] Other: BFR  [] TA x      [] Mech Traction (05542)  [] ES(attended) (09036)      [] ES (un) (13461):         GOALS:  Patient stated goal: return to walking  [] Progressing: [x] Met: [] Not Met: [] Adjusted     Therapist goals for Patient:   Short Term Goals: To be achieved in: 2 weeks  1. Independent in HEP and progression per patient tolerance, in order to prevent re-injury. [] Progressing: [x] Met: [] Not Met: [] Adjusted  2. Patient will have a decrease in pain to facilitate improvement in movement, function, and ADLs as indicated by Functional Deficits. [] Progressing: [x] Met: [] Not Met: [] Adjusted     Long Term Goals: To be achieved in: 12 weeks  1. Disability index score of 69% or more per FOTO to assist with reaching prior level of function. ; 67.28 grossly met  [] Progressing: [x] Met: [] Not Met: [] Adjusted  2. Patient will demonstrate increased AROM to 0-130 R knee or better to allow for proper joint functioning as indicated by patients Functional Deficits. [] Progressing: [x] Met: [] Not Met: [] Adjusted  3. Patient will demonstrate an increase in Strength to good proximal hip strength and control, within 5lb HHD in LE to allow for proper functional mobility as indicated by patients Functional Deficits. [x] Progressing: [] Met: [] Not Met: [] Adjusted  4. Patient will return to walking with assistive device with normalize gait pattern functional activities without increased symptoms or restriction. Mild lumbppelvic rotation  [x] Progressing: [] Met: [] Not Met: [] Adjusted  5. Reciprocal gait with stairs (patient specific functional goal)    [] Progressing: [] Met: [x] Not Met: [] Adjusted       Progression Towards Functional goals:  [x] Patient is progressing as expected towards functional goals listed. [] Progression is slowed due to complexities listed. [] Progression has been slowed due to co-morbidities. [] Plan just implemented, too soon to assess goals progression  [] Other:         Overall Progression Towards Functional goals/ Treatment Progress Update:  [x] Patient is progressing as expected towards functional goals listed.     [] Progression is slowed due to complexities/Impairments listed. [] Progression has been slowed due to co-morbidities. [] Plan just implemented, too soon to assess goals progression <30days   [] Goals require adjustment due to lack of progress  [] Patient is not progressing as expected and requires additional follow up with physician  [] Other    Prognosis for POC: [x] Good [] Fair  [] Poor      Patient requires continued skilled intervention: [x] Yes  [] No    Treatment/Activity Tolerance:  [x] Patient able to complete treatment  [] Patient limited by fatigue  [] Patient limited by pain    [] Patient limited by other medical complications  [] Other:     ASSESSMENT: Pt continues to require cueing for proper speed and control throughout routine as well as maintaining proper pelvic alignment with dead lifts to minimize excessive trunk rotation. Pt challenged by routine this date with continued muscle weakness noted by end of routine. Continue with routine as tolerated with upgrades per protocol for continued strengthening and endurance as well as proprioception to decrease further knee injury and improve tolerance to return to functional work and recreational activity as tolerated. Return to Play: (if applicable)   []  Stage 1: Intro to Strength   []  Stage 2: Return to Run and Strength   []  Stage 3: Return to Jump and Strength   []  Stage 4: Dynamic Strength and Agility   []  Stage 5: Sport Specific Training   []  Ready to Return to Play, Meets All Above Stages   []  Not Ready for Return to Sports   Comments:                               PLAN: See eval. Progress per protocol for meniscus repair. Progress CKC as tolerated per protocol. Assess response to new exercises.   [x] Continue per plan of care [] Alter current plan (see comments above)  [] Plan of care initiated [] Hold pending MD visit [] Discharge      Electronically signed by:  Nelly Alonso, PT    Session assisted by Berenice Bales PTA [id number]    Note: If patient does not return for scheduled/ recommended follow up visits, this note will serve as a discharge from care along with most recent update on progress.

## 2022-07-22 ENCOUNTER — HOSPITAL ENCOUNTER (OUTPATIENT)
Dept: PHYSICAL THERAPY | Age: 37
Setting detail: THERAPIES SERIES
End: 2022-07-22
Payer: COMMERCIAL

## 2022-07-25 ENCOUNTER — OFFICE VISIT (OUTPATIENT)
Dept: ORTHOPEDIC SURGERY | Age: 37
End: 2022-07-25
Payer: COMMERCIAL

## 2022-07-25 VITALS — HEIGHT: 65 IN | BODY MASS INDEX: 23.66 KG/M2 | WEIGHT: 142 LBS

## 2022-07-25 DIAGNOSIS — M77.12 LEFT LATERAL EPICONDYLITIS: Primary | ICD-10-CM

## 2022-07-25 PROCEDURE — G8420 CALC BMI NORM PARAMETERS: HCPCS | Performed by: ORTHOPAEDIC SURGERY

## 2022-07-25 PROCEDURE — 99214 OFFICE O/P EST MOD 30 MIN: CPT | Performed by: ORTHOPAEDIC SURGERY

## 2022-07-25 PROCEDURE — 4004F PT TOBACCO SCREEN RCVD TLK: CPT | Performed by: ORTHOPAEDIC SURGERY

## 2022-07-25 PROCEDURE — G8427 DOCREV CUR MEDS BY ELIG CLIN: HCPCS | Performed by: ORTHOPAEDIC SURGERY

## 2022-07-25 NOTE — PROGRESS NOTES
ORTHOPAEDIC NEW PATIENT NOTE    Chief Complaint   Patient presents with    New Patient     NP Lt Elbow       HPI  7/25/22  40 y.o. 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 Pain Assessment  Location of Pain: Elbow  Location Modifiers: Left  Severity of Pain: 4  Quality of Pain: Grinding, Popping, Cracking  Duration of Pain: A few minutes  Frequency of Pain: Intermittent  Aggravating Factors: Straightening, Exercise  Limiting Behavior: Some  Relieving Factors: Ice  Result of Injury: No  Work-Related Injury: No  Are there other pain locations you wish to document?: No    Review of Systems  I have read over the ROS from the Patient History Form dated on 4/22/22  Pertinent positives include weight gain, thyroid disease  Smokes 1ppd  Rest of 13 point ROS otherwise negative except per HPI, and scanned into the patient's chart under the Media tab. Allergies   Allergen Reactions    Keflex [Cephalexin] Rash        Current Outpatient Medications   Medication Sig Dispense Refill    pantoprazole (PROTONIX) 40 MG tablet Take 40 mg by mouth daily      ARIPiprazole (ABILIFY) 5 MG tablet Take 1 tablet by mouth daily 30 tablet 3    escitalopram (LEXAPRO) 20 MG tablet Take 1 tablet by mouth daily 30 tablet 2    aspirin 325 MG EC tablet Take 1 tablet by mouth daily for 14 days 14 tablet 0    SUMAtriptan (IMITREX) 25 MG tablet Take 1 tablet by mouth once as needed for Migraine May repeat in 2 hours if not improving- max daily 200mg. 20 tablet 2     No current facility-administered medications for this visit.        Past Medical History:   Diagnosis Date    Migraine     Thyroid disease     Ureteral reflux Past Surgical History:   Procedure Laterality Date    ANTERIOR CRUCIATE LIGAMENT REPAIR Right     BLADDER SURGERY      for reflux    KNEE ARTHROSCOPY Right 2022    RIGHT KNEE EXAM UNDER ANESTHESIA WITH ARTHROSCOPIC MEDIAL MENISCUS REPAIR performed by Katie Patterson MD at [address] [place] [address] Right     [address]         Family History   Problem Relation Age of Onset    Heart Disease Mother     No Known Problems Father     No Known Problems Sister     No Known Problems Brother     No Known Problems Maternal Grandmother     No Known Problems Maternal Grandfather     No Known Problems Paternal Grandmother     No Known Problems Paternal Grandfather     No Known Problems Daughter     Heart Defect Daughter     Heart Defect Daughter        Social History     Socioeconomic History    Marital status: Single     Spouse name: Not on file    Number of children: Not on file    Years of education: Not on file    Highest education level: Not on file   Occupational History    Not on file   Tobacco Use    Smoking status: Former     Packs/day: 0.50     Types: Cigarettes     Quit date: 2019     Years since quittin.8    Smokeless tobacco: Current    Tobacco comments:     Vaps   Vaping Use    Vaping Use: Every day    Substances: Nicotine   Substance and Sexual Activity    Alcohol use: Yes     Comment: social    Drug use: Yes     Frequency: 2.0 times per week     Types: Marijuana (Weed)     Comment: smoked last night    Sexual activity: Yes     Partners: Male   Other Topics Concern    Not on file   Social History Narrative    Not on file     Social Determinants of Health     Financial Resource Strain: Low Risk     Difficulty of Paying Living Expenses: Not hard at all   Food Insecurity: No Food Insecurity    Worried About Running Out of Food in the Last Year: Never true    Ran Out of Food in the Last Year: Never true   Transportation Needs: Not on file   Physical Activity: Not on file   Stress: Not on file   Social Connections: Not on file   Intimate Partner Violence: Not on file   Housing Stability: Not on file        Vitals:    07/25/22 0852   Weight: 142 lb (64.4 kg)   Height: 5' 5\" (1.651 m)       Physical Exam  Constitutional - well-groomed, well-nourished, Body mass index is 23.63 kg/m². Psychiatric - normal mood & affect, pleasant  Cardiovascular - RRR, negative UE peripheral edema, radial pulse 2+  Respiratory - respirations unlabored, on room air; mask on  Skin - no rashes, wounds, or lesions seen on exposed skin. Extensive tattoos LUE. Neurological - LUE SILT M/U/R/A/LABC nerve distributions; AIN/PIN/IO intact  Left elbow - no obvious deformity/swelling/ecchymosis  Extension/flexion 0/155  Supination/pronation 90/80  Focally tender to palpation lateral epicondyle and common extensor origin  Pain reproduction with resisted wrist extension and supination  Negative hook test      Imaging:  Images were personally reviewed by myself and discussed with the patient  Left elbow 3 views performed 7/25/22 -no acute osseous abnormalities. No fractures or dislocation, joint alignment is maintained. Preserved articular height, no significant degenerative changes are seen. Small effusion seen on the lateral view. Assessment & Plan:  40 y.o. female who presents with    Diagnosis Orders   1. Left lateral epicondylitis  XR ELBOW LEFT (MIN 3 VIEWS)          No orders of the defined types were placed in this encounter. Discussed diagnosis and relevant anatomy  Informed the patient vast majority of people respond favorably to conservative treatment  Activity modification, avoiding repetitive aggravating activities, ice, OTC NSAIDs/tylenol, tennis elbow strap, +/- steroid injections, and time  If symptoms persist, possibility of surgical debridement and repair, but only a minority of patients will end up requiring surgery. Showed her elbow strap today, she reports she will purchase one online.     Sallie Kilgore Sinan Torre MD

## 2022-07-26 ENCOUNTER — TELEPHONE (OUTPATIENT)
Dept: ORTHOPEDIC SURGERY | Age: 37
End: 2022-07-26

## 2022-07-26 NOTE — TELEPHONE ENCOUNTER
Left VM letting Pt know that we are not in FF tomorrow but we will be back Thursday morning.  I asked her to please call the scheduling line to reschedule

## 2022-07-26 NOTE — TELEPHONE ENCOUNTER
General Question     Subject: TODAY'S POST OP APPOINTMENT CANCELLATION  Patient and /or Facility Request: Jose Stovall  Contact Number: [phone]    PATIENT CALLED SAYING SHE WILL BE UNABLE TO MAKE HER POST OP APPOINTMENT TODAY DUE TO TRANSPORTATION ISSUES. PATIENT WANTS TO KNOW IF SHE SHE CAN BE SEEN TOMORROW, THE SAME DAY AS HER PHYSICAL THERAPY APPOINTMENT. PLEASE CONTACT PATIENT AT THE ABOVE NUMBER.

## 2022-07-27 ENCOUNTER — HOSPITAL ENCOUNTER (OUTPATIENT)
Dept: PHYSICAL THERAPY | Age: 37
Setting detail: THERAPIES SERIES
Discharge: HOME | End: 2022-07-27
Payer: COMMERCIAL

## 2022-07-27 NOTE — FLOWSHEET NOTE
Physical Therapy  Cancellation/No-show Note  Patient Name:  Candice Marlow  :  1985   Date:  2022  Cancelled visits to date: 3  6/3, ,   No-shows to date: 2  6/15 7/27    Patient status for today's appointment patient:  []  Cancelled  []  Rescheduled appointment  [x]  No-show     Reason given by patient:  []  Patient ill  []  Conflicting appointment  []  No transportation    []  Conflict with work  []  No reason given  []  Other:     Comments:    Phone call information:   [x]  Phone call made today to patient at [phone] number provided:      []  Patient answered, conversation as follows:    [x]  Patient did not answer, message left as follows:left message regarding missed appointment today and next PT session this Friday. Asked to call if unable to attend  []  Phone call not made today  []  Phone call not needed - pt contacted us to cancel and provided reason for cancellation.      Electronically signed by:  Karyle Flavin, [address] [facility]

## 2022-07-29 ENCOUNTER — HOSPITAL ENCOUNTER (OUTPATIENT)
Dept: PHYSICAL THERAPY | Age: 37
Setting detail: THERAPIES SERIES
Discharge: HOME | End: 2022-07-29
Payer: COMMERCIAL

## 2022-07-29 PROCEDURE — 97110 THERAPEUTIC EXERCISES: CPT | Performed by: PHYSICAL THERAPIST

## 2022-07-29 PROCEDURE — 97112 NEUROMUSCULAR REEDUCATION: CPT | Performed by: PHYSICAL THERAPIST

## 2022-07-29 NOTE — FLOWSHEET NOTE
[place], [address] [facility], [address]  Phone: [phone]   Fax: [phone]    Physical Therapy Treatment Note/ Progress Report:           Date:  2022    Patient Name:  Irma Crowe    :  1985  MRN: [id number]  Restrictions/Precautions:  50% WB Brace locked in full extension for WB 0-2 weeks, 0-90 ROM in NWB  Medical/Treatment Diagnosis Information:  Diagnosis: J20.175K (ICD-10-CM) - Bucket-handle tear of medial meniscus of right knee as current injury, initial encounter  Treatment Diagnosis: 83.211D (ICD-10-CM) - Bucket-handle tear of medial meniscus of right knee as current injury, R knee pain M25.561, R knee effusion M25.461 s/p MM Repair on 22.   Insurance/Certification information:  PT Insurance Information: [address] 30 visit / Tristan Garrett after 30       Physician Information:   Justin He   Has the plan of care been signed (Y/N):        []  Yes  [x]  No     Date of Patient follow up with Physician: 22      Is this a Progress Report: Next week     []  Yes  [x]  No        If Yes:  Date Range for reporting period:  Beginnin22  Ending:     Progress report will be due (10 Rx or 30 days whichever is less):Visit # 21 or       Recertification will be due (POC Duration  / 90 days whichever is less): 12 weeks/      Visit # Insurance Allowable Auth Required   In-person 15 30 visit / Tristan Garrett after 30      []  Yes []  No    Telehealth   []  Yes []  No    Total          Functional Scale: FOTO: 43/100    Date assessed:  22    Functional Scale FOTO : 67.2758    Date assessed:  22   Therapy Diagnosis/Practice Pattern:4     Number of Comorbidities:  []0     [x]1-2    []3    Latex Allergy:  [x]NO      []YES  Preferred Language for Healthcare:   [x]English       []other:    12weeks   post-op 22    Pain level:  0/10     SUBJECTIVE: TKE green    Prone TKE H5    TG wall squats 0-80 range  With ball squeeze SB wall squats 0-60 range R LE with L heel on floor    Standing B Gastroc stretches H 20\" 2 B 6/1   Lifecycle stationary Bike Level 1 6' 7/1   Standing R Soleus Stretches 628   RDL mini with cone pick-up using chair  2x10 Easily fatigued   Prone HS curls Resume next session R SLR Hip Add  20# 3x10 reps    Knee ext 90-30     Leg Press B Squats 0-90 only/eccentrics 50# R LE                NMR re-education (05049)   CUES NEEDED   SB bridges with HS curl H5 2x10 reps    SLS R LE  On Airex EO    Stool walking 1 lap Sit-stand with eccentric lowering on R LE  2x10 reps SL sit-stand with heel on floor  1x10 reps V.C for avoidance of valgus   Y balance with gliders  X10 reps each    Blaze pods Around the world  3 reps Prone hypers off EOB H5 2x10 reps [place] ES with QS/SLR                 Therapeutic Activity (63447)      FSU/LSU 6\"  With CL march 6/29: needed tactile/manual cues to avoid L femoral /medial rotation   LSD4\" step 2x10                     MANUAL      Ankle mobs TC and STJ 6/29:   Calcaneal PROM IN?EV     Manual achilles stretches    MET to correct:     Streches at navicular and STJ into ,functional pronation    Patellar mobs all directions Grade 3 2'    IASTM R  Hamstring muscle belly  X8'       Li BFR Smart Phase Protocol               HEP instruction: (see scanned forms)  Access Code: [email or web address]  URL: [email or web address]  Date: 05/11/2022  Prepared by: Cory Last     Exercises  Long Sitting Ankle Pumps - 5-7 x daily - 7 x weekly - 3 sets - 10 reps - 1-2 hold  Long Sitting Quad Set - 5-7 x daily - 7 x weekly - 1 sets - 10 reps - 10 hold  Long Sitting Calf Stretch with Strap - 2 x daily - 7 x weekly - 1 sets - 5 reps - 30\" hold  Seated Table Hamstring Stretch - 2 x daily - 7 x weekly - 1 sets - 5 reps - 30\" hold  Supine Straight Leg Raises - 2 x daily - 7 x weekly - 2 sets - 10 reps  Sitting Heel Slide with Towel - 2 x daily - 7 x weekly - 1 sets - 10 reps - 10\" hold    Access Code: [email or web address]  URL: [email or web address]  Date: 05/23/2022  Prepared by: Gretta Warren    Exercises ; WB exercises with brace on. Standing Hip Abduction with Counter Support - 1 x daily - 7 x weekly - 2-3 sets - 10 reps - 2 hold  Heel Toe Raises with Counter Support - 1 x daily - 7 x weekly - 2-3 sets - 10 reps  Prone Hip Extension - 2 x daily - 7 x weekly - 10 reps - 2-3 sets - 5 hold  Seated Long Arc Quad - 2 x daily - 7 x weekly - 10 reps - 3 sets - 1 second hold  Side Leg Lifts - 2 x daily - 7 x weekly - 10 reps - 2 sets - 5\" hold  Access Code: [email or web address]  URL: [employer]/  Date: 07/15/2022  Prepared by: Gretta Warren    Exercises  Standing Eccentric Heel Raise - 1 x daily - 7 x weekly - 3 sets - 10 reps - 2 hold  Standing Single Leg Heel Raise - 1 x daily - 7 x weekly - 3 sets - 10 reps  Standard Plank - 1 x daily - 7 x weekly - 3 sets - 3 reps - 30 hold  Side Plank with Clam and Resistance - 1 x daily - 7 x weekly - 2 sets - 15 reps - 5 hold  Forward T - 1 x daily - 7 x weekly - 3 sets - 10 reps - 5 hold  Side Stepping with Resistance at Feet - 2 x daily - 7 x weekly - 10 reps - 3 sets - 2 hold  Forward Monster Walks - 1-2 x daily - 7 x weekly - 3 sets - 10 reps - 2 hold  Backward Monster Walks - 1-2 x daily - 7 x weekly - 3 sets - 10 reps - 2 hold  Single Leg Bridge with Leg Supported - 1-2 x daily - 7 x weekly - 3 sets - 10 reps - 5-10 hold     Therapeutic Exercise and NMR EXR  [x] (70600) Provided verbal/tactile cueing for activities related to strengthening, flexibility, endurance, ROM for improvements in LE, proximal hip, and core control with self care, mobility, lifting, ambulation.  [] (45254) Provided verbal/tactile cueing for activities related to improving balance, coordination, kinesthetic sense, posture, motor skill, proprioception  to assist with LE, proximal hip, and core control in self care, mobility, lifting, ambulation and eccentric single leg control. NMR and Therapeutic Activities:    [x] (05686 or 50232) Provided verbal/tactile cueing for activities related to improving balance, coordination, kinesthetic sense, posture, motor skill, proprioception and motor activation to allow for proper function of core, proximal hip and LE with self care and ADLs  [] (78713) Gait Re-education- Provided training and instruction to the patient for proper LE, core and proximal hip recruitment and positioning and eccentric body weight control with ambulation re-education including up and down stairs     Home Exercise Program:    [x] (94769) Reviewed/Progressed HEP activities related to strengthening, flexibility, endurance, ROM of core, proximal hip and LE for functional self-care, mobility, lifting and ambulation/stair navigation   [] (55551)Reviewed/Progressed HEP activities related to improving balance, coordination, kinesthetic sense, posture, motor skill, proprioception of core, proximal hip and LE for self care, mobility, lifting, and ambulation/stair navigation      Manual Treatments:  PROM / STM / Oscillations-Mobs:  G-I, II, III, IV (PA's, Inf., Post.)  [x] (12950) Provided manual therapy to mobilize LE, proximal hip and/or LS spine soft tissue/joints for the purpose of modulating pain, promoting relaxation,  increasing ROM, reducing/eliminating soft tissue swelling/inflammation/restriction, improving soft tissue extensibility and allowing for proper ROM for normal function with self care, mobility, lifting and ambulation. Modalities: To ice at home.   [] GAME READY (VASO)- for significant edema, swelling, pain control.      Charges  Timed Code Treatment Minutes: 61'   Total Treatment Minutes: 61'         [] EVAL (LOW) 455 1011   [] EVAL (MOD) 18538  [] EVAL (HIGH) 59302   [] RE-EVAL     [x] HER(69123) x2    [] IONTO  [x] NMR (47613) x 2    [] VASO  [] Manual (58256)       [] Other: BFR  [] TA x      [] Mech Traction (36123)  [] ES(attended) (88016)      [] ES (un) (53692):         GOALS:  Patient stated goal: return to walking  [] Progressing: [x] Met: [] Not Met: [] Adjusted     Therapist goals for Patient:   Short Term Goals: To be achieved in: 2 weeks  1. Independent in HEP and progression per patient tolerance, in order to prevent re-injury. [] Progressing: [x] Met: [] Not Met: [] Adjusted  2. Patient will have a decrease in pain to facilitate improvement in movement, function, and ADLs as indicated by Functional Deficits. [] Progressing: [x] Met: [] Not Met: [] Adjusted     Long Term Goals: To be achieved in: 12 weeks  1. Disability index score of 69% or more per FOTO to assist with reaching prior level of function. ; 67.28 grossly met  [] Progressing: [x] Met: [] Not Met: [] Adjusted  2. Patient will demonstrate increased AROM to 0-130 R knee or better to allow for proper joint functioning as indicated by patients Functional Deficits. [] Progressing: [x] Met: [] Not Met: [] Adjusted  3. Patient will demonstrate an increase in Strength to good proximal hip strength and control, within 5lb HHD in LE to allow for proper functional mobility as indicated by patients Functional Deficits. [x] Progressing: [] Met: [] Not Met: [] Adjusted  4. Patient will return to walking with assistive device with normalize gait pattern functional activities without increased symptoms or restriction. Mild lumbppelvic rotation  [x] Progressing: [] Met: [] Not Met: [] Adjusted  5. Reciprocal gait with stairs (patient specific functional goal)    [] Progressing: [] Met: [x] Not Met: [] Adjusted       Progression Towards Functional goals:  [x] Patient is progressing as expected towards functional goals listed. [] Progression is slowed due to complexities listed. [] Progression has been slowed due to co-morbidities.   [] Plan just implemented, too soon to assess goals progression  [] Other:         Overall Progression Towards Functional goals/ Treatment would be a good candidate for GAP in the near future. [x] Continue per plan of care [] Alter current plan (see comments above)  [] Plan of care initiated [] Hold pending MD visit [] Discharge      Electronically signed by:  Ruby Gray, [address] [facility]      Note: If patient does not return for scheduled/ recommended follow up visits, this note will serve as a discharge from care along with most recent update on progress.

## 2022-08-03 ENCOUNTER — HOSPITAL ENCOUNTER (OUTPATIENT)
Dept: PHYSICAL THERAPY | Age: 37
Setting detail: THERAPIES SERIES
Discharge: HOME | End: 2022-08-03

## 2022-08-05 ENCOUNTER — HOSPITAL ENCOUNTER (OUTPATIENT)
Dept: PHYSICAL THERAPY | Age: 37
Setting detail: THERAPIES SERIES
Discharge: HOME | End: 2022-08-05

## 2022-08-05 NOTE — FLOWSHEET NOTE
Physical Therapy  Cancellation/No-show Note  Patient Name:  Danny Hay  :  1985   Date:  2022  Cancelled visits to date: 4  6/3, , , 8/3  No-shows to date: 2  6/15,     Patient status for today's appointment patient:  []  Cancelled  []  Rescheduled appointment  [x]  No-show     Reason given by patient:  []  Patient ill  []  Conflicting appointment  []  No transportation    []  Conflict with work  []  No reason given  []  Other:     Comments:   not sure reason of cancellation. Phone call information:   [x]  Phone call made today to patient at _ time at number provided:      []  Patient answered, conversation as follows:    [x]  Patient did not answer, message left as follows:Left message about missed appointment and reminded patient regarding her appointment next week. Patient was starting a new job this week which may be the reason she did not attend PT.   []  Phone call not made today  []  Phone call not needed - pt contacted us to cancel and provided reason for cancellation.      Electronically signed by:  Jade Junior

## 2022-08-10 ENCOUNTER — HOSPITAL ENCOUNTER (OUTPATIENT)
Dept: PHYSICAL THERAPY | Age: 37
Setting detail: THERAPIES SERIES
Discharge: HOME | End: 2022-08-10

## 2022-08-10 NOTE — FLOWSHEET NOTE
Physical Therapy  Cancellation/No-show Note  Patient Name:  Sandeep Lucia  :  1985   Date:  8/10/2022  Cancelled visits to date: 4  6/3, , , 8/3  No-shows to date: 2  6/15, , 8/10    Patient status for today's appointment patient:  []  Cancelled  []  Rescheduled appointment  [x]  No-show     Reason given by patient:  []  Patient ill  []  Conflicting appointment  []  No transportation    []  Conflict with work  []  No reason given  []  Other:     Comments:   not sure reason of cancellation. Phone call information:   [x]  Phone call made today to patient at _ time at number provided:      []  Patient answered, conversation as follows:    [x]  Patient did not answer, message left as follows:Left message about missed appointment and reminded patient regarding her final appointment this Friday. Patient was starting a new job this week which may be the reason she did not attend PT.   []  Phone call not made today  []  Phone call not needed - pt contacted us to cancel and provided reason for cancellation.      Electronically signed by:  Carlyn Rey, [address] [place] [address]  [id number]

## 2022-08-12 ENCOUNTER — HOSPITAL ENCOUNTER (OUTPATIENT)
Dept: PHYSICAL THERAPY | Age: 37
Setting detail: THERAPIES SERIES
Discharge: HOME | End: 2022-08-12

## 2022-08-12 NOTE — PROGRESS NOTES
Physical Therapy     Physical Therapy Discharge Summary    Dear Simba Alcantara MD  ,    We had the pleasure of treating the following patient for physical therapy services at [facility]. A summary of our findings can be found in the discharge summary below. If you have any questions or concerns regarding these findings, please do not hesitate to contact me at the office phone number checked above. Thank you for the referral.     Physician Signature:________________________________Date:__________________  By signing above (or electronic signature), therapists plan is approved by physician        Overall Response to Treatment:   []Patient is responding well to treatment and improvement is noted with regards  to goals   []Patient should continue to improve in reasonable time if they continue HEP   []Patient has plateaued and is no longer responding to skilled PT intervention    []Patient is getting worse and would benefit from return to referring MD   []Patient unable to adhere to initial POC   [x]Other: Patient last attended PT on 7/29/2022 and has since canceleed and had 3 no shows in a row. She is being discharged due to not following attendance policy nor responding to phone messages left when she has no showed. Date range of Visits: eval-8/12/2022  Total Visits: 15    Recommendation:    [x] Discharge to HEP. Follow up with PT or MD PRN.

## 2022-08-12 NOTE — FLOWSHEET NOTE
Physical Therapy/  Cancellation/No-show Note  Patient Name:  Gisele Mccullough  :  1985   Date:  2022  Cancelled visits to date: 4  6/3, , , 8/3  No-shows to date: 2  6/15, , 8/10,     Patient status for today's appointment patient:  []  Cancelled  []  Rescheduled appointment  [x]  No-show     Reason given by patient:  []  Patient ill  []  Conflicting appointment  []  No transportation    []  Conflict with work  [x]  No reason given  []  Other:     Comments:  no call no show  Phone call information:   []  Phone call made today to patient at _ time at number provided:      []  Patient answered, conversation as follows:    []  Patient did not answer, message left as follows:Left message about missed appointment and reminded patient regarding her final appointment this Friday. Patient was starting a new job this week which may be the reason she did not attend PT. [x]  Phone call not made today; 3rd no show; patient discharged due to poor attendance see last PT onote on  for final measurements and function  []  Phone call not needed - pt contacted us to cancel and provided reason for cancellation.      Electronically signed by:  Christiano Hernandez, [facility]  [id number]

## 2023-01-11 ENCOUNTER — HOSPITAL ENCOUNTER (EMERGENCY)
Age: 38
Discharge: HOME | End: 2023-01-11
Attending: EMERGENCY MEDICINE
Payer: COMMERCIAL

## 2023-01-11 VITALS
WEIGHT: 143.9 LBS | SYSTOLIC BLOOD PRESSURE: 119 MMHG | OXYGEN SATURATION: 96 % | BODY MASS INDEX: 23.97 KG/M2 | HEART RATE: 79 BPM | HEIGHT: 65 IN | TEMPERATURE: 99.2 F | DIASTOLIC BLOOD PRESSURE: 83 MMHG | RESPIRATION RATE: 18 BRPM

## 2023-01-11 DIAGNOSIS — R10.33 PERIUMBILICAL ABDOMINAL PAIN: ICD-10-CM

## 2023-01-11 DIAGNOSIS — R10.11 RIGHT UPPER QUADRANT ABDOMINAL PAIN: Primary | ICD-10-CM

## 2023-01-11 LAB
A/G RATIO: 1.2 (ref 1.1–2.2)
ALBUMIN SERPL-MCNC: 4.2 G/DL (ref 3.4–5)
ALP BLD-CCNC: 79 U/L (ref 40–129)
ALT SERPL-CCNC: 12 U/L (ref 10–40)
ANION GAP SERPL CALCULATED.3IONS-SCNC: 8 MMOL/L (ref 3–16)
AST SERPL-CCNC: 16 U/L (ref 15–37)
BASOPHILS ABSOLUTE: 0.1 K/UL (ref 0–0.2)
BASOPHILS RELATIVE PERCENT: 0.8 %
BILIRUB SERPL-MCNC: 1.1 MG/DL (ref 0–1)
BILIRUBIN URINE: NEGATIVE
BLOOD, URINE: NEGATIVE
BUN BLDV-MCNC: 17 MG/DL (ref 7–20)
CALCIUM SERPL-MCNC: 9.8 MG/DL (ref 8.3–10.6)
CHLORIDE BLD-SCNC: 103 MMOL/L (ref 99–110)
CLARITY: CLEAR
CO2: 26 MMOL/L (ref 21–32)
COLOR: YELLOW
CREAT SERPL-MCNC: 0.7 MG/DL (ref 0.6–1.1)
EOSINOPHILS ABSOLUTE: 0.2 K/UL (ref 0–0.6)
EOSINOPHILS RELATIVE PERCENT: 2.1 %
GFR SERPL CREATININE-BSD FRML MDRD: >60 ML/MIN/{1.73_M2}
GLUCOSE BLD-MCNC: 98 MG/DL (ref 70–99)
GLUCOSE URINE: NEGATIVE MG/DL
HCG(URINE) PREGNANCY TEST: NEGATIVE
HCT VFR BLD CALC: 40.6 % (ref 36–48)
HEMOGLOBIN: 13.4 G/DL (ref 12–16)
KETONES, URINE: NEGATIVE MG/DL
LEUKOCYTE ESTERASE, URINE: NEGATIVE
LIPASE: 30 U/L (ref 13–60)
LYMPHOCYTES ABSOLUTE: 2.7 K/UL (ref 1–5.1)
LYMPHOCYTES RELATIVE PERCENT: 32.5 %
MCH RBC QN AUTO: 27.9 PG (ref 26–34)
MCHC RBC AUTO-ENTMCNC: 32.9 G/DL (ref 31–36)
MCV RBC AUTO: 84.7 FL (ref 80–100)
MICROSCOPIC EXAMINATION: NORMAL
MONOCYTES ABSOLUTE: 0.5 K/UL (ref 0–1.3)
MONOCYTES RELATIVE PERCENT: 6.2 %
NEUTROPHILS ABSOLUTE: 4.9 K/UL (ref 1.7–7.7)
NEUTROPHILS RELATIVE PERCENT: 58.4 %
NITRITE, URINE: NEGATIVE
PDW BLD-RTO: 13.5 % (ref 12.4–15.4)
PH UA: 5.5 (ref 5–8)
PLATELET # BLD: 259 K/UL (ref 135–450)
PMV BLD AUTO: 8.7 FL (ref 5–10.5)
POTASSIUM REFLEX MAGNESIUM: 3.9 MMOL/L (ref 3.5–5.1)
PROTEIN UA: NEGATIVE MG/DL
RBC # BLD: 4.79 M/UL (ref 4–5.2)
SODIUM BLD-SCNC: 137 MMOL/L (ref 136–145)
SPECIFIC GRAVITY UA: 1.01 (ref 1–1.03)
TOTAL PROTEIN: 7.6 G/DL (ref 6.4–8.2)
URINE TYPE: NORMAL
UROBILINOGEN, URINE: 0.2 E.U./DL
WBC # BLD: 8.4 K/UL (ref 4–11)

## 2023-01-11 PROCEDURE — 85025 COMPLETE CBC W/AUTO DIFF WBC: CPT

## 2023-01-11 PROCEDURE — 80053 COMPREHEN METABOLIC PANEL: CPT

## 2023-01-11 PROCEDURE — 99284 EMERGENCY DEPT VISIT MOD MDM: CPT

## 2023-01-11 PROCEDURE — 36415 COLL VENOUS BLD VENIPUNCTURE: CPT

## 2023-01-11 PROCEDURE — 81003 URINALYSIS AUTO W/O SCOPE: CPT

## 2023-01-11 PROCEDURE — 84703 CHORIONIC GONADOTROPIN ASSAY: CPT

## 2023-01-11 PROCEDURE — 83690 ASSAY OF LIPASE: CPT

## 2023-01-11 PROCEDURE — 2580000003 HC RX 258: Performed by: EMERGENCY MEDICINE

## 2023-01-11 RX ORDER — ONDANSETRON 2 MG/ML
4 INJECTION INTRAMUSCULAR; INTRAVENOUS EVERY 30 MIN PRN
Status: DISCONTINUED | OUTPATIENT
Start: 2023-01-11 | End: 2023-01-11

## 2023-01-11 RX ORDER — 0.9 % SODIUM CHLORIDE 0.9 %
1000 INTRAVENOUS SOLUTION INTRAVENOUS ONCE
Status: COMPLETED | OUTPATIENT
Start: 2023-01-11 | End: 2023-01-11

## 2023-01-11 RX ADMIN — SODIUM CHLORIDE 1000 ML: 9 INJECTION, SOLUTION INTRAVENOUS at 23:09

## 2023-01-11 ASSESSMENT — PAIN - FUNCTIONAL ASSESSMENT: PAIN_FUNCTIONAL_ASSESSMENT: 0-10

## 2023-01-11 ASSESSMENT — LIFESTYLE VARIABLES
HOW MANY STANDARD DRINKS CONTAINING ALCOHOL DO YOU HAVE ON A TYPICAL DAY: 1 OR 2
HOW OFTEN DO YOU HAVE A DRINK CONTAINING ALCOHOL: MONTHLY OR LESS

## 2023-01-11 ASSESSMENT — PAIN SCALES - GENERAL: PAINLEVEL_OUTOF10: 4

## 2023-01-11 NOTE — Clinical Note
Cynthia Palafox was seen and treated in our emergency department on 1/11/2023. She may return to work on 01/12/2023. If you have any questions or concerns, please don't hesitate to call.       Bárbara Kim MD

## 2023-01-12 NOTE — ED PROVIDER NOTES
Emergency Physician Note  Kimberly [address]  [place] [id number]  Dept: [phone]  Loc: [phone]  Open Note Time:  10:12 PM EST    Chief Complaint  Abdominal Pain (Umbilical and RUQ pain, thought it could be gas pain. Patient took OTC medications and had bowel movements but pain persisted. Patient states has  history of kidney issues so unsure if it could be UTI. )       History of Present Illness  Zunilda Wills is a 40 y.o. female  has a past medical history of Migraine, Thyroid disease, and Ureteral reflux. who presents to the ED for pain. Patient states that today is day 3 of abdominal pain. Pain initially started in the right upper quadrant and she describes it as sharp and some radiation around the right flank and then she also noticed some pain that is also sharp in the umbilicus region. She has not noticed if eating has made the pain worse. Patient states the pain was pretty much constant until today when she went to work (patient works an overnight shift) and she said that it seems truncal movement is helping her pain feel better at this time she says she has very minimal pain. Patient does have a history of kidney stones and UTI and she says this does not feel like similar symptoms that she has had in the past.  Patient reports she still having bowel movements and she is still passing gas. Denies fever,   chest pain, shortness of breath, cough,  nausea, vomiting, diarrhea, headache, sore throat, dysuria,  rash. No palliative/provocative factors.        Unless otherwise stated in this report or unable to obtain because of the patient's clinical or mental status as evidenced by the medical record, this patient's positive and negative responses for review of systems, constitutional, psych, eyes, ENT, cardiovascular, respiratory, gastrointestinal, neurological, genitourinary, musculoskeletal, integument systems and systems related to the presenting problem are either stated in the preceding paragraph or were not pertinent or were negative for the symptoms and/or complaints related to the medical problem. I have reviewed the following from the nursing documentation:      Prior to Admission medications    Not on File       Allergies as of 01/11/2023 - Fully Reviewed 01/11/2023   Allergen Reaction Noted    Keflex [cephalexin] Rash 04/24/2016       Past Medical History:   Diagnosis Date    Migraine     Thyroid disease     Ureteral reflux         Surgical History:   Past Surgical History:   Procedure Laterality Date    ANTERIOR CRUCIATE LIGAMENT REPAIR Right     BLADDER SURGERY      for reflux    KNEE ARTHROSCOPY Right 5/6/2022    RIGHT KNEE EXAM UNDER ANESTHESIA WITH ARTHROSCOPIC MEDIAL MENISCUS REPAIR performed by Noris Flores MD at [address] [place] [address] Right     [address] [place] [address]          Family History:    Family History   Problem Relation Age of Onset    Heart Disease Mother     No Known Problems Father     No Known Problems Sister     No Known Problems Brother     No Known Problems Maternal Grandmother     No Known Problems Maternal Grandfather     No Known Problems Paternal Grandmother     No Known Problems Paternal Grandfather     No Known Problems Daughter     Heart Defect Daughter     Heart Defect Daughter        Social History     Socioeconomic History    Marital status: Single     Spouse name: Not on file    Number of children: Not on file    Years of education: Not on file    Highest education level: Not on file   Occupational History    Not on file   Tobacco Use    Smoking status: Former     Packs/day: 0.50     Types: Cigarettes     Quit date: 9/30/2019     Years since quitting: 3.2    Smokeless tobacco: Current    Tobacco comments:     Vaps   Vaping Use    Vaping Use: Every day    Substances: Nicotine   Substance and Sexual Activity    Alcohol use: Yes     Comment: social    Drug use:  Yes Frequency: 2.0 times per week     Types: Marijuana Daril Dennis)     Comment: smoked last night    Sexual activity: Yes     Partners: Male   Other Topics Concern    Not on file   Social History Narrative    Not on file     Social Determinants of Health     Financial Resource Strain: Not on file   Food Insecurity: Not on file   Transportation Needs: Not on file   Physical Activity: Not on file   Stress: Not on file   Social Connections: Not on file   Intimate Partner Violence: Not on file   Housing Stability: Not on file      has a past medical history of Migraine, Thyroid disease, and Ureteral reflux. Nursing notes reviewed. ED Triage Vitals [01/11/23 2157]   Enc Vitals Group      /83      Heart Rate 79      Resp 18      Temp 99.2 °F (37.3 °C)      Temp Source Oral      SpO2 96 %      Weight 143 lb 14.4 oz (65.3 kg)      Height 5' 5\" (1.651 m)      Head Circumference       Peak Flow       Pain Score       Pain Loc       Pain Edu? Excl. in [address]? GENERAL:   Body mass index is 23.95 kg/m². Awake, alert. Well developed, well nourished with no apparent distress. Nontoxic-appearing, non-ill-appearing. HENT:   Normocephalic, Atraumatic, no lacerations. No ENT exam due to PPE. EYES:   Conjunctiva normal,   Pupils equal round and reactive to light,   Extraocular movements normal.  NECK:  Trachea is midline. No stridor. CHEST:  Regular rate and regular rhythm, no murmurs/rubs/gallops,  normal S1/S2, chest wall non-tender. LUNGS:  No respiratory distress. No abdominal retractions, no sternal retractions  Clear to auscultation bilaterally, no wheezing, no rhochi, no rales  Speaking comfortably in full sentences  ABDOMEN:  Soft, non-tender, non-distended. Unable to reproduce abdominal tenderness with palpation of the right upper quadrant nor in the umbilicus region  No guarding. No rebound. No costovertebral angle tenderness to palpation.   Normal BS, no organomegaly, no abdominal masses  EXTREMITIES:  Moves extremities x4 with purpose. Normal range of motion, no edema,  No tenderness, no deformity,  distal pulses present and equal bilaterally. BACK:  No midline tenderness in the cervical, thoracic, and lumbar spine. No deformities, no step-off. SKIN:  Warm, dry and intact. NEUROLOGIC:  Normal mental status. Moving all extremities to command. Alert and oriented x4  without focal motor deficit or gross sensory deficit. Normal speech. PSYCHIATRIC:  Not anxious,  normal mood and affect,  Appropriate eye contact,  thoughts are linear and organized,  without delusions/hallucinations,  Not responding to internal stimuli,  responds appropriately to questions    LABS and DIAGNOSTIC RESULTS      RADIOLOGY  X-RAYS:  I have reviewed radiologic plain film image(s). ALL OTHER NON-PLAIN FILM IMAGES SUCH AS CT, ULTRASOUND AND MRI HAVE BEEN READ BY THE RADIOLOGIST. No orders to display        Imaging from past 7 days  No results found.       LABS  Results for orders placed or performed during the hospital encounter of 01/11/23   CBC Auto Differential   Result Value Ref Range    WBC 8.4 4.0 - 11.0 K/uL    RBC 4.79 4.00 - 5.20 M/uL    Hemoglobin 13.4 12.0 - 16.0 g/dL    Hematocrit 40.6 36.0 - 48.0 %    MCV 84.7 80.0 - 100.0 fL    MCH 27.9 26.0 - 34.0 pg    MCHC 32.9 31.0 - 36.0 g/dL    RDW 13.5 12.4 - 15.4 %    Platelets 722 947 - 817 K/uL    MPV 8.7 5.0 - 10.5 fL    Neutrophils % 58.4 %    Lymphocytes % 32.5 %    Monocytes % 6.2 %    Eosinophils % 2.1 %    Basophils % 0.8 %    Neutrophils Absolute 4.9 1.7 - 7.7 K/uL    Lymphocytes Absolute 2.7 1.0 - 5.1 K/uL    Monocytes Absolute 0.5 0.0 - 1.3 K/uL    Eosinophils Absolute 0.2 0.0 - 0.6 K/uL    Basophils Absolute 0.1 0.0 - 0.2 K/uL   Comprehensive Metabolic Panel w/ Reflex to MG   Result Value Ref Range    Sodium 137 136 - 145 mmol/L    Potassium reflex Magnesium 3.9 3.5 - 5.1 mmol/L    Chloride 103 99 - 110 mmol/L    CO2 26 21 - 32 mmol/L    Anion Gap 8 3 - 16    Glucose 98 70 - 99 mg/dL    BUN 17 7 - 20 mg/dL    Creatinine 0.7 0.6 - 1.1 mg/dL    Est, Glom Filt Rate >60 >60    Calcium 9.8 8.3 - 10.6 mg/dL    Total Protein 7.6 6.4 - 8.2 g/dL    Albumin 4.2 3.4 - 5.0 g/dL    Albumin/Globulin Ratio 1.2 1.1 - 2.2    Total Bilirubin 1.1 (H) 0.0 - 1.0 mg/dL    Alkaline Phosphatase 79 40 - 129 U/L    ALT 12 10 - 40 U/L    AST 16 15 - 37 U/L   Lipase   Result Value Ref Range    Lipase 30.0 13.0 - 60.0 U/L   Urinalysis, reflex to microscopic   Result Value Ref Range    Color, UA Yellow Straw/Yellow    Clarity, UA Clear Clear    Glucose, Ur Negative Negative mg/dL    Bilirubin Urine Negative Negative    Ketones, Urine Negative Negative mg/dL    Specific Gravity, UA 1.010 1.005 - 1.030    Blood, Urine Negative Negative    pH, UA 5.5 5.0 - 8.0    Protein, UA Negative Negative mg/dL    Urobilinogen, Urine 0.2 <2.0 E.U./dL    Nitrite, Urine Negative Negative    Leukocyte Esterase, Urine Negative Negative    Microscopic Examination Not Indicated     Urine Type Cleancatch    Pregnancy, Urine   Result Value Ref Range    HCG(Urine) Pregnancy Test Negative Detects HCG level >20 MIU/mL       SCREENINGS  NIH Score     Glascow  Ralston Coma Scale  Eye Opening: Spontaneous  Best Verbal Response: Oriented  Best Motor Response: Obeys commands  Ralston Coma Scale Score: 15  [facility]    Heart Score         PROCEDURES  Unless otherwise noted below, none    Procedures    MEDICAL DECISION MAKING  I am the Primary Clinician of Record.     Procedures/interventions/images ordered for this visit  Orders Placed This Encounter   Procedures    CBC Auto Differential    Comprehensive Metabolic Panel w/ Reflex to MG    Lipase    Urinalysis, reflex to microscopic    Pregnancy, Urine    Saline lock IV       Medications ordered for this visit  Medications   0.9 % sodium chloride bolus (1,000 mLs IntraVENous New Bag 1/11/23 7024)       ED course notes for this visit       Is this patient to be included in the SEP-1 Core Measure due to severe sepsis or septic shock?   No   Exclusion criteria - the patient is NOT to be included for SEP-1 Core Measure due to:  2+ SIRS criteria are not met    I evaluated the patient in room [place] 2/[facility]-02      Consults ordered:  None  Discussion with Other Professionals : None    Social Determinants : None    Chronic Conditions:  See HPI and PMHx    Records Reviewed : Care Everywhere patient had a meniscus tear repair in May 2022 by orthopedic surgery, she had 1 follow-up in the ER because she was having difficulty with placing the brace on her leg.  Since then patient's had multiple visits for physical therapy but has not had any ER visits    Appropriate for outpatient management with primary care physician follow-up         Disposition Considerations   (include 1 Tests not done, Shared Decision Making, Pt expectation of Test or Tx.):  Differential diagnosis: Abdominal Aortic Aneurysm, Acute Coronary Syndrome, Ischemic Bowel, Bowel Obstruction (including Gastric Outlet Obstruction), PUD, GERD, Acute Cholecystitis, Pancreatitis, Hepatitis, Colitis, SMA Syndrome, Mesenteric Steal Syndrome, Splanchnic Vein Thrombosis, Appendicitis, Diverticulitis, Pyelonephritis, UTI, STD, Gonad Torsion, Ureterolithiasis      This is a very pleasant patient presents with abdominal pain of unclear etiology. At the time of discharge, patient is without significant evidence of toxicity, shock, sepsis, hemodynamic or cardiopulmonary instability, acute appendicitis, acute cholecystitis, AAA, bowel obstruction, bowel perforation, herpes zoster, a cardiac or pulmonary etiology as a cause for the abdominal symptoms, or any disease process requiring other immediate surgical or medical intervention at this time.      Based on the physical exam, laboratory studies, and lack of significant risk factors for emergent pathologies, I have a very low suspicion for a surgical emergency or any other  life-threatening disease process at this time. Therefore, I dont think that there is an indication at this time for any further actions emergently. I have discussed with the patient the level of uncertainty with undifferentiated abdominal pain and that it may be too early in the disease process to make a definitive diagnosis of all causes of serious causes of abdominal pain. On repeat physical exam, patient continues to have a benign abdominal exam.  Pain management and follow-up plan were discussed with the patient. I have clearly explained the need to follow-up as noted on the discharge instructions. The patient understands that all disease processes change over time and therefore close follow up and serial exams are mandatory should the symptoms not resolve completely. The patient understands to return to the Emergency Department immediately if the pain worsens, develops fever, persistent and uncontrollable vomiting, or for any new symptoms or concerns. Otherwise, they will follow the discharge instructions to arrange close follow up for a re-evaluation. It is understood that if the patient is not improving as expected or if other new symptoms or signs of concern develop, other etiologies or diagnoses may need to be considered requiring other tests, treatments, consultations, and/or admission. The diagnosis, plan, expected course, follow-up, and return precautions were discussed and all questions were answered. Final Impression    1. Right upper quadrant abdominal pain    2. Periumbilical abdominal pain        Blood pressure 119/83, pulse 79, temperature 99.2 °F (37.3 °C), temperature source Oral, resp. rate 18, height 5' 5\" (1.651 m), weight 143 lb 14.4 oz (65.3 kg), SpO2 96 %, not currently breastfeeding. Medication Summary  Patient was given scripts for the following medications. I counseled patient how to take these medications.   New Prescriptions    No medications on file Patient had scripts modified or refilled for the following medications. I counseled patient how to take these medications. Modified Medications    No medications on file       Patient had scripts discontinues for the following medications. I counseled patient to stop taking these medications. Discontinued Medications    ARIPIPRAZOLE (ABILIFY) 5 MG TABLET    Take 1 tablet by mouth daily    ASPIRIN 325 MG EC TABLET    Take 1 tablet by mouth daily for 14 days    ESCITALOPRAM (LEXAPRO) 20 MG TABLET    Take 1 tablet by mouth daily    PANTOPRAZOLE (PROTONIX) 40 MG TABLET    Take 40 mg by mouth daily    SUMATRIPTAN (IMITREX) 25 MG TABLET    Take 1 tablet by mouth once as needed for Migraine May repeat in 2 hours if not improving- max daily 200mg. Disposition  At this point I do not feel the patient requires further work up and it is reasonable to discharge the patient. I had a discussion with the patient and/or their surrogate regarding diagnosis, diagnostic testing results, treatment/ plan of care, and follow up. Patient and/or companions verbalized understanding of the ED workup, any relevant findings as well as any incidental findings, and the disposition and plan. There was shared decision-making between myself as well as the patient and/or their surrogate and we are all in agreement with discharge home. There was an opportunity for questions and all questions were answered to the best of my ability and to the satisfaction of the patient and/or patient's family. Patient agreed to follow up as recommend for further evaluation/treatment. The patient was given strict return precautions as we discussed symptoms that would necessitate return to the ED. Patient will return to ED for new/worsening symptoms. The patient verbalized their understanding and agreement with the above plan. Please refer to AVS for further details regarding discharge instructions.     Pt is in stable condition upon Discharge to home.    Pt was seen during the [place] 19 pandemic. Appropriate PPE worn by this writer during patient encounters. Pt seen during a time with constrained hospital bed capacity and other potential inpatient and outpatient resources were constrained due to the viral pandemic. The note was completed using Dragon voice recognition transcription. Every effort was made to ensure accuracy; however, inadvertent transcription errors may be present despite my best efforts to edit errors.     Megha Babcock MD  [address] [facility]        Megha Babcock MD  01/11/23 6870

## 2024-03-07 ENCOUNTER — OFFICE VISIT (OUTPATIENT)
Age: 39
End: 2024-03-07

## 2024-03-07 VITALS
SYSTOLIC BLOOD PRESSURE: 138 MMHG | DIASTOLIC BLOOD PRESSURE: 89 MMHG | OXYGEN SATURATION: 97 % | HEIGHT: 65 IN | TEMPERATURE: 98.4 F | BODY MASS INDEX: 25.33 KG/M2 | WEIGHT: 152 LBS | HEART RATE: 67 BPM

## 2024-03-07 DIAGNOSIS — B02.8 HERPES ZOSTER WITH COMPLICATION: Primary | ICD-10-CM

## 2024-03-07 DIAGNOSIS — L03.317 CELLULITIS AND ABSCESS OF BUTTOCK: ICD-10-CM

## 2024-03-07 DIAGNOSIS — L02.31 CELLULITIS AND ABSCESS OF BUTTOCK: ICD-10-CM

## 2024-03-07 RX ORDER — ACYCLOVIR 400 MG/1
400 TABLET ORAL
Qty: 50 TABLET | Refills: 0 | Status: SHIPPED | OUTPATIENT
Start: 2024-03-07 | End: 2024-03-17

## 2024-03-07 NOTE — PROGRESS NOTES
Rosalee Avila (:  1985) is a 38 y.o. female,New patient, here for evaluation of the following chief complaint(s):  Rash (Rash of raised white bumps on left buttock, first noticed it 3 days ago, it has spread since then. Not itchy, but painful. Also has a rash on right buttock that keeps returning. )      ASSESSMENT/PLAN:  1. Herpes zoster with complication  - acyclovir (ZOVIRAX) 400 MG tablet; Take 1 tablet by mouth 5 times daily for 10 days  Dispense: 50 tablet; Refill: 0    2. Cellulitis and abscess of buttock  - mupirocin (BACTROBAN) 2 % ointment; Apply topically 3 times daily.  Dispense: 30 g; Refill: 0       Return if symptoms worsen or fail to improve.    SUBJECTIVE/OBJECTIVE:  PRESENT TO CLINIC WITH RASH ON LEFT BUTTOCK FOR 3 DAYS AND ANOTHER SKIN ERYTHEMA ON RIGHT BUTTOCK ON AND OFF FOR 1 YEAR.NO FEVER. NO DISCHARGE      History provided by:  Patient  Rash        Vitals:    24 1516   BP: 138/89   Pulse: 67   Temp: 98.4 °F (36.9 °C)   TempSrc: Oral   SpO2: 97%   Weight: 68.9 kg (152 lb)   Height: 1.651 m (5' 5\")       Review of Systems   Skin:  Positive for rash.       Physical Exam  Constitutional:       Appearance: Normal appearance.   HENT:      Head: Normocephalic and atraumatic.      Nose: Nose normal.      Mouth/Throat:      Mouth: Mucous membranes are moist.   Eyes:      Pupils: Pupils are equal, round, and reactive to light.   Pulmonary:      Effort: Pulmonary effort is normal.   Musculoskeletal:         General: Normal range of motion.      Cervical back: Normal range of motion and neck supple.   Skin:     Findings: Erythema and rash present.   Neurological:      Mental Status: She is alert and oriented to person, place, and time.   Psychiatric:         Mood and Affect: Mood normal.           An electronic signature was used to authenticate this note.    --Jacki Kan DO

## 2024-07-01 ENCOUNTER — OFFICE VISIT (OUTPATIENT)
Dept: FAMILY MEDICINE CLINIC | Age: 39
End: 2024-07-01
Payer: COMMERCIAL

## 2024-07-01 VITALS
HEIGHT: 65 IN | BODY MASS INDEX: 25.49 KG/M2 | WEIGHT: 153 LBS | OXYGEN SATURATION: 98 % | SYSTOLIC BLOOD PRESSURE: 120 MMHG | DIASTOLIC BLOOD PRESSURE: 80 MMHG | HEART RATE: 76 BPM

## 2024-07-01 DIAGNOSIS — R21 SKIN RASH: Primary | ICD-10-CM

## 2024-07-01 DIAGNOSIS — K21.9 GASTROESOPHAGEAL REFLUX DISEASE, UNSPECIFIED WHETHER ESOPHAGITIS PRESENT: ICD-10-CM

## 2024-07-01 DIAGNOSIS — F41.9 ANXIETY: ICD-10-CM

## 2024-07-01 PROCEDURE — 4004F PT TOBACCO SCREEN RCVD TLK: CPT | Performed by: NURSE PRACTITIONER

## 2024-07-01 PROCEDURE — 99214 OFFICE O/P EST MOD 30 MIN: CPT | Performed by: NURSE PRACTITIONER

## 2024-07-01 PROCEDURE — G8427 DOCREV CUR MEDS BY ELIG CLIN: HCPCS | Performed by: NURSE PRACTITIONER

## 2024-07-01 PROCEDURE — G8419 CALC BMI OUT NRM PARAM NOF/U: HCPCS | Performed by: NURSE PRACTITIONER

## 2024-07-01 RX ORDER — HYDROXYZINE PAMOATE 25 MG/1
25 CAPSULE ORAL 3 TIMES DAILY PRN
Qty: 90 CAPSULE | Refills: 0 | Status: SHIPPED | OUTPATIENT
Start: 2024-07-01 | End: 2024-07-31

## 2024-07-01 RX ORDER — VALACYCLOVIR HYDROCHLORIDE 1 G/1
1000 TABLET, FILM COATED ORAL 3 TIMES DAILY
Qty: 21 TABLET | Refills: 0 | Status: SHIPPED | OUTPATIENT
Start: 2024-07-01

## 2024-07-01 RX ORDER — PANTOPRAZOLE SODIUM 20 MG/1
20 TABLET, DELAYED RELEASE ORAL
Qty: 90 TABLET | Refills: 1 | Status: SHIPPED | OUTPATIENT
Start: 2024-07-01

## 2024-07-01 RX ORDER — VILAZODONE HYDROCHLORIDE 10 MG/1
10 TABLET ORAL DAILY
Qty: 30 TABLET | Refills: 0 | Status: SHIPPED | OUTPATIENT
Start: 2024-07-01

## 2024-07-01 NOTE — PROGRESS NOTES
Rosalee Avila (:  1985) is a 38 y.o. female,Established patient, here for evaluation of the following chief complaint(s):  Rash ( right sided butt area pt several episodes in last year/Treated for shingles)      Assessment & Plan   1. Skin rash  Not progressing;  Begin valtrex.  Referral to dermatology.  -     [facility] - Mary Mckeon MD, Dermatology, [facility]  -     valACYclovir (VALTREX) 1 g tablet; Take 1 tablet by mouth 3 times daily, Disp-21 tablet, R-0Normal  2. Anxiety  Not progressing;  Begin viibryd and restart vistaril.  Risks and benefits discussed.  Labs ordered.  -     vilazodone HCl (VIIBRYD) 10 MG TABS; Take 1 tablet by mouth daily, Disp-30 tablet, R-0Normal  -     Lipid Panel; Future  -     Comprehensive Metabolic Panel; Future  -     TSH; Future  -     CBC; Future  -     Vitamin D 25 Hydroxy; Future  -     Vitamin B12; Future  -     T4, Free; Future  -     hydrOXYzine pamoate (VISTARIL) 25 MG capsule; Take 1 capsule by mouth 3 times daily as needed for Anxiety, Disp-90 capsule, R-0Normal  3. Gastroesophageal reflux disease, unspecified whether esophagitis present  Not progressing;  Begin protonix.  Encouraged patient to f/u with GI provider.  -     pantoprazole (PROTONIX) 20 MG tablet; Take 1 tablet by mouth every morning (before breakfast), Disp-90 tablet, R-1Normal      Return in about 4 weeks (around 2024).       Subjective   HPI  Skin rash  X 1 year  Dx with shingles at  in March- started on acyclovir- didn't help, bactroban- no improvement  Rash keeps coming back- 10-12x on the right side; once on the left (went to  at this time)  Has scars from them  Has 2 spots on right side are painful  Do have drainage and then crust over- red, bumps over the next days  [facility] 1 month ago- valtrex    Anxiety  Stress  Stopped taking everything  Has a car and better job  Smoother life  Has difficulty sleeping- is jolting awake  Lexapro worked for a little while  Added

## 2024-07-03 ENCOUNTER — TELEPHONE (OUTPATIENT)
Dept: ADMINISTRATIVE | Age: 39
End: 2024-07-03

## 2024-07-03 NOTE — TELEPHONE ENCOUNTER
Started PA for Viibryd 10MG tablets  Via CMM Key: [id number]  STATUS: NOT SENT    Please advise when chart note dated 7/1/24 is signed so that I can submit that with the PA form.      Please respond to the pool ( P [facility] MEDICATION PRE-AUTH).      Thank you!

## 2024-07-24 NOTE — TELEPHONE ENCOUNTER
PA for Viibryd 10MG tablets (Brand) was submitted and APPROVED for 365 days. The PA# assigned is [id number]. Authorization Expiration Date: 7/22/2025     If this requires a response please respond to the pool ( P [facility] MEDICATION PRE-AUTH).      Thank you please advise patient.

## 2024-07-30 ENCOUNTER — OFFICE VISIT (OUTPATIENT)
Dept: FAMILY MEDICINE CLINIC | Age: 39
End: 2024-07-30
Payer: COMMERCIAL

## 2024-07-30 VITALS
WEIGHT: 158 LBS | HEIGHT: 65 IN | OXYGEN SATURATION: 98 % | HEART RATE: 77 BPM | DIASTOLIC BLOOD PRESSURE: 80 MMHG | SYSTOLIC BLOOD PRESSURE: 122 MMHG | BODY MASS INDEX: 26.33 KG/M2

## 2024-07-30 DIAGNOSIS — R21 SKIN RASH: ICD-10-CM

## 2024-07-30 DIAGNOSIS — F43.22 ADJUSTMENT DISORDER WITH ANXIOUS MOOD: Primary | ICD-10-CM

## 2024-07-30 PROCEDURE — G8428 CUR MEDS NOT DOCUMENT: HCPCS | Performed by: NURSE PRACTITIONER

## 2024-07-30 PROCEDURE — G8419 CALC BMI OUT NRM PARAM NOF/U: HCPCS | Performed by: NURSE PRACTITIONER

## 2024-07-30 PROCEDURE — 4004F PT TOBACCO SCREEN RCVD TLK: CPT | Performed by: NURSE PRACTITIONER

## 2024-07-30 PROCEDURE — 99212 OFFICE O/P EST SF 10 MIN: CPT | Performed by: NURSE PRACTITIONER

## 2024-07-30 ASSESSMENT — PATIENT HEALTH QUESTIONNAIRE - PHQ9
SUM OF ALL RESPONSES TO PHQ QUESTIONS 1-9: 10
SUM OF ALL RESPONSES TO PHQ9 QUESTIONS 1 & 2: 1
2. FEELING DOWN, DEPRESSED OR HOPELESS: NOT AT ALL
3. TROUBLE FALLING OR STAYING ASLEEP: MORE THAN HALF THE DAYS
5. POOR APPETITE OR OVEREATING: MORE THAN HALF THE DAYS
7. TROUBLE CONCENTRATING ON THINGS, SUCH AS READING THE NEWSPAPER OR WATCHING TELEVISION: SEVERAL DAYS
4. FEELING TIRED OR HAVING LITTLE ENERGY: MORE THAN HALF THE DAYS
1. LITTLE INTEREST OR PLEASURE IN DOING THINGS: SEVERAL DAYS
SUM OF ALL RESPONSES TO PHQ QUESTIONS 1-9: 10
SUM OF ALL RESPONSES TO PHQ QUESTIONS 1-9: 10
6. FEELING BAD ABOUT YOURSELF - OR THAT YOU ARE A FAILURE OR HAVE LET YOURSELF OR YOUR FAMILY DOWN: SEVERAL DAYS
8. MOVING OR SPEAKING SO SLOWLY THAT OTHER PEOPLE COULD HAVE NOTICED. OR THE OPPOSITE, BEING SO FIGETY OR RESTLESS THAT YOU HAVE BEEN MOVING AROUND A LOT MORE THAN USUAL: SEVERAL DAYS
10. IF YOU CHECKED OFF ANY PROBLEMS, HOW DIFFICULT HAVE THESE PROBLEMS MADE IT FOR YOU TO DO YOUR WORK, TAKE CARE OF THINGS AT HOME, OR GET ALONG WITH OTHER PEOPLE: SOMEWHAT DIFFICULT
SUM OF ALL RESPONSES TO PHQ QUESTIONS 1-9: 10
9. THOUGHTS THAT YOU WOULD BE BETTER OFF DEAD, OR OF HURTING YOURSELF: NOT AT ALL

## 2024-07-30 NOTE — PROGRESS NOTES
Rosalee Avila (:  1985) is a 39 y.o. female,Established patient, here for evaluation of the following chief complaint(s):  Follow-up (Rash has come back, vybrid  not started yet, just got approved )      Assessment & Plan   1. Adjustment disorder with anxious mood  Not progressing;  Patient's Viibryd was finally approved.  Will start ASAP.  2. Skin rash  Not progressing;  Referral to dermatology.  -     Non [facility] - External Referral To Dermatology      Return in about 4 weeks (around 2024).       Subjective   HPI  Anxiety  Decreased motivation at home  Some days feeling nervous/ on edge  Viibryd- just got approved; has not taken, vistaril- takes at night to sleep, helps a little    Skin rash  Got another spot- comes back monthly  Hx of taking valtrex    Review of Systems       Objective   Physical Exam  Vitals reviewed.   Constitutional:       Appearance: Normal appearance.   HENT:      Head: Normocephalic.      Right Ear: External ear normal.      Left Ear: External ear normal.      Nose: Nose normal.   Pulmonary:      Effort: No respiratory distress.   Neurological:      Mental Status: She is alert.   Psychiatric:         Mood and Affect: Mood normal.         Thought Content: Thought content does not include suicidal ideation. Thought content does not include suicidal plan.       An electronic signature was used to authenticate this note.    --CHARLEE Calabrese - CNP

## 2024-08-27 DIAGNOSIS — F41.9 ANXIETY: ICD-10-CM

## 2024-08-27 RX ORDER — VILAZODONE HYDROCHLORIDE 10 MG/1
10 TABLET ORAL DAILY
Qty: 30 TABLET | Refills: 0 | Status: SHIPPED | OUTPATIENT
Start: 2024-08-27

## 2024-08-27 NOTE — TELEPHONE ENCOUNTER
Medication:   Requested Prescriptions     Pending Prescriptions Disp Refills    VILAZODONE HCL 10 MG Tablet [Pharmacy Med Name: VIIBRYD 10 MG TABLET] 30 tablet 0     Sig: TAKE 1 TABLET BY MOUTH DAILY       Last Filled:  7/1/24    Patient Phone Number: [phone] (home)     Last appt: 7/30/2024   Next appt: 8/27/2024    Last Labs DM: No results found for: \"LABA1C\"  Last Lipid: No results found for: \"CHOL\", \"TRIG\", \"HDL\"  Last PSA: No results found for: \"PSA\"  Last Thyroid:   Lab Results   Component Value Date/Time    TSH 0.26 05/26/2022 01:49 PM    FT3 3.0 04/19/2022 02:35 PM    G3DUIEG 1.19 05/26/2022 01:49 PM    T4FREE 1.2 05/26/2022 01:49 PM

## 2024-09-05 ENCOUNTER — OFFICE VISIT (OUTPATIENT)
Dept: FAMILY MEDICINE CLINIC | Age: 39
End: 2024-09-05
Payer: COMMERCIAL

## 2024-09-05 VITALS
HEART RATE: 72 BPM | BODY MASS INDEX: 26.99 KG/M2 | OXYGEN SATURATION: 97 % | SYSTOLIC BLOOD PRESSURE: 116 MMHG | WEIGHT: 162 LBS | DIASTOLIC BLOOD PRESSURE: 66 MMHG | HEIGHT: 65 IN

## 2024-09-05 DIAGNOSIS — G47.9 DIFFICULTY SLEEPING: ICD-10-CM

## 2024-09-05 DIAGNOSIS — F41.9 ANXIETY: Primary | ICD-10-CM

## 2024-09-05 PROCEDURE — G8427 DOCREV CUR MEDS BY ELIG CLIN: HCPCS | Performed by: NURSE PRACTITIONER

## 2024-09-05 PROCEDURE — 4004F PT TOBACCO SCREEN RCVD TLK: CPT | Performed by: NURSE PRACTITIONER

## 2024-09-05 PROCEDURE — 99213 OFFICE O/P EST LOW 20 MIN: CPT | Performed by: NURSE PRACTITIONER

## 2024-09-05 PROCEDURE — G8419 CALC BMI OUT NRM PARAM NOF/U: HCPCS | Performed by: NURSE PRACTITIONER

## 2024-09-05 RX ORDER — VILAZODONE HYDROCHLORIDE 20 MG/1
20 TABLET ORAL DAILY
Qty: 30 TABLET | Refills: 0 | Status: SHIPPED | OUTPATIENT
Start: 2024-09-05

## 2024-09-05 RX ORDER — HYDROXYZINE PAMOATE 25 MG/1
25 CAPSULE ORAL 3 TIMES DAILY PRN
Qty: 90 CAPSULE | Refills: 0 | Status: SHIPPED | OUTPATIENT
Start: 2024-09-05 | End: 2024-10-05

## 2024-09-05 NOTE — PROGRESS NOTES
Rosalee Avila (:  1985) is a 39 y.o. female,Established patient, here for evaluation of the following chief complaint(s):  Follow-up         Assessment & Plan  Anxiety    Not progressing;  Increase Viibryd.  Continue vistaril PRN.    Orders:    hydrOXYzine pamoate (VISTARIL) 25 MG capsule; Take 1 capsule by mouth 3 times daily as needed for Anxiety    vilazodone HCl (VIIBRYD) 20 MG TABS; Take 1 tablet by mouth daily    Difficulty sleeping    Not progressing;  Discussed sleep hygiene and journaling before bed.  Encouraged patient to take 3-5 mg melatonin at dinner time.           Return in about 4 weeks (around 10/3/2024).       Subjective   HPI  Adjustment disorder- anxiety  Yesterday had a weird anxiety attack- couldn't let it go; has been the only time she has had since starting the Viibryd  Boyfriend noticed she is a little calmer  Viibryd    Difficulty sleeping  Feels like she is awake in her dreams the entire time  Can feel like a nightmare  Gets up early in the AM for work    Review of Systems       Objective   Physical Exam  Vitals reviewed.   Constitutional:       Appearance: Normal appearance.   HENT:      Head: Normocephalic.      Right Ear: External ear normal.      Left Ear: External ear normal.      Nose: Nose normal.   Pulmonary:      Effort: No respiratory distress.   Neurological:      Mental Status: She is alert.   Psychiatric:         Mood and Affect: Mood normal.       An electronic signature was used to authenticate this note.    --Krystal Paris, APRN - CNP

## 2024-09-18 DIAGNOSIS — F41.9 ANXIETY: ICD-10-CM

## 2024-09-18 RX ORDER — VILAZODONE HYDROCHLORIDE 10 MG/1
10 TABLET ORAL DAILY
Qty: 30 TABLET | Refills: 0 | OUTPATIENT
Start: 2024-09-18

## 2024-09-18 NOTE — TELEPHONE ENCOUNTER
Medication:   Requested Prescriptions     Pending Prescriptions Disp Refills    VILAZODONE HCL 10 MG Tablet [Pharmacy Med Name: VIIBRYD 10 MG TABLET] 30 tablet 0     Sig: TAKE 1 TABLET BY MOUTH DAILY       Last Filled:  9/5/24    Patient Phone Number: [phone] (home)     Last appt: 9/5/2024   Next appt: 9/30/2024    Last Labs DM: No results found for: \"LABA1C\"  Last Lipid: No results found for: \"CHOL\", \"TRIG\", \"HDL\"  Last PSA: No results found for: \"PSA\"  Last Thyroid:   Lab Results   Component Value Date/Time    TSH 0.26 05/26/2022 01:49 PM    FT3 3.0 04/19/2022 02:35 PM    J2YCPGK 1.19 05/26/2022 01:49 PM    T4FREE 1.2 05/26/2022 01:49 PM

## 2024-10-19 DIAGNOSIS — F41.9 ANXIETY: ICD-10-CM

## 2024-10-21 ENCOUNTER — TELEPHONE (OUTPATIENT)
Dept: FAMILY MEDICINE CLINIC | Age: 39
End: 2024-10-21

## 2024-10-21 RX ORDER — VILAZODONE HYDROCHLORIDE 20 MG/1
20 TABLET ORAL DAILY
Qty: 30 TABLET | Refills: 0 | Status: SHIPPED | OUTPATIENT
Start: 2024-10-21

## 2024-10-21 NOTE — TELEPHONE ENCOUNTER
Medication:   Requested Prescriptions     Pending Prescriptions Disp Refills    vilazodone HCl (VIIBRYD) 20 MG TABS [Pharmacy Med Name: VILAZODONE 20MG TABLETS] 30 tablet 0     Sig: TAKE 1 TABLET BY MOUTH DAILY        Last Filled:  9/5/24    Patient Phone Number: [phone] (home)     Last appt: 9/5/2024   Next appt: Visit date not found    Last OARRS:        No data to display

## 2024-10-21 NOTE — TELEPHONE ENCOUNTER
Vilazodone HCI 20 mg with refills needed.  Send to [facility] on [address] in [place]. Pt is completely out and will be using a Good Rx Coupon.

## 2024-11-25 DIAGNOSIS — F41.9 ANXIETY: ICD-10-CM

## 2024-11-25 RX ORDER — VILAZODONE HYDROCHLORIDE 20 MG/1
20 TABLET ORAL DAILY
Qty: 30 TABLET | Refills: 0 | Status: SHIPPED | OUTPATIENT
Start: 2024-11-25

## 2024-11-25 NOTE — TELEPHONE ENCOUNTER
Medication:   Requested Prescriptions     Pending Prescriptions Disp Refills    vilazodone HCl (VIIBRYD) 20 MG TABS 30 tablet 0     Sig: Take 1 tablet by mouth daily     Last Filled:  # 30 ON 10/21/24    Last appt: 9/5/2024   Next appt: 11/26/2024    Last OARRS:        No data to display

## 2024-11-26 ENCOUNTER — TELEMEDICINE (OUTPATIENT)
Dept: FAMILY MEDICINE CLINIC | Age: 39
End: 2024-11-26

## 2024-11-26 DIAGNOSIS — F41.9 ANXIETY: Primary | ICD-10-CM

## 2024-11-26 DIAGNOSIS — G47.9 DIFFICULTY SLEEPING: ICD-10-CM

## 2024-11-26 PROCEDURE — 99213 OFFICE O/P EST LOW 20 MIN: CPT | Performed by: NURSE PRACTITIONER

## 2024-11-26 RX ORDER — TRAZODONE HYDROCHLORIDE 50 MG/1
25-50 TABLET, FILM COATED ORAL NIGHTLY PRN
Qty: 30 TABLET | Refills: 2 | Status: SHIPPED | OUTPATIENT
Start: 2024-11-26

## 2024-11-26 SDOH — ECONOMIC STABILITY: INCOME INSECURITY: HOW HARD IS IT FOR YOU TO PAY FOR THE VERY BASICS LIKE FOOD, HOUSING, MEDICAL CARE, AND HEATING?: SOMEWHAT HARD

## 2024-11-26 SDOH — ECONOMIC STABILITY: FOOD INSECURITY: WITHIN THE PAST 12 MONTHS, THE FOOD YOU BOUGHT JUST DIDN'T LAST AND YOU DIDN'T HAVE MONEY TO GET MORE.: NEVER TRUE

## 2024-11-26 SDOH — ECONOMIC STABILITY: FOOD INSECURITY: WITHIN THE PAST 12 MONTHS, YOU WORRIED THAT YOUR FOOD WOULD RUN OUT BEFORE YOU GOT MONEY TO BUY MORE.: NEVER TRUE

## 2024-11-26 SDOH — ECONOMIC STABILITY: TRANSPORTATION INSECURITY
IN THE PAST 12 MONTHS, HAS LACK OF TRANSPORTATION KEPT YOU FROM MEETINGS, WORK, OR FROM GETTING THINGS NEEDED FOR DAILY LIVING?: NO

## 2024-11-26 NOTE — PROGRESS NOTES
Rosalee Avila, was evaluated through a synchronous (real-time) audio-video encounter. The patient (or guardian if applicable) is aware that this is a billable service, which includes applicable co-pays. This Virtual Visit was conducted with patient's (and/or legal guardian's) consent. Patient identification was verified, and a caregiver was present when appropriate.   The patient was located at Home: [address] [place], [place] [id number]  Provider was located at Facility (Appt Dept State): [facility]  Confirm you are appropriately licensed, registered, or certified to deliver care in the state where the patient is located as indicated above. If you are not or unsure, please re-schedule the visit: Yes, I confirm.     Rosalee Avila (:  1985) is a Established patient, presenting virtually for evaluation of the following:      Below is the assessment and plan developed based on review of pertinent history, physical exam, labs, studies, and medications.     Assessment & Plan  Anxiety    Stable;  Patient doing well on Viibyrd.  Patient would like to consider increasing, but will wait since she is doing so well.         Difficulty sleeping    Not progressing;  Begin trazodone.  Risks and benefits discussed.    Orders:    traZODone (DESYREL) 50 MG tablet; Take 0.5-1 tablets by mouth nightly as needed for Sleep      Return in about 2 months (around 2025), or if symptoms worsen or fail to improve.       Subjective   HPI  Mood has been way better  Feels really balanced  Less mood swings  Less anxiety attacks  Has been life changing  Still has up and down days- more manageable  Frustration- doesn't take her down to rage or madness  Boyfriend is happier- spending more time together    Not sleeping as well  Tossing and turning all night- is not restful  Gets up early 4:30 to get to work  Thinks she snores  Has not woken up coughing or choking  Melatonin- not really

## 2024-12-28 DIAGNOSIS — G47.9 DIFFICULTY SLEEPING: ICD-10-CM

## 2024-12-28 DIAGNOSIS — F41.9 ANXIETY: ICD-10-CM

## 2024-12-30 RX ORDER — TRAZODONE HYDROCHLORIDE 50 MG/1
25-50 TABLET, FILM COATED ORAL NIGHTLY PRN
Qty: 30 TABLET | Refills: 2 | Status: SHIPPED | OUTPATIENT
Start: 2024-12-30

## 2024-12-30 RX ORDER — VILAZODONE HYDROCHLORIDE 20 MG/1
20 TABLET ORAL DAILY
Qty: 30 TABLET | Refills: 0 | Status: SHIPPED | OUTPATIENT
Start: 2024-12-30

## 2024-12-30 NOTE — TELEPHONE ENCOUNTER
Medication:   Requested Prescriptions     Pending Prescriptions Disp Refills    vilazodone HCl (VIIBRYD) 20 MG TABS 30 tablet 0     Sig: Take 1 tablet by mouth daily    traZODone (DESYREL) 50 MG tablet 30 tablet 2     Sig: Take 0.5-1 tablets by mouth nightly as needed for Sleep        Last Filled:      Patient Phone Number: [phone] (home)     Last appt: 11/26/2024   Next appt: Visit date not found    Last OARRS:        No data to display

## 2025-02-10 DIAGNOSIS — F41.9 ANXIETY: ICD-10-CM

## 2025-02-10 DIAGNOSIS — G47.9 DIFFICULTY SLEEPING: ICD-10-CM

## 2025-02-11 NOTE — TELEPHONE ENCOUNTER
Medication:   Requested Prescriptions     Pending Prescriptions Disp Refills    vilazodone HCl (VIIBRYD) 20 MG TABS 30 tablet 0     Sig: Take 1 tablet by mouth daily    traZODone (DESYREL) 50 MG tablet 30 tablet 2     Sig: Take 0.5-1 tablets by mouth nightly as needed for Sleep        Last Filled:  12/30/24    Patient Phone Number: [phone] (home)     Last appt: 11/26/2024   Next appt: Visit date not found    Last OARRS:        No data to display                Sent message to schedule and scheduling link

## 2025-02-12 RX ORDER — TRAZODONE HYDROCHLORIDE 50 MG/1
25-50 TABLET ORAL NIGHTLY PRN
Qty: 30 TABLET | Refills: 0 | Status: SHIPPED | OUTPATIENT
Start: 2025-02-12 | End: 2025-03-21 | Stop reason: SDUPTHER

## 2025-02-12 RX ORDER — VILAZODONE HYDROCHLORIDE 20 MG/1
20 TABLET ORAL DAILY
Qty: 30 TABLET | Refills: 0 | Status: SHIPPED | OUTPATIENT
Start: 2025-02-12 | End: 2025-03-24

## 2025-03-21 DIAGNOSIS — G47.9 DIFFICULTY SLEEPING: ICD-10-CM

## 2025-03-21 DIAGNOSIS — F41.9 ANXIETY: ICD-10-CM

## 2025-03-24 DIAGNOSIS — F41.9 ANXIETY: ICD-10-CM

## 2025-03-24 RX ORDER — VILAZODONE HYDROCHLORIDE 20 MG/1
20 TABLET ORAL DAILY
Qty: 30 TABLET | Refills: 0 | Status: SHIPPED | OUTPATIENT
Start: 2025-03-24

## 2025-03-24 RX ORDER — TRAZODONE HYDROCHLORIDE 50 MG/1
25-50 TABLET ORAL NIGHTLY PRN
Qty: 30 TABLET | Refills: 0 | Status: SHIPPED | OUTPATIENT
Start: 2025-03-24

## 2025-03-24 NOTE — TELEPHONE ENCOUNTER
Medication:   Requested Prescriptions     Pending Prescriptions Disp Refills    vilazodone HCl (VIIBRYD) 20 MG TABS [Pharmacy Med Name: Vilazodone HCl 20 MG Oral Tablet] 30 tablet 0     Sig: Take 1 tablet by mouth once daily        Last Filled:  2/12/25    Pended to: [facility] [address] [place] [address] -  [phone] -  [phone]     Patient Phone Number: [phone] (home)     Last appt: 11/26/2024   Next appt: 3/31/2025    Last OARRS:        No data to display

## 2025-03-24 NOTE — TELEPHONE ENCOUNTER
Medication:   Requested Prescriptions     Pending Prescriptions Disp Refills    vilazodone HCl (VIIBRYD) 20 MG TABS 30 tablet 0     Sig: Take 1 tablet by mouth daily    traZODone (DESYREL) 50 MG tablet 30 tablet 0     Sig: Take 0.5-1 tablets by mouth nightly as needed for Sleep        Last Filled: 2/11/25    Patient Phone Number: [phone] (home)     Last appt: 11/26/2024   Next appt: Visit date not found    Last OARRS:        No data to display                Pt needs appointment    Left voice mail and sent link via my chart

## 2025-03-31 ENCOUNTER — OFFICE VISIT (OUTPATIENT)
Dept: FAMILY MEDICINE CLINIC | Age: 40
End: 2025-03-31
Payer: COMMERCIAL

## 2025-03-31 VITALS
BODY MASS INDEX: 28.66 KG/M2 | WEIGHT: 172 LBS | HEART RATE: 76 BPM | OXYGEN SATURATION: 96 % | HEIGHT: 65 IN | SYSTOLIC BLOOD PRESSURE: 130 MMHG | DIASTOLIC BLOOD PRESSURE: 82 MMHG

## 2025-03-31 DIAGNOSIS — F41.9 ANXIETY: Primary | ICD-10-CM

## 2025-03-31 DIAGNOSIS — K21.9 GASTROESOPHAGEAL REFLUX DISEASE, UNSPECIFIED WHETHER ESOPHAGITIS PRESENT: ICD-10-CM

## 2025-03-31 DIAGNOSIS — Z23 NEED FOR TDAP VACCINATION: ICD-10-CM

## 2025-03-31 PROCEDURE — 90471 IMMUNIZATION ADMIN: CPT | Performed by: NURSE PRACTITIONER

## 2025-03-31 PROCEDURE — 99213 OFFICE O/P EST LOW 20 MIN: CPT | Performed by: NURSE PRACTITIONER

## 2025-03-31 PROCEDURE — 90715 TDAP VACCINE 7 YRS/> IM: CPT | Performed by: NURSE PRACTITIONER

## 2025-03-31 RX ORDER — PANTOPRAZOLE SODIUM 20 MG/1
20 TABLET, DELAYED RELEASE ORAL
Qty: 90 TABLET | Refills: 1 | Status: SHIPPED | OUTPATIENT
Start: 2025-03-31

## 2025-03-31 SDOH — ECONOMIC STABILITY: FOOD INSECURITY: WITHIN THE PAST 12 MONTHS, YOU WORRIED THAT YOUR FOOD WOULD RUN OUT BEFORE YOU GOT MONEY TO BUY MORE.: NEVER TRUE

## 2025-03-31 SDOH — ECONOMIC STABILITY: FOOD INSECURITY: WITHIN THE PAST 12 MONTHS, THE FOOD YOU BOUGHT JUST DIDN'T LAST AND YOU DIDN'T HAVE MONEY TO GET MORE.: NEVER TRUE

## 2025-03-31 ASSESSMENT — PATIENT HEALTH QUESTIONNAIRE - PHQ9
6. FEELING BAD ABOUT YOURSELF - OR THAT YOU ARE A FAILURE OR HAVE LET YOURSELF OR YOUR FAMILY DOWN: NOT AT ALL
SUM OF ALL RESPONSES TO PHQ QUESTIONS 1-9: 2
10. IF YOU CHECKED OFF ANY PROBLEMS, HOW DIFFICULT HAVE THESE PROBLEMS MADE IT FOR YOU TO DO YOUR WORK, TAKE CARE OF THINGS AT HOME, OR GET ALONG WITH OTHER PEOPLE: NOT DIFFICULT AT ALL
SUM OF ALL RESPONSES TO PHQ QUESTIONS 1-9: 2
9. THOUGHTS THAT YOU WOULD BE BETTER OFF DEAD, OR OF HURTING YOURSELF: NOT AT ALL
SUM OF ALL RESPONSES TO PHQ QUESTIONS 1-9: 2
7. TROUBLE CONCENTRATING ON THINGS, SUCH AS READING THE NEWSPAPER OR WATCHING TELEVISION: NOT AT ALL
2. FEELING DOWN, DEPRESSED OR HOPELESS: NOT AT ALL
8. MOVING OR SPEAKING SO SLOWLY THAT OTHER PEOPLE COULD HAVE NOTICED. OR THE OPPOSITE, BEING SO FIGETY OR RESTLESS THAT YOU HAVE BEEN MOVING AROUND A LOT MORE THAN USUAL: NOT AT ALL
5. POOR APPETITE OR OVEREATING: SEVERAL DAYS
3. TROUBLE FALLING OR STAYING ASLEEP: NOT AT ALL
4. FEELING TIRED OR HAVING LITTLE ENERGY: SEVERAL DAYS
SUM OF ALL RESPONSES TO PHQ QUESTIONS 1-9: 2
1. LITTLE INTEREST OR PLEASURE IN DOING THINGS: NOT AT ALL

## 2025-03-31 NOTE — PROGRESS NOTES
Rosalee Avila (:  1985) is a 39 y.o. female,Established patient, here for evaluation of the following chief complaint(s):  6 Month Follow-Up       Assessment & Plan  Anxiety    Stable;  Patient doing well on Viibryd and trazodone.  Continue current regimen.         Gastroesophageal reflux disease, unspecified whether esophagitis present    Not progressing;  Restart protonix.  Discussed if symptoms persisting will re-refer to ENT for esophageal dilation.     Orders:    pantoprazole (PROTONIX) 20 MG tablet; Take 1 tablet by mouth every morning (before breakfast)    Need for Tdap vaccination    Stable; Immunization counseling for Tdap.           Return in about 6 months (around 2025).       Subjective   HPI  Anxiety  Mood has been good  There was been stuff happening- doesn't go crazy or cry- no numbness  Just switched back from night shift  Sleep is good  Family is good  On Viibryd- does not want to increase until she has to  Trazodone    EOE  Hx of needing esophagus stretching  Hx of taking protonix    Review of Systems       Objective   Physical Exam  Vitals reviewed.   Constitutional:       Appearance: Normal appearance.   HENT:      Head: Normocephalic.      Right Ear: External ear normal.      Left Ear: External ear normal.      Nose: Nose normal.   Pulmonary:      Effort: No respiratory distress.   Neurological:      Mental Status: She is alert.   Psychiatric:         Mood and Affect: Mood normal.       An electronic signature was used to authenticate this note.    --Krystal Paris, APRN - CNP

## 2025-04-08 ENCOUNTER — HOSPITAL ENCOUNTER (OUTPATIENT)
Age: 40
Discharge: HOME | End: 2025-04-08
Payer: COMMERCIAL

## 2025-04-08 DIAGNOSIS — F41.9 ANXIETY: ICD-10-CM

## 2025-04-08 LAB
25(OH)D3 SERPL-MCNC: 35.3 NG/ML
ALBUMIN SERPL-MCNC: 4.4 G/DL (ref 3.4–5)
ALBUMIN/GLOB SERPL: 1.3 {RATIO} (ref 1.1–2.2)
ALP SERPL-CCNC: 95 U/L (ref 40–129)
ALT SERPL-CCNC: 20 U/L (ref 10–40)
ANION GAP SERPL CALCULATED.3IONS-SCNC: 12 MMOL/L (ref 3–16)
AST SERPL-CCNC: 24 U/L (ref 15–37)
BILIRUB SERPL-MCNC: 0.7 MG/DL (ref 0–1)
BUN SERPL-MCNC: 16 MG/DL (ref 7–20)
CALCIUM SERPL-MCNC: 9.6 MG/DL (ref 8.3–10.6)
CHLORIDE SERPL-SCNC: 102 MMOL/L (ref 99–110)
CHOLEST SERPL-MCNC: 208 MG/DL (ref 0–199)
CO2 SERPL-SCNC: 25 MMOL/L (ref 21–32)
CREAT SERPL-MCNC: 0.9 MG/DL (ref 0.6–1.1)
DEPRECATED RDW RBC AUTO: 14.1 % (ref 12.4–15.4)
GFR SERPLBLD CREATININE-BSD FMLA CKD-EPI: 84 ML/MIN/{1.73_M2}
GLUCOSE SERPL-MCNC: 89 MG/DL (ref 70–99)
HCT VFR BLD AUTO: 43.2 % (ref 36–48)
HDLC SERPL-MCNC: 81 MG/DL (ref 40–60)
HGB BLD-MCNC: 14.6 G/DL (ref 12–16)
LDLC SERPL CALC-MCNC: 110 MG/DL
MCH RBC QN AUTO: 28.5 PG (ref 26–34)
MCHC RBC AUTO-ENTMCNC: 33.8 G/DL (ref 31–36)
MCV RBC AUTO: 84.4 FL (ref 80–100)
PLATELET # BLD AUTO: 310 K/UL (ref 135–450)
PMV BLD AUTO: 9.4 FL (ref 5–10.5)
POTASSIUM SERPL-SCNC: 4.5 MMOL/L (ref 3.5–5.1)
PROT SERPL-MCNC: 7.9 G/DL (ref 6.4–8.2)
RBC # BLD AUTO: 5.12 M/UL (ref 4–5.2)
SODIUM SERPL-SCNC: 139 MMOL/L (ref 136–145)
T4 FREE SERPL-MCNC: 0.9 NG/DL (ref 0.9–1.8)
TRIGL SERPL-MCNC: 85 MG/DL (ref 0–150)
TSH SERPL DL<=0.005 MIU/L-ACNC: 2.55 UIU/ML (ref 0.27–4.2)
VIT B12 SERPL-MCNC: 442 PG/ML (ref 211–911)
VLDLC SERPL CALC-MCNC: 17 MG/DL
WBC # BLD AUTO: 5.8 K/UL (ref 4–11)

## 2025-04-08 PROCEDURE — 84443 ASSAY THYROID STIM HORMONE: CPT

## 2025-04-08 PROCEDURE — 80061 LIPID PANEL: CPT

## 2025-04-08 PROCEDURE — 85027 COMPLETE CBC AUTOMATED: CPT

## 2025-04-08 PROCEDURE — 36415 COLL VENOUS BLD VENIPUNCTURE: CPT

## 2025-04-08 PROCEDURE — 82607 VITAMIN B-12: CPT

## 2025-04-08 PROCEDURE — 80053 COMPREHEN METABOLIC PANEL: CPT

## 2025-04-08 PROCEDURE — 84439 ASSAY OF FREE THYROXINE: CPT

## 2025-04-08 PROCEDURE — 82306 VITAMIN D 25 HYDROXY: CPT

## 2025-04-13 ENCOUNTER — RESULTS FOLLOW-UP (OUTPATIENT)
Dept: FAMILY MEDICINE CLINIC | Age: 40
End: 2025-04-13

## 2025-04-21 DIAGNOSIS — G47.9 DIFFICULTY SLEEPING: ICD-10-CM

## 2025-04-21 DIAGNOSIS — F41.9 ANXIETY: ICD-10-CM

## 2025-04-22 RX ORDER — VILAZODONE HYDROCHLORIDE 20 MG/1
20 TABLET ORAL DAILY
Qty: 30 TABLET | Refills: 0 | Status: SHIPPED | OUTPATIENT
Start: 2025-04-22

## 2025-04-22 RX ORDER — TRAZODONE HYDROCHLORIDE 50 MG/1
25-50 TABLET ORAL NIGHTLY PRN
Qty: 30 TABLET | Refills: 0 | Status: SHIPPED | OUTPATIENT
Start: 2025-04-22

## 2025-04-22 NOTE — TELEPHONE ENCOUNTER
Medication:   Requested Prescriptions     Pending Prescriptions Disp Refills    vilazodone HCl (VIIBRYD) 20 MG TABS 30 tablet 0     Sig: Take 1 tablet by mouth daily    traZODone (DESYREL) 50 MG tablet 30 tablet 0     Sig: Take 0.5-1 tablets by mouth nightly as needed for Sleep        Last Filled:  3/24/25    Patient Phone Number: [phone] (home)     Last appt: 3/31/2025   Next appt: Visit date not found    Last OARRS:        No data to display

## 2025-05-27 DIAGNOSIS — F41.9 ANXIETY: ICD-10-CM

## 2025-05-27 DIAGNOSIS — G47.9 DIFFICULTY SLEEPING: ICD-10-CM

## 2025-05-27 RX ORDER — TRAZODONE HYDROCHLORIDE 50 MG/1
25-50 TABLET ORAL NIGHTLY PRN
Qty: 30 TABLET | Refills: 0 | Status: SHIPPED | OUTPATIENT
Start: 2025-05-27

## 2025-05-27 RX ORDER — VILAZODONE HYDROCHLORIDE 20 MG/1
20 TABLET ORAL DAILY
Qty: 30 TABLET | Refills: 0 | Status: SHIPPED | OUTPATIENT
Start: 2025-05-27

## 2025-05-27 NOTE — TELEPHONE ENCOUNTER
Medication:   Requested Prescriptions     Pending Prescriptions Disp Refills    vilazodone HCl (VIIBRYD) 20 MG TABS 30 tablet 0     Sig: Take 1 tablet by mouth daily    traZODone (DESYREL) 50 MG tablet 30 tablet 0     Sig: Take 0.5-1 tablets by mouth nightly as needed for Sleep       Last Filled:  4/22/25    Patient Phone Number: [phone] (home)     Last appt: 3/31/2025   Next appt: Visit date not found    Last Labs DM: No results found for: \"LABA1C\"  Last Lipid:   Lab Results   Component Value Date/Time    CHOL 208 04/08/2025 07:35 AM    TRIG 85 04/08/2025 07:35 AM    HDL 81 04/08/2025 07:35 AM     Last PSA: No results found for: \"PSA\"  Last Thyroid:   Lab Results   Component Value Date/Time    TSH 2.55 04/08/2025 07:35 AM    FT3 3.0 04/19/2022 02:35 PM    F6BXMHT 1.19 05/26/2022 01:49 PM    T4FREE 0.9 04/08/2025 07:35 AM

## 2025-07-02 DIAGNOSIS — F41.9 ANXIETY: ICD-10-CM

## 2025-07-02 DIAGNOSIS — G47.9 DIFFICULTY SLEEPING: ICD-10-CM

## 2025-07-03 RX ORDER — VILAZODONE HYDROCHLORIDE 20 MG/1
20 TABLET ORAL DAILY
Qty: 30 TABLET | Refills: 0 | Status: SHIPPED | OUTPATIENT
Start: 2025-07-03

## 2025-07-03 RX ORDER — TRAZODONE HYDROCHLORIDE 50 MG/1
25-50 TABLET ORAL NIGHTLY PRN
Qty: 30 TABLET | Refills: 0 | Status: SHIPPED | OUTPATIENT
Start: 2025-07-03

## 2025-07-03 NOTE — TELEPHONE ENCOUNTER
Recent Visits  Date Type Provider Dept   03/31/25 Office Visit Krystal Paris APRN - CNP [facility]   09/05/24 Office Visit Krystal Paris APRN - CNP [facility]   07/30/24 Office Visit Krystal Paris APRN - CNP [facility]   07/01/24 Office Visit Krystal Paris APRN - CNP [facility]   Showing recent visits within past 540 days with a meds authorizing provider and meeting all other requirements  Future Appointments  No visits were found meeting these conditions.  Showing future appointments within next 150 days with a meds authorizing provider and meeting all other requirements

## 2025-08-11 DIAGNOSIS — F41.9 ANXIETY: ICD-10-CM

## 2025-08-11 DIAGNOSIS — G47.9 DIFFICULTY SLEEPING: ICD-10-CM

## 2025-08-12 RX ORDER — TRAZODONE HYDROCHLORIDE 50 MG/1
25-50 TABLET ORAL NIGHTLY PRN
Qty: 30 TABLET | Refills: 0 | Status: SHIPPED | OUTPATIENT
Start: 2025-08-12

## 2025-08-12 RX ORDER — VILAZODONE HYDROCHLORIDE 20 MG/1
20 TABLET ORAL DAILY
Qty: 30 TABLET | Refills: 0 | Status: SHIPPED | OUTPATIENT
Start: 2025-08-12

## (undated) DEVICE — GOWN,PREVENTION PLUS,XLN/XL,ST,24/CS: Brand: MEDLINE

## (undated) DEVICE — DEVON TUBE HOLDER REMOVABLE TOUCH FASTEN STRAP: Brand: DEVON

## (undated) DEVICE — SUTURE TICRON SZ 2-0 L30IN NONABSORBABLE BLU SC-250 CS-10 8886294753

## (undated) DEVICE — SUTURE VCRL SZ 0 L36IN ABSRB UD CT-1 L36MM 1/2 CIR TAPR PNT VCP946H

## (undated) DEVICE — ZIMMER® STERILE DISPOSABLE TOURNIQUET CUFF WITH PLC, DUAL PORT, SINGLE BLADDER, 30 IN. (76 CM)

## (undated) DEVICE — WEREWOLF FLOW 90 COBLATION WAND: Brand: COBLATION

## (undated) DEVICE — PENCIL ES L3M BTTN SWCH S STL HEX LOK BLDE ELECTRD HOLSTER

## (undated) DEVICE — PAD ABSRB W8XL10IN ABD HYDROPHOBIC NONWOVEN THCK LAYR CELOS

## (undated) DEVICE — INTENDED FOR TISSUE SEPARATION, AND OTHER PROCEDURES THAT REQUIRE A SHARP SURGICAL BLADE TO PUNCTURE OR CUT.: Brand: BARD-PARKER ® STAINLESS STEEL BLADES

## (undated) DEVICE — SUTURE VCRL + SZ 2-0 L36IN ABSRB UD L36MM CT-1 1/2 CIR VCP945H

## (undated) DEVICE — COVER,MAYO STAND,STERILE: Brand: MEDLINE

## (undated) DEVICE — PADDING CAST W6INXL4YD NONSTERILE COT RAYON MICROPLEATED

## (undated) DEVICE — DRESSING,GAUZE,XEROFORM,CURAD,1"X8",ST: Brand: CURAD

## (undated) DEVICE — NOVOSTITCH CARTRIDGE 2-0: Brand: NOVOSTITCH

## (undated) DEVICE — DRAPE,U/ SHT,SPLIT,PLAS,STERIL: Brand: MEDLINE

## (undated) DEVICE — HYPODERMIC SAFETY NEEDLE: Brand: MAGELLAN

## (undated) DEVICE — NOVOCUT SUTURE MANAGER: Brand: NOVOCUT

## (undated) DEVICE — NOVOSTITCH PRO MEN RPR SYS 2-0: Brand: NOVOSTITCH

## (undated) DEVICE — SHEET,DRAPE,53X77,STERILE: Brand: MEDLINE

## (undated) DEVICE — SPONGE LAP W18XL18IN WHT COT 4 PLY FLD STRUNG RADPQ DISP ST

## (undated) DEVICE — SOLUTION IRRIG 3000ML 0.9% SOD CHL USP UROMATIC PLAS CONT

## (undated) DEVICE — CONTAINER,SPECIMEN,OR STERILE,4OZ: Brand: MEDLINE

## (undated) DEVICE — BNDG,ELSTC,MATRIX,STRL,6"X5YD,LF,HOOK&LP: Brand: MEDLINE

## (undated) DEVICE — GOWN SIRUS NONREIN XL W/TWL: Brand: MEDLINE INDUSTRIES, INC.

## (undated) DEVICE — GLOVE SURG SZ 75 L12IN FNGR THK79MIL GRN LTX FREE

## (undated) DEVICE — MASTISOL ADHESIVE LIQ 2/3ML

## (undated) DEVICE — SUTURE MCRYL + SZ 4-0 L27IN ABSRB UD L19MM PS-2 3/8 CIR MCP426H

## (undated) DEVICE — APPLICATOR MEDICATED 26 CC SOLUTION HI LT ORNG CHLORAPREP

## (undated) DEVICE — TOWEL,OR,DSP,ST,BLUE,STD,4/PK,20PK/CS: Brand: MEDLINE

## (undated) DEVICE — DECANTER FLD 9IN ST BG FOR ASEP TRNSF OF FLD

## (undated) DEVICE — SUTURE VCRL + 1 L27IN ABSRB UD CT-1 L36MM 1/2 CIR TAPR PNT VCP261H

## (undated) DEVICE — SUTURE ETHLN SZ 3-0 L18IN NONABSORBABLE BLK PS-2 L19MM 3/8 1669H

## (undated) DEVICE — GLOVE ORANGE PI 7 1/2   MSG9075

## (undated) DEVICE — GLOVE,SURG,SENSICARE SLT,LF,PF,7.5: Brand: MEDLINE

## (undated) DEVICE — 3M™ STERI-DRAPE™ ARTHROSCOPY SHEET WITH POUCH 1194: Brand: STERI-DRAPE™

## (undated) DEVICE — 4.5 MM FULL RADIUS ELITE STRAIGHT                                    DISPOSABLE BLADES, MAROON,PACKAGED 6                                    PER BOX, STERILE

## (undated) DEVICE — STRIP,CLOSURE,WOUND,MEDI-STRIP,1/2X4: Brand: MEDLINE

## (undated) DEVICE — BRACE ORTH HINGED XL 26 IN LT RT LEG OPERATIVE BLK TROM

## (undated) DEVICE — Device

## (undated) DEVICE — FAST FIX FLX CRVD INSRTR BNDR CANN SET: Brand: FAST-FIX

## (undated) DEVICE — DYONICS 25 PATIENT TUBE SET MUST                                    BE USED WITH 7211007, 12 PER BOX

## (undated) DEVICE — STERILE SURGICAL BLADE SIZE 15: Brand: CARDINAL HEALTH

## (undated) DEVICE — COVER,TABLE,77X90,STERILE: Brand: MEDLINE

## (undated) DEVICE — DYONICS 5.5 MM BONECUTTER PLATINUM                                    BLADE, ORANGE, 10000 MAXIMUM RPM,                                    PACKAGED 6 PER BOX, STERILE

## (undated) DEVICE — MASC TURNOVER KIT: Brand: MEDLINE INDUSTRIES, INC.

## (undated) DEVICE — LIGHT HANDLE: Brand: DEVON